# Patient Record
Sex: FEMALE | Race: WHITE | NOT HISPANIC OR LATINO | Employment: OTHER | URBAN - METROPOLITAN AREA
[De-identification: names, ages, dates, MRNs, and addresses within clinical notes are randomized per-mention and may not be internally consistent; named-entity substitution may affect disease eponyms.]

---

## 2017-06-13 LAB
HEP C HIM: NORMAL
HIV 1&2 EXT: NORMAL

## 2017-07-12 LAB — EJECTION FRACTION: 60 %

## 2019-01-10 ENCOUNTER — TRANSFERRED RECORDS (OUTPATIENT)
Dept: HEALTH INFORMATION MANAGEMENT | Facility: CLINIC | Age: 59
End: 2019-01-10

## 2019-01-10 LAB
ALBUMIN (URINE) MG/SPEC: 12.8 MG/L
ALBUMIN/CREATININE RATIO: 12.3
CHOLEST SERPL-MCNC: 183 MG/DL (ref 0–199)
CREATININE (URINE): 104 MG/DL
HDLC SERPL-MCNC: 30 MG/DL
LDLC SERPL CALC-MCNC: 110 MG/DL (ref 19–130)
LDLC SERPL CALC-MCNC: 110 MG/DL (ref 19–130)
NONHDLC SERPL-MCNC: 153 MG/DL (ref 0–159)
TRIGL SERPL-MCNC: 217 MG/DL (ref 4–149)

## 2019-02-20 LAB — GLUCOSE SERPL-MCNC: 110 MG/DL (ref 70–100)

## 2019-03-15 LAB — RETINOPATHY: NORMAL

## 2019-06-25 ENCOUNTER — TRANSFERRED RECORDS (OUTPATIENT)
Dept: MULTI SPECIALTY CLINIC | Facility: CLINIC | Age: 59
End: 2019-06-25

## 2019-06-25 ENCOUNTER — TRANSFERRED RECORDS (OUTPATIENT)
Dept: HEALTH INFORMATION MANAGEMENT | Facility: CLINIC | Age: 59
End: 2019-06-25

## 2019-06-25 LAB
CREAT SERPL-MCNC: 0.38 MG/DL (ref 0.55–1.02)
GFR SERPL CREATININE-BSD FRML MDRD: >60 ML/MIN/1.73M2
HBA1C MFR BLD: 6.8 % (ref 0–5.7)
HBA1C MFR BLD: 6.8 % (ref 0–?)
POTASSIUM SERPL-SCNC: 4.1 MMOL/L (ref 3.5–5.1)

## 2019-11-15 ENCOUNTER — OFFICE VISIT (OUTPATIENT)
Dept: FAMILY MEDICINE | Facility: CLINIC | Age: 59
End: 2019-11-15
Payer: MEDICARE

## 2019-11-15 VITALS
OXYGEN SATURATION: 91 % | DIASTOLIC BLOOD PRESSURE: 88 MMHG | BODY MASS INDEX: 39.3 KG/M2 | HEIGHT: 64 IN | RESPIRATION RATE: 24 BRPM | HEART RATE: 79 BPM | WEIGHT: 230.2 LBS | SYSTOLIC BLOOD PRESSURE: 160 MMHG | TEMPERATURE: 98.5 F

## 2019-11-15 DIAGNOSIS — F32.0 MILD MAJOR DEPRESSION (H): ICD-10-CM

## 2019-11-15 DIAGNOSIS — I10 BENIGN ESSENTIAL HYPERTENSION WITH TARGET BLOOD PRESSURE BELOW 140/90: ICD-10-CM

## 2019-11-15 DIAGNOSIS — E78.5 HYPERLIPIDEMIA LDL GOAL <100: ICD-10-CM

## 2019-11-15 DIAGNOSIS — F41.1 GAD (GENERALIZED ANXIETY DISORDER): ICD-10-CM

## 2019-11-15 DIAGNOSIS — E11.9 TYPE 2 DIABETES MELLITUS WITHOUT COMPLICATION, WITHOUT LONG-TERM CURRENT USE OF INSULIN (H): Primary | ICD-10-CM

## 2019-11-15 PROCEDURE — 99207 C FOOT EXAM  NO CHARGE: CPT | Mod: 25 | Performed by: PHYSICIAN ASSISTANT

## 2019-11-15 PROCEDURE — 99203 OFFICE O/P NEW LOW 30 MIN: CPT | Performed by: PHYSICIAN ASSISTANT

## 2019-11-15 RX ORDER — METFORMIN HCL 500 MG
2000 TABLET, EXTENDED RELEASE 24 HR ORAL DAILY
Qty: 360 TABLET | Refills: 1 | Status: SHIPPED | OUTPATIENT
Start: 2019-11-15 | End: 2020-05-11

## 2019-11-15 RX ORDER — TRAZODONE HYDROCHLORIDE 100 MG/1
300 TABLET ORAL
COMMUNITY
Start: 2019-06-11 | End: 2022-09-26

## 2019-11-15 RX ORDER — GLUCOSAMINE HCL/CHONDROITIN SU 500-400 MG
CAPSULE ORAL
Qty: 100 EACH | Refills: 3 | Status: SHIPPED | OUTPATIENT
Start: 2019-11-15 | End: 2024-01-23

## 2019-11-15 RX ORDER — LANCETS
EACH MISCELLANEOUS
Qty: 200 EACH | Refills: 5 | Status: SHIPPED | OUTPATIENT
Start: 2019-11-15 | End: 2022-10-19

## 2019-11-15 RX ORDER — HYDROCHLOROTHIAZIDE 25 MG/1
25 TABLET ORAL
COMMUNITY
Start: 2019-02-21 | End: 2019-11-15

## 2019-11-15 RX ORDER — SIMVASTATIN 20 MG
20 TABLET ORAL AT BEDTIME
Qty: 30 TABLET | Refills: 0 | Status: CANCELLED | OUTPATIENT
Start: 2019-11-15

## 2019-11-15 RX ORDER — METOPROLOL SUCCINATE 100 MG/1
100 TABLET, EXTENDED RELEASE ORAL DAILY
Qty: 90 TABLET | Refills: 1 | Status: SHIPPED | OUTPATIENT
Start: 2019-11-15 | End: 2020-05-11

## 2019-11-15 RX ORDER — HYDROCHLOROTHIAZIDE 25 MG/1
25 TABLET ORAL DAILY
Qty: 90 TABLET | Refills: 1 | Status: SHIPPED | OUTPATIENT
Start: 2019-11-15 | End: 2020-05-11

## 2019-11-15 RX ORDER — LISINOPRIL 40 MG/1
40 TABLET ORAL DAILY
Qty: 90 TABLET | Refills: 1 | Status: SHIPPED | OUTPATIENT
Start: 2019-11-15 | End: 2020-05-11

## 2019-11-15 RX ORDER — LIRAGLUTIDE 6 MG/ML
1.2 INJECTION SUBCUTANEOUS DAILY
Qty: 18 ML | Refills: 1 | Status: SHIPPED | OUTPATIENT
Start: 2019-11-15 | End: 2020-02-04

## 2019-11-15 RX ORDER — AMLODIPINE BESYLATE 10 MG/1
10 TABLET ORAL
COMMUNITY
Start: 2019-01-10 | End: 2019-11-15

## 2019-11-15 RX ORDER — LIRAGLUTIDE 6 MG/ML
1.2 INJECTION SUBCUTANEOUS
COMMUNITY
Start: 2019-10-04 | End: 2019-11-15

## 2019-11-15 RX ORDER — PRAZOSIN HYDROCHLORIDE 5 MG/1
10 CAPSULE ORAL
COMMUNITY
Start: 2019-06-11 | End: 2021-06-14

## 2019-11-15 RX ORDER — LISINOPRIL 40 MG/1
40 TABLET ORAL DAILY
Qty: 90 TABLET | Refills: 2 | Status: CANCELLED | OUTPATIENT
Start: 2019-11-15

## 2019-11-15 RX ORDER — AMLODIPINE BESYLATE 10 MG/1
10 TABLET ORAL DAILY
Qty: 90 TABLET | Refills: 1 | Status: SHIPPED | OUTPATIENT
Start: 2019-11-15 | End: 2020-05-11

## 2019-11-15 RX ORDER — SIMVASTATIN 20 MG
20 TABLET ORAL AT BEDTIME
Qty: 90 TABLET | Refills: 1 | Status: SHIPPED | OUTPATIENT
Start: 2019-11-15 | End: 2020-05-11

## 2019-11-15 RX ORDER — ZOLPIDEM TARTRATE 10 MG/1
5-10 TABLET ORAL
COMMUNITY
Start: 2019-06-11 | End: 2022-09-26

## 2019-11-15 RX ORDER — DULOXETIN HYDROCHLORIDE 60 MG/1
60 CAPSULE, DELAYED RELEASE ORAL
COMMUNITY
Start: 2019-06-11 | End: 2021-06-14

## 2019-11-15 RX ORDER — METOPROLOL SUCCINATE 50 MG/1
50 TABLET, EXTENDED RELEASE ORAL DAILY
Qty: 90 TABLET | Refills: 1 | Status: CANCELLED | OUTPATIENT
Start: 2019-11-15

## 2019-11-15 RX ORDER — ACETAMINOPHEN 500 MG
1000 TABLET ORAL
COMMUNITY
Start: 2017-02-13 | End: 2022-10-19

## 2019-11-15 ASSESSMENT — MIFFLIN-ST. JEOR: SCORE: 1624.43

## 2019-11-15 NOTE — PROGRESS NOTES
Subjective     Jacob Cai is a 58 year old female who presents to clinic today for the following health issues:    HPI   New Patient/Transfer of Care    Diabetes Follow-up    How often are you checking your blood sugar? A few times a week  What time of day are you checking your blood sugars (select all that apply)?  Before and after meals  Have you had any blood sugars above 200?  No  Have you had any blood sugars below 70?  No    What symptoms do you notice when your blood sugar is low?  Shaky and nausea     What concerns do you have today about your diabetes? None     Do you have any of these symptoms? (Select all that apply)  Numbness in feet, Burning in feet, Blurry vision and Weight gain     Have you had a diabetic eye exam in the last 12 months? No    Diabetes Management Resources    Hyperlipidemia Follow-Up      Are you having any of the following symptoms? (Select all that apply)  Left-sided neck or arm pain    Are you regularly taking any medication or supplement to lower your cholesterol?   Yes- Simvastatin    Are you having muscle aches or other side effects that you think could be caused by your cholesterol lowering medication?  No    Hypertension Follow-up      Do you check your blood pressure regularly outside of the clinic? Yes     Are you following a low salt diet? Yes    Are your blood pressures ever more than 140 on the top number (systolic) OR more   than 90 on the bottom number (diastolic), for example 140/90? Yes    BP Readings from Last 2 Encounters:   11/15/19 (!) 160/88   07/21/14 128/84     Hemoglobin A1C (%)   Date Value   07/21/2014 7.5 (H)   01/24/2014 6.4 (H)     LDL Cholesterol Calculated (mg/dL)   Date Value   01/24/2014 85   08/07/2013 84       Depression and Anxiety Follow-Up    Sees a psychiatrist, Dr. Arce      Social History     Tobacco Use     Smoking status: Never Smoker     Smokeless tobacco: Never Used   Substance Use Topics     Alcohol use: No     Drug use: No     No  "flowsheet data found.  JOSH-7 SCORE 6/7/2013 1/23/2014   Total Score 2 4       Suicide Assessment Five-step Evaluation and Treatment (SAFE-T)      How many servings of fruits and vegetables do you eat daily?  2-3    On average, how many sweetened beverages do you drink each day (soda, juice, sweet tea, etc)?   2    How many days per week do you miss taking your medication? 0        Patient Active Problem List   Diagnosis     GERD (gastroesophageal reflux disease)     Mild major depression (H)     Degenerative joint disease of knee     Obesity     Hyperlipidemia LDL goal <100     Type 2 diabetes mellitus without complication, without long-term current use of insulin (H)     JOSH (generalized anxiety disorder)     Benign essential hypertension with target blood pressure below 140/90     History of thyroidectomy, total     Past Surgical History:   Procedure Laterality Date     ABDOMEN SURGERY  2007    Tumor in the stomach     HYSTERECTOMY, PAP NO LONGER INDICATED       HYSTERECTOMY, KEVIN  8/2006       Social History     Tobacco Use     Smoking status: Never Smoker     Smokeless tobacco: Never Used   Substance Use Topics     Alcohol use: No     No family history on file.        Reviewed and updated as needed this visit by Provider         Review of Systems   ROS COMP: Constitutional, cardiovascular, neuro, skin, endocrine and psych systems are negative, except as otherwise noted.      Objective    BP (!) 160/88   Pulse 79   Temp 98.5  F (36.9  C) (Oral)   Resp 24   Ht 1.634 m (5' 4.33\")   Wt 104.4 kg (230 lb 3.2 oz)   SpO2 91%   BMI 39.11 kg/m    Body mass index is 39.11 kg/m .  Physical Exam   GENERAL: healthy, alert and no distress  MS: no gross musculoskeletal defects noted, no edema  NEURO: Normal strength and tone, mentation intact and speech normal  PSYCH: mentation appears normal, affect normal/bright  Diabetic foot exam: normal DP and PT pulses, normal sensory exam, normal monofilament exam, except for " "findings noted on diabetic foot graphical image.      Missed #9 bilat         and trophic changes at heels bilat          Assessment & Plan   Assessment  1. Type 2 diabetes mellitus without complication, without long-term current use of insulin (H)    2. Benign essential hypertension with target blood pressure below 140/90    3. Hyperlipidemia LDL goal <100    4. Mild major depression (H)    5. JOSH (generalized anxiety disorder)         Plan  1-3) Meds renewed, no changes. Recommended she soak her feet and use a Peumus stone on her heels. Will repeat her labs at her upcoming physical.     4,5) Follows psychiatry.      BMI:   Estimated body mass index is 39.11 kg/m  as calculated from the following:    Height as of this encounter: 1.634 m (5' 4.33\").    Weight as of this encounter: 104.4 kg (230 lb 3.2 oz).       Return in about 1 month (around 12/15/2019) for your annual physical and fasting labs.    Clara Ambriz PA-C  [facility]          "

## 2020-01-28 ENCOUNTER — OFFICE VISIT (OUTPATIENT)
Dept: FAMILY MEDICINE | Facility: CLINIC | Age: 60
End: 2020-01-28
Payer: MEDICARE

## 2020-01-28 VITALS
TEMPERATURE: 97.4 F | WEIGHT: 234.2 LBS | BODY MASS INDEX: 39.79 KG/M2 | RESPIRATION RATE: 28 BRPM | DIASTOLIC BLOOD PRESSURE: 74 MMHG | SYSTOLIC BLOOD PRESSURE: 134 MMHG | OXYGEN SATURATION: 95 % | HEART RATE: 90 BPM

## 2020-01-28 DIAGNOSIS — E11.9 TYPE 2 DIABETES MELLITUS WITHOUT COMPLICATION, WITHOUT LONG-TERM CURRENT USE OF INSULIN (H): ICD-10-CM

## 2020-01-28 DIAGNOSIS — E78.5 HYPERLIPIDEMIA LDL GOAL <100: ICD-10-CM

## 2020-01-28 DIAGNOSIS — Z12.31 ENCOUNTER FOR SCREENING MAMMOGRAM FOR BREAST CANCER: ICD-10-CM

## 2020-01-28 DIAGNOSIS — Z12.11 COLON CANCER SCREENING: ICD-10-CM

## 2020-01-28 DIAGNOSIS — E89.0 HISTORY OF THYROIDECTOMY, TOTAL: ICD-10-CM

## 2020-01-28 DIAGNOSIS — Z00.01 ENCOUNTER FOR ROUTINE ADULT MEDICAL EXAM WITH ABNORMAL FINDINGS: Primary | ICD-10-CM

## 2020-01-28 LAB
ANION GAP SERPL CALCULATED.3IONS-SCNC: 9 MMOL/L (ref 3–14)
BUN SERPL-MCNC: 10 MG/DL (ref 7–30)
CALCIUM SERPL-MCNC: 9.1 MG/DL (ref 8.5–10.1)
CHLORIDE SERPL-SCNC: 102 MMOL/L (ref 94–109)
CHOLEST SERPL-MCNC: 139 MG/DL
CO2 SERPL-SCNC: 27 MMOL/L (ref 20–32)
CREAT SERPL-MCNC: 0.36 MG/DL (ref 0.52–1.04)
GFR SERPL CREATININE-BSD FRML MDRD: >90 ML/MIN/{1.73_M2}
GLUCOSE SERPL-MCNC: 216 MG/DL (ref 70–99)
HBA1C MFR BLD: 8.1 % (ref 0–5.6)
HDLC SERPL-MCNC: 31 MG/DL
LDLC SERPL CALC-MCNC: 83 MG/DL
NONHDLC SERPL-MCNC: 108 MG/DL
POTASSIUM SERPL-SCNC: 3.6 MMOL/L (ref 3.4–5.3)
SODIUM SERPL-SCNC: 138 MMOL/L (ref 133–144)
TRIGL SERPL-MCNC: 127 MG/DL
TSH SERPL DL<=0.005 MIU/L-ACNC: 0.82 MU/L (ref 0.4–4)

## 2020-01-28 PROCEDURE — 82043 UR ALBUMIN QUANTITATIVE: CPT | Performed by: PHYSICIAN ASSISTANT

## 2020-01-28 PROCEDURE — 36415 COLL VENOUS BLD VENIPUNCTURE: CPT | Performed by: PHYSICIAN ASSISTANT

## 2020-01-28 PROCEDURE — 99213 OFFICE O/P EST LOW 20 MIN: CPT | Mod: 25 | Performed by: PHYSICIAN ASSISTANT

## 2020-01-28 PROCEDURE — 80061 LIPID PANEL: CPT | Performed by: PHYSICIAN ASSISTANT

## 2020-01-28 PROCEDURE — 84443 ASSAY THYROID STIM HORMONE: CPT | Performed by: PHYSICIAN ASSISTANT

## 2020-01-28 PROCEDURE — 80048 BASIC METABOLIC PNL TOTAL CA: CPT | Performed by: PHYSICIAN ASSISTANT

## 2020-01-28 PROCEDURE — 83036 HEMOGLOBIN GLYCOSYLATED A1C: CPT | Performed by: PHYSICIAN ASSISTANT

## 2020-01-28 PROCEDURE — 99396 PREV VISIT EST AGE 40-64: CPT | Performed by: PHYSICIAN ASSISTANT

## 2020-01-28 ASSESSMENT — ANXIETY QUESTIONNAIRES
1. FEELING NERVOUS, ANXIOUS, OR ON EDGE: NOT AT ALL
6. BECOMING EASILY ANNOYED OR IRRITABLE: NOT AT ALL
GAD7 TOTAL SCORE: 0
5. BEING SO RESTLESS THAT IT IS HARD TO SIT STILL: NOT AT ALL
3. WORRYING TOO MUCH ABOUT DIFFERENT THINGS: NOT AT ALL
IF YOU CHECKED OFF ANY PROBLEMS ON THIS QUESTIONNAIRE, HOW DIFFICULT HAVE THESE PROBLEMS MADE IT FOR YOU TO DO YOUR WORK, TAKE CARE OF THINGS AT HOME, OR GET ALONG WITH OTHER PEOPLE: NOT DIFFICULT AT ALL
7. FEELING AFRAID AS IF SOMETHING AWFUL MIGHT HAPPEN: NOT AT ALL
2. NOT BEING ABLE TO STOP OR CONTROL WORRYING: NOT AT ALL

## 2020-01-28 ASSESSMENT — PATIENT HEALTH QUESTIONNAIRE - PHQ9
5. POOR APPETITE OR OVEREATING: NOT AT ALL
SUM OF ALL RESPONSES TO PHQ QUESTIONS 1-9: 3

## 2020-01-28 NOTE — PATIENT INSTRUCTIONS
"  Calcium 600mg twice daily  Vitamin D 2000 units per day    Check to see if your insurance covers an eye exam for \"screening for diabetic retinopathy.\"        Preventive Health Recommendations  Female Ages 50 - 64    Yearly exam: See your health care provider every year in order to  o Review health changes.   o Discuss preventive care.    o Review your medicines if your doctor has prescribed any.      Get a Pap test every three years (unless you have an abnormal result and your provider advises testing more often).    If you get Pap tests with HPV test, you only need to test every 5 years, unless you have an abnormal result.     You do not need a Pap test if your uterus was removed (hysterectomy) and you have not had cancer.    You should be tested each year for STDs (sexually transmitted diseases) if you're at risk.     Have a mammogram every 1 to 2 years.    Have a colonoscopy at age 50, or have a yearly FIT test (stool test). These exams screen for colon cancer.      Have a cholesterol test every 5 years, or more often if advised.    Have a diabetes test (fasting glucose) every three years. If you are at risk for diabetes, you should have this test more often.     If you are at risk for osteoporosis (brittle bone disease), think about having a bone density scan (DEXA).    Shots: Get a flu shot each year. Get a tetanus shot every 10 years.    Nutrition:     Eat at least 5 servings of fruits and vegetables each day.    Eat whole-grain bread, whole-wheat pasta and brown rice instead of white grains and rice.    Get adequate Calcium and Vitamin D.     Lifestyle    Exercise at least 150 minutes a week (30 minutes a day, 5 days a week). This will help you control your weight and prevent disease.    Limit alcohol to one drink per day.    No smoking.     Wear sunscreen to prevent skin cancer.     See your dentist every six months for an exam and cleaning.    See your eye doctor every 1 to 2 years.    "

## 2020-01-28 NOTE — PROGRESS NOTES
SUBJECTIVE:   CC: Jacob Cai is an 59 year old woman who presents for preventive health visit.     Healthy Habits:    Do you get at least three servings of calcium containing foods daily (dairy, green leafy vegetables, etc.)? No    Amount of exercise or daily activities, outside of work: No    Problems taking medications regularly No    Medication side effects: No    Have you had an eye exam in the past two years? no    Do you see a dentist twice per year? no    Do you have sleep apnea, excessive snoring or daytime drowsiness?Yes daytime drowsiness      PROBLEMS TO ADD ON...  Patient informed that anything we discuss that is not related to preventative medicine, may be billed for; patient verbalizes understanding.    None    The ASCVD Risk score (Laithsabrina JURADO Jr., et al., 2013) failed to calculate for the following reasons:    Cannot find a previous HDL lab    Cannot find a previous total cholesterol lab   -------------------------------------    Today's PHQ-2 Score:   PHQ-2 ( 1999 [employer]) 1/28/2020 7/21/2014   Q1: Little interest or pleasure in doing things 0 0   Q2: Feeling down, depressed or hopeless 0 0   PHQ-2 Score 0 0       Abuse: Current or Past(Physical, Sexual or Emotional)- No  Do you feel safe in your environment? Yes    Have you ever done Advance Care Planning? (For example, a Health Directive, POLST, or a discussion with a medical provider or your loved ones about your wishes): No, advance care planning information given to patient to review.  Patient plans to discuss their wishes with loved ones or provider.      Social History     Tobacco Use     Smoking status: Never Smoker     Smokeless tobacco: Never Used   Substance Use Topics     Alcohol use: No     If you drink alcohol do you typically have >3 drinks per day or >7 drinks per week? No                     Reviewed orders with patient.  Reviewed health maintenance and updated orders accordingly - Yes  Lab work is in process    Mammogram  Screening: Patient over age 50, mutual decision to screen reflected in health maintenance.    Pertinent mammograms are reviewed under the imaging tab.  History of abnormal Pap smear: Status post benign hysterectomy. Health Maintenance and Surgical History updated.     Reviewed and updated as needed this visit by clinical staff  Tobacco  Allergies  Meds         Reviewed and updated as needed this visit by Provider        Past Medical History:   Diagnosis Date     Hypertension         ROS:  Other than what is noted in the HPI and PMH a complete review of systems is otherwise negative including: Constitutional, HEENT, endocrine, cardiovascular, respiratory, GI/, musculoskeletal, neuro, and psychiatric.     OBJECTIVE:   /74   Pulse 90   Temp 97.4  F (36.3  C) (Tympanic)   Resp 28   Wt 106.2 kg (234 lb 3.2 oz)   SpO2 95%   BMI 39.79 kg/m    EXAM:  GENERAL: healthy, alert and no distress  EYES: Eyes grossly normal to inspection, PERRL and conjunctivae and sclerae normal  HENT: ear canals and TM's normal, nose and mouth without ulcers or lesions  NECK: no adenopathy, no asymmetry, masses, or scars and thyroid normal to palpation  RESP: lungs clear to auscultation - no rales, rhonchi or wheezes  BREAST: normal without masses, tenderness or nipple discharge and no palpable axillary masses or adenopathy  CV: regular rates and rhythm, normal S1 S2, no S3 or S4, no murmur, click or rub and no bruits heard   ABDOMEN: soft, nontender, no hepatosplenomegaly, no masses and bowel sounds normal  MS: no gross musculoskeletal defects noted, no edema  SKIN: no suspicious lesions or rashes  NEURO: Normal strength and tone, mentation intact and speech normal  PSYCH: mentation appears normal, affect normal/bright    ASSESSMENT/PLAN:       ICD-10-CM    1. Encounter for routine adult medical exam with abnormal findings Z00.01    2. Colon cancer screening Z12.11 Fecal colorectal cancer screen (FIT)   3. Encounter for  "screening mammogram for breast cancer Z12.31 MA SCREENING DIGITAL BILAT - Future  (s+30)   4. Type 2 diabetes mellitus without complication, without long-term current use of insulin (H) E11.9 HEMOGLOBIN A1C     BASIC METABOLIC PANEL     Albumin Random Urine Quantitative with Creat Ratio     OPTOMETRY REFERRAL   5. Hyperlipidemia LDL goal <100 E78.5 Lipid panel reflex to direct LDL Fasting   6. History of thyroidectomy, total E89.0 TSH WITH FREE T4 REFLEX       1-3) Screenings discussed.    4-6) Routine labs today. If A1c <7% will follow up with her again in 6 months.       COUNSELING:   Reviewed preventive health counseling, as reflected in patient instructions    Estimated body mass index is 39.79 kg/m  as calculated from the following:    Height as of 11/15/19: 1.634 m (5' 4.33\").    Weight as of this encounter: 106.2 kg (234 lb 3.2 oz).     reports that she has never smoked. She has never used smokeless tobacco.    Counseling Resources:  ATP IV Guidelines  Pooled Cohorts Equation Calculator  Breast Cancer Risk Calculator  FRAX Risk Assessment  ICSI Preventive Guidelines  Dietary Guidelines for Americans, 2010  USDA's MyPlate  ASA Prophylaxis  Lung CA Screening    Clara Ambriz PA-C  [facility]  "

## 2020-01-29 LAB
CREAT UR-MCNC: 34 MG/DL
MICROALBUMIN UR-MCNC: 8 MG/L
MICROALBUMIN/CREAT UR: 24.44 MG/G CR (ref 0–25)

## 2020-01-29 ASSESSMENT — ANXIETY QUESTIONNAIRES: GAD7 TOTAL SCORE: 0

## 2020-01-30 DIAGNOSIS — Z12.11 COLON CANCER SCREENING: ICD-10-CM

## 2020-01-30 PROCEDURE — 82274 ASSAY TEST FOR BLOOD FECAL: CPT | Performed by: PHYSICIAN ASSISTANT

## 2020-01-30 NOTE — LETTER
February 5, 2020      Jacob Cai  [address]  [place] [address]      Jacob,    Tvoj skrining na ethan jonathan jensen negativan.    Molim vas da me nazovete bilo delontekvim kathe shabbir nedoumicama.      Clara Ambriz PA-C/wilberto    Resulted Orders   Fecal colorectal cancer screen (FIT)   Result Value Ref Range    Occult Blood Scn FIT Negative NEG^Negative

## 2020-01-30 NOTE — LETTER
February 5, 2020      Jacob Cai  [address]  [facility] [address]        Dear ,    We are writing to inform you of your test results.    Your colon cancer screening is negative.     Resulted Orders   Fecal colorectal cancer screen (FIT)   Result Value Ref Range    Occult Blood Scn FIT Negative NEG^Negative       If you have any questions or concerns, please call the clinic at the number listed above.       Sincerely,        Clara Ambriz PA-C/july

## 2020-02-02 LAB — HEMOCCULT STL QL IA: NEGATIVE

## 2020-02-03 NOTE — RESULT ENCOUNTER NOTE
Please send the following letter to the [facility]:    Jacob,    Your colon cancer screening is negative.     Please call me with any questions or concerns.          Clara Ambriz PA-C

## 2020-02-04 ENCOUNTER — TELEPHONE (OUTPATIENT)
Dept: FAMILY MEDICINE | Facility: CLINIC | Age: 60
End: 2020-02-04

## 2020-02-04 DIAGNOSIS — E11.9 TYPE 2 DIABETES MELLITUS WITHOUT COMPLICATION, WITHOUT LONG-TERM CURRENT USE OF INSULIN (H): ICD-10-CM

## 2020-02-04 RX ORDER — LIRAGLUTIDE 6 MG/ML
1.8 INJECTION SUBCUTANEOUS DAILY
Qty: 9 ML | Refills: 0 | Status: SHIPPED | OUTPATIENT
Start: 2020-02-04 | End: 2020-03-06

## 2020-02-04 NOTE — TELEPHONE ENCOUNTER
Phoned patient via [facility] .   Left message on voice mail for patient to call clinic. [phone]

## 2020-02-04 NOTE — TELEPHONE ENCOUNTER
Please call patient with the following info, [facility]    Diabetes is uncontrolled with an A1c of 8.1%. She needs to cut back on carbs/sugar in her diet. I would also like her to increase her Victoza dose to 1.8mg daily. She should follow up with me in 2 months for a repeat A1c.    Cholesterol and thyroid levels are good.   Kidney function is well preserved.  Her colon cancer screening is negative.

## 2020-02-05 NOTE — TELEPHONE ENCOUNTER
With assistance of [facility] , patient notified of results and instructions. Follow up appt scheduled.  Kandi Hurtado RN

## 2020-02-05 NOTE — TELEPHONE ENCOUNTER
Left message via [employer]  on voice mail for patient to call clinic. [phone].  Kandi Hurtado RN

## 2020-02-05 NOTE — TELEPHONE ENCOUNTER
Please call patient @ [phone] with an , she will be expecting this call today. Call was returned by daughter however there is no consent to communicate on file for her. Number's updated in the system. (there is also a result note)

## 2020-04-02 ENCOUNTER — VIRTUAL VISIT (OUTPATIENT)
Dept: FAMILY MEDICINE | Facility: CLINIC | Age: 60
End: 2020-04-02
Payer: MEDICARE

## 2020-04-02 DIAGNOSIS — E11.9 TYPE 2 DIABETES MELLITUS WITHOUT COMPLICATION, WITHOUT LONG-TERM CURRENT USE OF INSULIN (H): Primary | ICD-10-CM

## 2020-04-02 PROCEDURE — 99441 ZZC PHYSICIAN TELEPHONE EVALUATION 5-10 MIN: CPT | Performed by: PHYSICIAN ASSISTANT

## 2020-04-02 ASSESSMENT — ANXIETY QUESTIONNAIRES
1. FEELING NERVOUS, ANXIOUS, OR ON EDGE: NOT AT ALL
6. BECOMING EASILY ANNOYED OR IRRITABLE: SEVERAL DAYS
5. BEING SO RESTLESS THAT IT IS HARD TO SIT STILL: SEVERAL DAYS
2. NOT BEING ABLE TO STOP OR CONTROL WORRYING: NOT AT ALL
7. FEELING AFRAID AS IF SOMETHING AWFUL MIGHT HAPPEN: SEVERAL DAYS
IF YOU CHECKED OFF ANY PROBLEMS ON THIS QUESTIONNAIRE, HOW DIFFICULT HAVE THESE PROBLEMS MADE IT FOR YOU TO DO YOUR WORK, TAKE CARE OF THINGS AT HOME, OR GET ALONG WITH OTHER PEOPLE: NOT DIFFICULT AT ALL
3. WORRYING TOO MUCH ABOUT DIFFERENT THINGS: SEVERAL DAYS
GAD7 TOTAL SCORE: 4

## 2020-04-02 ASSESSMENT — PATIENT HEALTH QUESTIONNAIRE - PHQ9
5. POOR APPETITE OR OVEREATING: NOT AT ALL
SUM OF ALL RESPONSES TO PHQ QUESTIONS 1-9: 1

## 2020-04-02 NOTE — PROGRESS NOTES
"Subjective     Jacob Cai is a 59 year old female who is being evaluated via a billable telephone visit.      The patient has been notified of following:     \"This telephone visit will be conducted via a call between you and your physician/provider. We have found that certain health care needs can be provided without the need for a physical exam.  This service lets us provide the care you need with a short phone conversation.  If a prescription is necessary we can send it directly to your pharmacy.  If lab work is needed we can place an order for that and you can then stop by our lab to have the test done at a later time.    If during the course of the call the physician/provider feels a telephone visit is not appropriate, you will not be charged for this service.\"     Patient has given verbal consent for Telephone visit?  Yes    Jacob Cai complains of   Chief Complaint   Patient presents with     Diabetes     Hyperlipidemia     Hypertension     Depression       ALLERGIES  Nkda [no known drug allergies]    Diabetes Follow-up    How often are you checking your blood sugar? A few times a week  What time of day are you checking your blood sugars (select all that apply)?  Before and after meals  Have you had any blood sugars above 200?  Yes sometime  Have you had any blood sugars below 70?  No    What symptoms do you notice when your blood sugar is low?  None    What concerns do you have today about your diabetes? None     Do you have any of these symptoms? (Select all that apply)  No numbness or tingling in feet.  No redness, sores or blisters on feet.  No complaints of excessive thirst.  No reports of blurry vision.  No significant changes to weight.    Have you had a diabetic eye exam in the last 12 months? No    Has made some dietary changes  Checking blood sugars fasting and after lunch   Fastin-170  Sugars have improved since increasing Victoza     Tele note regarding labs:  Diabetes is " uncontrolled with an A1c of 8.1%. She needs to cut back on carbs/sugar in her diet. I would also like her to increase her Victoza dose to 1.8mg daily. She should follow up with me in 2 months for a repeat A1c.  Cholesterol and thyroid levels are good.   Kidney function is well preserved.  Her colon cancer screening is negative.        BP Readings from Last 2 Encounters:   01/28/20 134/74   11/15/19 (!) 160/88     Hemoglobin A1C (%)   Date Value   01/28/2020 8.1 (H)   06/25/2019 6.8 (H)   06/25/2019 6.8 (A)     LDL Cholesterol Calculated (mg/dL)   Date Value   01/28/2020 83   01/10/2019 110   01/10/2019 110         How many servings of fruits and vegetables do you eat daily?  2-3    On average, how many sweetened beverages do you drink each day (Examples: soda, juice, sweet tea, etc.  Do NOT count diet or artificially sweetened beverages)?   0    How many days per week do you exercise enough to make your heart beat faster? 3 or less    How many minutes a day do you exercise enough to make your heart beat faster? 20 - 29    How many days per week do you miss taking your medication? 0           Patient Active Problem List   Diagnosis     GERD (gastroesophageal reflux disease)     Mild major depression (H)     Degenerative joint disease of knee     Obesity     Hyperlipidemia LDL goal <100     Type 2 diabetes mellitus without complication, without long-term current use of insulin (H)     JOSH (generalized anxiety disorder)     Benign essential hypertension with target blood pressure below 140/90     History of thyroidectomy, total     Past Surgical History:   Procedure Laterality Date     ABDOMEN SURGERY  2007    Tumor in the stomach     HYSTERECTOMY, PAP NO LONGER INDICATED       HYSTERECTOMY, KEVIN  8/2006       Social History     Tobacco Use     Smoking status: Never Smoker     Smokeless tobacco: Never Used   Substance Use Topics     Alcohol use: No     No family history on file.        Reviewed and updated as needed  this visit by Provider         Review of Systems   ROS COMP: Constitutional, endocrine systems are negative, except as otherwise noted.       Objective   Reported vitals:  There were no vitals taken for this visit.   alert and no distress  Psych: Alert and oriented times 3; coherent speech, normal   rate and volume, able to articulate logical thoughts, able   to abstract reason, no tangential thoughts, no hallucinations   or delusions  Her affect is normal/bright           Assessment/Plan:  1. Type 2 diabetes mellitus without complication, without long-term current use of insulin (H)         Patient will continue her current med regimen. She reports improved glucose readings. Plan to see her back in clinic post-Covid for a repeat A1c.     Return in about 3 months (around 7/2/2020) for diabetes follow up, repeat labs.      Phone call duration:  6 minutes    Clara Ambriz PA-C

## 2020-04-03 ASSESSMENT — ANXIETY QUESTIONNAIRES: GAD7 TOTAL SCORE: 4

## 2020-05-07 DIAGNOSIS — I10 BENIGN ESSENTIAL HYPERTENSION WITH TARGET BLOOD PRESSURE BELOW 140/90: ICD-10-CM

## 2020-05-07 DIAGNOSIS — E11.9 TYPE 2 DIABETES MELLITUS WITHOUT COMPLICATION, WITHOUT LONG-TERM CURRENT USE OF INSULIN (H): ICD-10-CM

## 2020-05-07 DIAGNOSIS — E78.5 HYPERLIPIDEMIA LDL GOAL <100: ICD-10-CM

## 2020-05-08 NOTE — TELEPHONE ENCOUNTER
Routing refill request to provider for review/approval because:  Labs out of range:  Creatinine and due for A1C  Drug warning with simvastatin    Hemoglobin A1C   Date Value Ref Range Status   01/28/2020 8.1 (H) 0 - 5.6 % Final     Comment:     Normal <5.7% Prediabetes 5.7-6.4%  Diabetes 6.5% or higher - adopted from ADA   consensus guidelines.  Results confirmed by repeat test         Last Comprehensive Metabolic Panel:  Sodium   Date Value Ref Range Status   01/28/2020 138 133 - 144 mmol/L Final     Potassium   Date Value Ref Range Status   01/28/2020 3.6 3.4 - 5.3 mmol/L Final     Chloride   Date Value Ref Range Status   01/28/2020 102 94 - 109 mmol/L Final     Carbon Dioxide   Date Value Ref Range Status   01/28/2020 27 20 - 32 mmol/L Final     Anion Gap   Date Value Ref Range Status   01/28/2020 9 3 - 14 mmol/L Final     Glucose   Date Value Ref Range Status   01/28/2020 216 (H) 70 - 99 mg/dL Final     Comment:     Fasting specimen     Urea Nitrogen   Date Value Ref Range Status   01/28/2020 10 7 - 30 mg/dL Final     Creatinine   Date Value Ref Range Status   01/28/2020 0.36 (L) 0.52 - 1.04 mg/dL Final     GFR Estimate   Date Value Ref Range Status   01/28/2020 >90 >60 mL/min/[1.73_m2] Final     Comment:     Non  GFR Calc  Starting 12/18/2018, serum creatinine based estimated GFR (eGFR) will be   calculated using the Chronic Kidney Disease Epidemiology Collaboration   (CKD-EPI) equation.       Calcium   Date Value Ref Range Status   01/28/2020 9.1 8.5 - 10.1 mg/dL Final     Bilirubin Total   Date Value Ref Range Status   07/19/2010 0.4 0.2 - 1.3 mg/dL Final     Alkaline Phosphatase   Date Value Ref Range Status   07/19/2010 72 40 - 150 U/L Final     ALT   Date Value Ref Range Status   04/30/2012 27 0 - 50 U/L Final     AST   Date Value Ref Range Status   07/19/2010 24 0 - 45 U/L Final

## 2020-05-11 RX ORDER — AMLODIPINE BESYLATE 10 MG/1
10 TABLET ORAL DAILY
Qty: 60 TABLET | Refills: 0 | Status: SHIPPED | OUTPATIENT
Start: 2020-05-11 | End: 2020-07-01

## 2020-05-11 RX ORDER — METFORMIN HCL 500 MG
2000 TABLET, EXTENDED RELEASE 24 HR ORAL DAILY
Qty: 240 TABLET | Refills: 0 | Status: SHIPPED | OUTPATIENT
Start: 2020-05-11 | End: 2020-07-08

## 2020-05-11 RX ORDER — METOPROLOL SUCCINATE 100 MG/1
100 TABLET, EXTENDED RELEASE ORAL DAILY
Qty: 60 TABLET | Refills: 0 | Status: SHIPPED | OUTPATIENT
Start: 2020-05-11 | End: 2020-07-01

## 2020-05-11 RX ORDER — HYDROCHLOROTHIAZIDE 25 MG/1
25 TABLET ORAL DAILY
Qty: 60 TABLET | Refills: 0 | Status: SHIPPED | OUTPATIENT
Start: 2020-05-11 | End: 2020-07-01

## 2020-05-11 RX ORDER — LISINOPRIL 40 MG/1
40 TABLET ORAL DAILY
Qty: 60 TABLET | Refills: 0 | Status: SHIPPED | OUTPATIENT
Start: 2020-05-11 | End: 2020-07-08

## 2020-05-11 RX ORDER — SIMVASTATIN 20 MG
20 TABLET ORAL AT BEDTIME
Qty: 60 TABLET | Refills: 0 | Status: SHIPPED | OUTPATIENT
Start: 2020-05-11 | End: 2020-07-01

## 2020-05-11 NOTE — TELEPHONE ENCOUNTER
Clinton Merrill CMA contacted Jacob on 05/11/20 and left a message. If patient calls back please schedule Lab only  appointment

## 2020-05-11 NOTE — TELEPHONE ENCOUNTER
Meds refilled x2 months.   While [facility] is still a virtual clinic I'd like to have her come in for repeat labs. Orders placed, please help her schedule

## 2020-05-21 DIAGNOSIS — E11.9 TYPE 2 DIABETES MELLITUS WITHOUT COMPLICATION, WITHOUT LONG-TERM CURRENT USE OF INSULIN (H): ICD-10-CM

## 2020-05-21 LAB — HBA1C MFR BLD: 7.9 % (ref 0–5.6)

## 2020-05-21 PROCEDURE — 83036 HEMOGLOBIN GLYCOSYLATED A1C: CPT | Performed by: PHYSICIAN ASSISTANT

## 2020-05-21 PROCEDURE — 36415 COLL VENOUS BLD VENIPUNCTURE: CPT | Performed by: PHYSICIAN ASSISTANT

## 2020-05-21 NOTE — LETTER
May 27, 2020      Jacob Cai  [address]  [place] [facility] [address]        Dear ,    We are writing to inform you of your test results.    Your A1c is just below goal. I'd like to see your A1c close to 7%. Continue your medications without change for now. I'd like to see you back in the clinic in 3 months (end of August) for another diabetes check and repeat A1c.     Resulted Orders   **A1C FUTURE anytime   Result Value Ref Range    Hemoglobin A1C 7.9 (H) 0 - 5.6 %      Comment:      Normal <5.7% Prediabetes 5.7-6.4%  Diabetes 6.5% or higher - adopted from ADA   consensus guidelines.         If you have any questions or concerns, please call the clinic at the number listed above.       Sincerely,      Clara Ambriz PA-C/july

## 2020-05-27 ENCOUNTER — TELEPHONE (OUTPATIENT)
Dept: FAMILY MEDICINE | Facility: CLINIC | Age: 60
End: 2020-05-27

## 2020-05-27 NOTE — RESULT ENCOUNTER NOTE
Please send the following letter to the patient IN [facility]:    Jacob,    Your A1c is just below goal. I'd like to see your A1c close to 7%. Continue your medications without change for now. I'd like to see you back in the clinic in 3 months (end of August) for another diabetes check and repeat A1c.    Please call me with any questions or concerns.          Clara Ambriz PA-C

## 2020-06-04 DIAGNOSIS — E11.9 TYPE 2 DIABETES MELLITUS WITHOUT COMPLICATION, WITHOUT LONG-TERM CURRENT USE OF INSULIN (H): ICD-10-CM

## 2020-06-04 NOTE — LETTER
Tarhsa 15, 2020        Jacob Cai  [address] [facility] [address]      Dear Jacob,    Your medication has been approved for one month only.    However, you are due for a lab appointment for further refills. Please schedule this visit at your earliest convenience.    Thank you.      [facility] Care Team

## 2020-06-05 RX ORDER — LIRAGLUTIDE 6 MG/ML
INJECTION SUBCUTANEOUS
Qty: 18 ML | Refills: 1 | Status: SHIPPED | OUTPATIENT
Start: 2020-06-05 | End: 2020-07-09

## 2020-06-05 NOTE — TELEPHONE ENCOUNTER
One month supply given. Needs lab draw before further refills. Please call to schedule.    JOSE Last on 6/5/2020 at 3:49 PM

## 2020-06-12 NOTE — TELEPHONE ENCOUNTER
Clinton Merrill CMA contacted Jacob on 06/12/20 and left a message. If patient calls back please schedule lab only appointment.

## 2020-07-01 DIAGNOSIS — E11.9 TYPE 2 DIABETES MELLITUS WITHOUT COMPLICATION, WITHOUT LONG-TERM CURRENT USE OF INSULIN (H): ICD-10-CM

## 2020-07-01 DIAGNOSIS — I10 BENIGN ESSENTIAL HYPERTENSION WITH TARGET BLOOD PRESSURE BELOW 140/90: ICD-10-CM

## 2020-07-08 NOTE — TELEPHONE ENCOUNTER
Routing refill request to provider for review/approval because:  Needs provider approval, pen needles not on med list

## 2020-07-08 NOTE — TELEPHONE ENCOUNTER
Spoke with patient, appointment scheduled for diabetes recheck 7/20/20. Patient will be fasting. Patient will run out of needles before seen. Please send rx for needles to  [facility] - [facility]LL[facility] - [address] [place] [address]. [address]

## 2020-07-09 RX ORDER — METFORMIN HCL 500 MG
2000 TABLET, EXTENDED RELEASE 24 HR ORAL DAILY
Qty: 120 TABLET | Refills: 0 | Status: SHIPPED | OUTPATIENT
Start: 2020-07-09 | End: 2020-07-29

## 2020-07-09 RX ORDER — LISINOPRIL 40 MG/1
40 TABLET ORAL DAILY
Qty: 30 TABLET | Refills: 0 | Status: SHIPPED | OUTPATIENT
Start: 2020-07-09 | End: 2020-07-29

## 2020-07-09 RX ORDER — LIRAGLUTIDE 6 MG/ML
1.8 INJECTION SUBCUTANEOUS DAILY
Qty: 9 ML | Refills: 0 | Status: SHIPPED | OUTPATIENT
Start: 2020-07-09 | End: 2020-08-09

## 2020-07-20 ENCOUNTER — OFFICE VISIT (OUTPATIENT)
Dept: FAMILY MEDICINE | Facility: CLINIC | Age: 60
End: 2020-07-20
Payer: MEDICARE

## 2020-07-20 VITALS
HEART RATE: 74 BPM | HEIGHT: 64 IN | DIASTOLIC BLOOD PRESSURE: 64 MMHG | WEIGHT: 227 LBS | TEMPERATURE: 97.2 F | OXYGEN SATURATION: 96 % | RESPIRATION RATE: 20 BRPM | BODY MASS INDEX: 38.76 KG/M2 | SYSTOLIC BLOOD PRESSURE: 103 MMHG

## 2020-07-20 DIAGNOSIS — E11.9 TYPE 2 DIABETES MELLITUS WITHOUT COMPLICATION, WITHOUT LONG-TERM CURRENT USE OF INSULIN (H): Primary | ICD-10-CM

## 2020-07-20 LAB
CREAT SERPL-MCNC: 0.44 MG/DL (ref 0.52–1.04)
GFR SERPL CREATININE-BSD FRML MDRD: >90 ML/MIN/{1.73_M2}
HBA1C MFR BLD: 7.8 % (ref 0–5.6)

## 2020-07-20 PROCEDURE — 99213 OFFICE O/P EST LOW 20 MIN: CPT | Performed by: PHYSICIAN ASSISTANT

## 2020-07-20 PROCEDURE — 83036 HEMOGLOBIN GLYCOSYLATED A1C: CPT | Performed by: PHYSICIAN ASSISTANT

## 2020-07-20 PROCEDURE — 36415 COLL VENOUS BLD VENIPUNCTURE: CPT | Performed by: PHYSICIAN ASSISTANT

## 2020-07-20 PROCEDURE — 82565 ASSAY OF CREATININE: CPT | Performed by: PHYSICIAN ASSISTANT

## 2020-07-20 ASSESSMENT — MIFFLIN-ST. JEOR: SCORE: 1599.67

## 2020-07-20 NOTE — PATIENT INSTRUCTIONS
Schedule your diabetic eye exam:   [facility] - [phone]     Check your blood sugar at two different times:  Fasting (at least 8 hours) - your blood sugar should be <130  Two hours after a meal - your blood sugar should be <180    Food items that are high in carbs and will affect your blood sugar: bread, rice, potatoes, pizza, pasta    Zaka i pregled oko dijabeti?daljit:   [facility] [phone]     Provjerite  e?er u dva razli?bruno perioda:  Post (najmanje 8 sati)-tvoj  e?er u krvi bi trebao biti < 130  Dva sata florida benavidez  e?er u krvi bi trebao biti < 180

## 2020-07-20 NOTE — PROGRESS NOTES
Subjective     Jacob Cai is a 59 year old female who presents to clinic today for the following health issues:    HPI       Diabetes Follow-up    How often are you checking your blood sugar? Two times daily  Blood sugar testing frequency justification:  Patient wants to check after eating a bigger meal  What time of day are you checking your blood sugars (select all that apply)?  After meals  Have you had any blood sugars above 200?  Yes sometimes  Have you had any blood sugars below 70?  No    What symptoms do you notice when your blood sugar is low?  None    What concerns do you have today about your diabetes? None and Blood sugar is often over 200     Do you have any of these symptoms? (Select all that apply)  Numbness in feet, Burning in feet and Weight gain    Have you had a diabetic eye exam in the last 12 months? No    Checking sugars 3x per week  Checking after meals - 1 hour      BP Readings from Last 2 Encounters:   07/20/20 103/64   01/28/20 134/74     Hemoglobin A1C (%)   Date Value   07/20/2020 7.8 (H)   05/21/2020 7.9 (H)     LDL Cholesterol Calculated (mg/dL)   Date Value   01/28/2020 83   01/10/2019 110   01/10/2019 110                 How many servings of fruits and vegetables do you eat daily?  2-3    On average, how many sweetened beverages do you drink each day (Examples: soda, juice, sweet tea, etc.  Do NOT count diet or artificially sweetened beverages)?   0    How many days per week do you exercise enough to make your heart beat faster? 3 or less    How many minutes a day do you exercise enough to make your heart beat faster? 9 or less    How many days per week do you miss taking your medication? 0      Patient Active Problem List   Diagnosis     GERD (gastroesophageal reflux disease)     Mild major depression (H)     Degenerative joint disease of knee     Obesity     Hyperlipidemia LDL goal <100     Type 2 diabetes mellitus without complication, without long-term current use of  "insulin (H)     JOSH (generalized anxiety disorder)     Benign essential hypertension with target blood pressure below 140/90     History of thyroidectomy, total     Past Surgical History:   Procedure Laterality Date     ABDOMEN SURGERY  2007    Tumor in the stomach     HYSTERECTOMY, PAP NO LONGER INDICATED       HYSTERECTOMY, [facility]  8/2006       Social History     Tobacco Use     Smoking status: Never Smoker     Smokeless tobacco: Never Used   Substance Use Topics     Alcohol use: No     Family History   Problem Relation Age of Onset     No Known Problems Mother      No Known Problems Father      No Known Problems Brother      No Known Problems Sister            Reviewed and updated as needed this visit by Provider         Review of Systems   Constitutional, endocrine systems are negative, except as otherwise noted.      Objective    /64   Pulse 74   Temp 97.2  F (36.2  C) (Tympanic)   Resp 20   Ht 1.626 m (5' 4\")   Wt 103 kg (227 lb)   SpO2 96%   BMI 38.96 kg/m    Body mass index is 38.96 kg/m .  Physical Exam   GENERAL: healthy, alert and no distress  MS: no gross musculoskeletal defects noted, no edema  SKIN: no suspicious lesions or rashes  NEURO: Normal strength and tone, mentation intact and speech normal  PSYCH: mentation appears normal, affect normal/bright    Diagnostic Test Results:  Results for orders placed or performed in visit on 07/20/20 (from the past 24 hour(s))   Hemoglobin A1c   Result Value Ref Range    Hemoglobin A1C 7.8 (H) 0 - 5.6 %           Assessment & Plan   Assessment  1. Type 2 diabetes mellitus without complication, without long-term current use of insulin (H)         Plan  A1c similar to her last check - we discussed foods she should eat less of and foods she should eat more of. We also talked about when she should be checking her blood sugar. Plan to follow up again in 3 months with a repeat A1c.     Patient Instructions   Schedule your diabetic eye exam:   [facility] - [phone]     Check your blood sugar at two different times:  Fasting (at least 8 hours) - your blood sugar should be <130  Two hours after a meal - your blood sugar should be <180    Food items that are high in carbs and will affect your blood sugar: bread, rice, potatoes, pizza, pasta    Zaka i pregled oko dijabeti?daljit:   Altura Rich [phone]     Provjerite  e?er u dva razli?bruno perioda:  Post (najmanje 8 sati)-tvoj  e?er u krvi bi trebao biti < 130  Dva sata nakon obroka,  e?er u krvi bi trebao biti < 180        BMI:   Estimated body mass index is 38.96 kg/m  as calculated from the following:    Height as of this encounter: 1.626 m (5' 4\").    Weight as of this encounter: 103 kg (227 lb).       Return in about 3 months (around 10/20/2020) for diabetes follow up and a repeat A1c.    Clara Ambriz PA-C  [facility]      "

## 2020-08-05 DIAGNOSIS — E11.9 TYPE 2 DIABETES MELLITUS WITHOUT COMPLICATION, WITHOUT LONG-TERM CURRENT USE OF INSULIN (H): ICD-10-CM

## 2020-08-10 RX ORDER — LIRAGLUTIDE 6 MG/ML
1.8 INJECTION SUBCUTANEOUS DAILY
Qty: 27 ML | Refills: 0 | Status: SHIPPED | OUTPATIENT
Start: 2020-08-10 | End: 2020-11-05

## 2020-08-19 DIAGNOSIS — I10 BENIGN ESSENTIAL HYPERTENSION WITH TARGET BLOOD PRESSURE BELOW 140/90: ICD-10-CM

## 2020-08-19 DIAGNOSIS — E11.9 TYPE 2 DIABETES MELLITUS WITHOUT COMPLICATION, WITHOUT LONG-TERM CURRENT USE OF INSULIN (H): ICD-10-CM

## 2020-08-21 RX ORDER — METFORMIN HCL 500 MG
2000 TABLET, EXTENDED RELEASE 24 HR ORAL DAILY
Qty: 360 TABLET | Refills: 0 | Status: SHIPPED | OUTPATIENT
Start: 2020-08-21 | End: 2020-10-20

## 2020-08-21 RX ORDER — LISINOPRIL 40 MG/1
40 TABLET ORAL DAILY
Qty: 90 TABLET | Refills: 0 | Status: SHIPPED | OUTPATIENT
Start: 2020-08-21 | End: 2021-02-02

## 2020-08-21 NOTE — TELEPHONE ENCOUNTER
Next 5 appointments (look out 90 days)    Oct 20, 2020  9:40 AM CDT  Office Visit with Clara Ambriz PA-C  [facility] [place] ([facility]) [address]  [place] [address]  [phone]        Rx refilled per [facility] refill protocol.    Chantal Mcginnis BSN, RN

## 2020-10-20 ENCOUNTER — OFFICE VISIT (OUTPATIENT)
Dept: FAMILY MEDICINE | Facility: CLINIC | Age: 60
End: 2020-10-20
Payer: MEDICARE

## 2020-10-20 VITALS
BODY MASS INDEX: 38 KG/M2 | TEMPERATURE: 97.6 F | DIASTOLIC BLOOD PRESSURE: 75 MMHG | HEART RATE: 80 BPM | OXYGEN SATURATION: 93 % | SYSTOLIC BLOOD PRESSURE: 124 MMHG | WEIGHT: 221.4 LBS

## 2020-10-20 DIAGNOSIS — E11.9 TYPE 2 DIABETES MELLITUS WITHOUT COMPLICATION, WITHOUT LONG-TERM CURRENT USE OF INSULIN (H): Primary | ICD-10-CM

## 2020-10-20 LAB — HBA1C MFR BLD: 7.3 % (ref 0–5.6)

## 2020-10-20 PROCEDURE — 83036 HEMOGLOBIN GLYCOSYLATED A1C: CPT | Performed by: PHYSICIAN ASSISTANT

## 2020-10-20 PROCEDURE — 36415 COLL VENOUS BLD VENIPUNCTURE: CPT | Performed by: PHYSICIAN ASSISTANT

## 2020-10-20 PROCEDURE — 99213 OFFICE O/P EST LOW 20 MIN: CPT | Performed by: PHYSICIAN ASSISTANT

## 2020-10-20 RX ORDER — GLIPIZIDE 5 MG/1
5 TABLET, FILM COATED, EXTENDED RELEASE ORAL DAILY
Qty: 90 TABLET | Refills: 0 | Status: SHIPPED | OUTPATIENT
Start: 2020-10-20 | End: 2020-12-04

## 2020-10-20 NOTE — PROGRESS NOTES
Subjective     Jacob Cai is a 59 year old female who presents to clinic today for the following health issues:    HPI         Diabetes Follow-up    How often are you checking your blood sugar? One time daily  What time of day are you checking your blood sugars (select all that apply)?  At bedtime  Have you had any blood sugars above 200?  Yes 210-220  Have you had any blood sugars below 70?  No    What symptoms do you notice when your blood sugar is low?  Shaky    What concerns do you have today about your diabetes?  Other: letter from [employer] voluntarily recall Metformine     Do you have any of these symptoms? (Select all that apply)  No numbness or tingling in feet.  No redness, sores or blisters on feet.  No complaints of excessive thirst.  No reports of blurry vision.  No significant changes to weight.    Have you had a diabetic eye exam in the last 12 months? No    Received letter that Metformin XR has been recalled, so she stopped this med      BP Readings from Last 2 Encounters:   10/20/20 124/75   07/20/20 103/64     Hemoglobin A1C (%)   Date Value   10/20/2020 7.3 (H)   07/20/2020 7.8 (H)     LDL Cholesterol Calculated (mg/dL)   Date Value   01/28/2020 83   01/10/2019 110   01/10/2019 110                 How many servings of fruits and vegetables do you eat daily?  0-1    On average, how many sweetened beverages do you drink each day (Examples: soda, juice, sweet tea, etc.  Do NOT count diet or artificially sweetened beverages)?   0    How many days per week do you exercise enough to make your heart beat faster? none    How many minutes a day do you exercise enough to make your heart beat faster? none    How many days per week do you miss taking your medication? 0    Review of Systems   Constitutional, endocrine systems are negative, except as otherwise noted.      Objective    /75   Pulse 80   Temp 97.6  F (36.4  C) (Tympanic)   Wt 100.4 kg (221 lb 6.4 oz)   SpO2 93%   BMI 38.00 kg/m   "  Body mass index is 38 kg/m .  Physical Exam   GENERAL: healthy, alert and no distress  MS: no gross musculoskeletal defects noted, no edema  SKIN: no suspicious lesions or rashes  NEURO: Normal strength and tone, mentation intact and speech normal  PSYCH: mentation appears normal, affect normal/bright    Results for orders placed or performed in visit on 10/20/20 (from the past 24 hour(s))   Hemoglobin A1c   Result Value Ref Range    Hemoglobin A1C 7.3 (H) 0 - 5.6 %           Assessment & Plan     1. Type 2 diabetes mellitus without complication, without long-term current use of insulin (H)         A1c has improved. Patient has already stopped Metformin. Start Glipizide XR 5mg daily. Continue Victoza. Will recheck her A1c in 4 months.        BMI:   Estimated body mass index is 38 kg/m  as calculated from the following:    Height as of 7/20/20: 1.626 m (5' 4\").    Weight as of this encounter: 100.4 kg (221 lb 6.4 oz).        Return in about 4 months (around 2/20/2021) for diabetes follow up and A1c.    KAYLA Raines [facility]    "

## 2021-01-31 DIAGNOSIS — E11.9 TYPE 2 DIABETES MELLITUS WITHOUT COMPLICATION, WITHOUT LONG-TERM CURRENT USE OF INSULIN (H): ICD-10-CM

## 2021-01-31 DIAGNOSIS — I10 BENIGN ESSENTIAL HYPERTENSION WITH TARGET BLOOD PRESSURE BELOW 140/90: ICD-10-CM

## 2021-02-02 RX ORDER — LISINOPRIL 40 MG/1
40 TABLET ORAL DAILY
Qty: 90 TABLET | Refills: 0 | Status: SHIPPED | OUTPATIENT
Start: 2021-02-02 | End: 2021-09-14

## 2021-02-02 RX ORDER — LIRAGLUTIDE 6 MG/ML
1.8 INJECTION SUBCUTANEOUS DAILY
Qty: 9 ML | Refills: 0 | Status: SHIPPED | OUTPATIENT
Start: 2021-02-02 | End: 2021-02-16

## 2021-02-02 NOTE — TELEPHONE ENCOUNTER
Routing refill request to provider for review/approval because:  Needs provider approval, pended with reminder appr due.  Kandi Hurtado RN  [facility]

## 2021-02-16 ENCOUNTER — OFFICE VISIT (OUTPATIENT)
Dept: FAMILY MEDICINE | Facility: CLINIC | Age: 61
End: 2021-02-16
Payer: MEDICARE

## 2021-02-16 VITALS
HEART RATE: 63 BPM | DIASTOLIC BLOOD PRESSURE: 63 MMHG | OXYGEN SATURATION: 95 % | BODY MASS INDEX: 40.1 KG/M2 | WEIGHT: 233.6 LBS | SYSTOLIC BLOOD PRESSURE: 95 MMHG | TEMPERATURE: 97.4 F | RESPIRATION RATE: 20 BRPM

## 2021-02-16 DIAGNOSIS — E11.9 TYPE 2 DIABETES MELLITUS WITHOUT COMPLICATION, WITHOUT LONG-TERM CURRENT USE OF INSULIN (H): Primary | ICD-10-CM

## 2021-02-16 DIAGNOSIS — I10 BENIGN ESSENTIAL HYPERTENSION WITH TARGET BLOOD PRESSURE BELOW 140/90: ICD-10-CM

## 2021-02-16 DIAGNOSIS — Z12.31 ENCOUNTER FOR SCREENING MAMMOGRAM FOR BREAST CANCER: ICD-10-CM

## 2021-02-16 DIAGNOSIS — E78.5 HYPERLIPIDEMIA LDL GOAL <100: ICD-10-CM

## 2021-02-16 LAB
ANION GAP SERPL CALCULATED.3IONS-SCNC: 4 MMOL/L (ref 3–14)
BUN SERPL-MCNC: 15 MG/DL (ref 7–30)
CALCIUM SERPL-MCNC: 9.3 MG/DL (ref 8.5–10.1)
CHLORIDE SERPL-SCNC: 103 MMOL/L (ref 94–109)
CHOLEST SERPL-MCNC: 180 MG/DL
CO2 SERPL-SCNC: 30 MMOL/L (ref 20–32)
CREAT SERPL-MCNC: 0.45 MG/DL (ref 0.52–1.04)
CREAT UR-MCNC: 124 MG/DL
GFR SERPL CREATININE-BSD FRML MDRD: >90 ML/MIN/{1.73_M2}
GLUCOSE SERPL-MCNC: 190 MG/DL (ref 70–99)
HBA1C MFR BLD: 8.5 % (ref 0–5.6)
HDLC SERPL-MCNC: 38 MG/DL
LDLC SERPL CALC-MCNC: 110 MG/DL
MICROALBUMIN UR-MCNC: 12 MG/L
MICROALBUMIN/CREAT UR: 9.76 MG/G CR (ref 0–25)
NONHDLC SERPL-MCNC: 142 MG/DL
POTASSIUM SERPL-SCNC: 4 MMOL/L (ref 3.4–5.3)
SODIUM SERPL-SCNC: 137 MMOL/L (ref 133–144)
TRIGL SERPL-MCNC: 159 MG/DL

## 2021-02-16 PROCEDURE — 99207 PR FOOT EXAM NO CHARGE: CPT | Performed by: PHYSICIAN ASSISTANT

## 2021-02-16 PROCEDURE — 36415 COLL VENOUS BLD VENIPUNCTURE: CPT | Performed by: PHYSICIAN ASSISTANT

## 2021-02-16 PROCEDURE — 80061 LIPID PANEL: CPT | Performed by: PHYSICIAN ASSISTANT

## 2021-02-16 PROCEDURE — 83036 HEMOGLOBIN GLYCOSYLATED A1C: CPT | Performed by: PHYSICIAN ASSISTANT

## 2021-02-16 PROCEDURE — 99214 OFFICE O/P EST MOD 30 MIN: CPT | Performed by: PHYSICIAN ASSISTANT

## 2021-02-16 PROCEDURE — 80048 BASIC METABOLIC PNL TOTAL CA: CPT | Performed by: PHYSICIAN ASSISTANT

## 2021-02-16 PROCEDURE — 82043 UR ALBUMIN QUANTITATIVE: CPT | Performed by: PHYSICIAN ASSISTANT

## 2021-02-16 RX ORDER — GLIPIZIDE 10 MG/1
10 TABLET, FILM COATED, EXTENDED RELEASE ORAL DAILY
Qty: 90 TABLET | Refills: 0 | Status: SHIPPED | OUTPATIENT
Start: 2021-02-16 | End: 2021-06-09

## 2021-02-16 RX ORDER — METFORMIN HCL 500 MG
2000 TABLET, EXTENDED RELEASE 24 HR ORAL DAILY
Qty: 360 TABLET | Refills: 0 | Status: SHIPPED | OUTPATIENT
Start: 2021-02-16 | End: 2021-06-11

## 2021-02-16 RX ORDER — LIRAGLUTIDE 6 MG/ML
1.8 INJECTION SUBCUTANEOUS DAILY
Qty: 27 ML | Refills: 0 | Status: SHIPPED | OUTPATIENT
Start: 2021-02-16 | End: 2021-06-09

## 2021-02-16 NOTE — PROGRESS NOTES
"    Assessment & Plan     1. Type 2 diabetes mellitus without complication, without long-term current use of insulin (H)    2. Hyperlipidemia LDL goal <100    3. Benign essential hypertension with target blood pressure below 140/90    4. Encounter for screening mammogram for breast cancer        1) A1c increased. Increase Glipizide to 10mg every day. Continue Metformin and Victoza, no changes. We discussed dietary changes. Follow up in 3 months for another A1c.     2,3) Routine labs due today.       Review of the result(s) of each unique test - A1c         BMI:   Estimated body mass index is 40.1 kg/m  as calculated from the following:    Height as of 7/20/20: 1.626 m (5' 4\").    Weight as of this encounter: 106 kg (233 lb 9.6 oz).       Return in about 3 months (around 5/16/2021) for diabetes follow up, repeat A1c.    Clara Ambriz PA-C  [facility] James is a 60 year old who presents for the following health issues     HPI       Diabetes Follow-up    How often are you checking your blood sugar? A few times a week  What time of day are you checking your blood sugars (select all that apply)?  After meals  Have you had any blood sugars above 200?  Yes   Have you had any blood sugars below 70?  No    What symptoms do you notice when your blood sugar is low?  Abdominal pain    What concerns do you have today about your diabetes? None     Do you have any of these symptoms? (Select all that apply)  Weight gain    Have you had a diabetic eye exam in the last 12 months? No        BP Readings from Last 2 Encounters:   10/20/20 124/75   07/20/20 103/64     Hemoglobin A1C (%)   Date Value   10/20/2020 7.3 (H)   07/20/2020 7.8 (H)     LDL Cholesterol Calculated (mg/dL)   Date Value   01/28/2020 83   01/10/2019 110   01/10/2019 110               Hypertension Follow-up      Do you check your blood pressure regularly outside of the clinic? Yes     Are you following a low salt diet? " Yes    Are your blood pressures ever more than 140 on the top number (systolic) OR more   than 90 on the bottom number (diastolic), for example 140/90? No      How many servings of fruits and vegetables do you eat daily?  0-1    On average, how many sweetened beverages do you drink each day (Examples: soda, juice, sweet tea, etc.  Do NOT count diet or artificially sweetened beverages)?   0    How many days per week do you exercise enough to make your heart beat faster? none    How many minutes a day do you exercise enough to make your heart beat faster? none    How many days per week do you miss taking your medication? 0      Review of Systems   Constitutional, endocrine systems are negative, except as otherwise noted.      Objective    There were no vitals taken for this visit.  There is no height or weight on file to calculate BMI.  Physical Exam   GENERAL: healthy, alert and no distress  MS: no gross musculoskeletal defects noted, no edema  SKIN: no suspicious lesions or rashes  NEURO: Normal strength and tone, mentation intact and speech normal  PSYCH: mentation appears normal, affect normal/bright  Diabetic foot exam: normal DP and PT pulses, no trophic changes or ulcerative lesions, normal sensory exam, normal monofilament exam and trophic changes bilat heels    Results for orders placed or performed in visit on 02/16/21 (from the past 24 hour(s))   Hemoglobin A1c   Result Value Ref Range    Hemoglobin A1C 8.5 (H) 0 - 5.6 %

## 2021-02-16 NOTE — LETTER
[facility]    [address] [place] [address]  [phone]                                   February 19, 2021    Jacob Cai  [address] [place] [address]        Dear Jacob,    Your kidney function and electrolytes are normal.   I'd like to recheck your A1c in 3 months.     Please call me with any questions or concerns.     Results for orders placed or performed in visit on 02/16/21   BASIC METABOLIC PANEL     Status: Abnormal   Result Value Ref Range    Sodium 137 133 - 144 mmol/L    Potassium 4.0 3.4 - 5.3 mmol/L    Chloride 103 94 - 109 mmol/L    Carbon Dioxide 30 20 - 32 mmol/L    Anion Gap 4 3 - 14 mmol/L    Glucose 190 (H) 70 - 99 mg/dL    Urea Nitrogen 15 7 - 30 mg/dL    Creatinine 0.45 (L) 0.52 - 1.04 mg/dL    GFR Estimate >90 >60 mL/min/[1.73_m2]    GFR Estimate If Black >90 >60 mL/min/[1.73_m2]    Calcium 9.3 8.5 - 10.1 mg/dL   Lipid panel reflex to direct LDL Fasting     Status: Abnormal   Result Value Ref Range    Cholesterol 180 <200 mg/dL    Triglycerides 159 (H) <150 mg/dL    HDL Cholesterol 38 (L) >49 mg/dL    LDL Cholesterol Calculated 110 (H) <100 mg/dL    Non HDL Cholesterol 142 (H) <130 mg/dL   Albumin Random Urine Quantitative with Creat Ratio     Status: None   Result Value Ref Range    Creatinine Urine 124 mg/dL    Albumin Urine mg/L 12 mg/L    Albumin Urine mg/g Cr 9.76 0 - 25 mg/g Cr   Hemoglobin A1c     Status: Abnormal   Result Value Ref Range    Hemoglobin A1C 8.5 (H) 0 - 5.6 %       If you have any questions please call the clinic at [phone]    Sincerely,    Clara Ambriz PA-C    bmd

## 2021-02-19 NOTE — RESULT ENCOUNTER NOTE
Please send the following letter to the patient:    Zehida,    Your kidney function and electrolytes are normal.  I'd like to recheck your A1c in 3 months.    Please call me with any questions or concerns.          Clara Ambriz PA-C

## 2021-03-23 ENCOUNTER — ANCILLARY PROCEDURE (OUTPATIENT)
Dept: MAMMOGRAPHY | Facility: CLINIC | Age: 61
End: 2021-03-23
Attending: PHYSICIAN ASSISTANT
Payer: MEDICARE

## 2021-03-23 DIAGNOSIS — Z12.31 ENCOUNTER FOR SCREENING MAMMOGRAM FOR BREAST CANCER: ICD-10-CM

## 2021-03-23 PROCEDURE — 77067 SCR MAMMO BI INCL CAD: CPT | Mod: TC | Performed by: RADIOLOGY

## 2021-03-25 ENCOUNTER — TRANSFERRED RECORDS (OUTPATIENT)
Dept: MULTI SPECIALTY CLINIC | Facility: CLINIC | Age: 61
End: 2021-03-25

## 2021-03-25 LAB — RETINOPATHY: NORMAL

## 2021-03-29 DIAGNOSIS — E11.9 TYPE 2 DIABETES MELLITUS WITHOUT COMPLICATION, WITHOUT LONG-TERM CURRENT USE OF INSULIN (H): ICD-10-CM

## 2021-03-29 DIAGNOSIS — I10 BENIGN ESSENTIAL HYPERTENSION WITH TARGET BLOOD PRESSURE BELOW 140/90: ICD-10-CM

## 2021-03-30 RX ORDER — GLIPIZIDE 5 MG/1
5 TABLET, FILM COATED, EXTENDED RELEASE ORAL DAILY
Qty: 90 TABLET | Refills: 0 | OUTPATIENT
Start: 2021-03-30

## 2021-03-30 RX ORDER — LISINOPRIL 40 MG/1
40 TABLET ORAL DAILY
Qty: 90 TABLET | Refills: 0 | OUTPATIENT
Start: 2021-03-30

## 2021-03-30 NOTE — TELEPHONE ENCOUNTER
Duplicate, glipizide was a dose increase and should not need Lisinopril yet.  Kandi Hurtado RN  [facility]

## 2021-04-07 DIAGNOSIS — E11.9 TYPE 2 DIABETES MELLITUS WITHOUT COMPLICATION, WITHOUT LONG-TERM CURRENT USE OF INSULIN (H): ICD-10-CM

## 2021-04-08 NOTE — TELEPHONE ENCOUNTER
Routing refill request to provider for review/approval because:  Labs out of range:  Creatine    Hemoglobin A1C   Date Value Ref Range Status   02/16/2021 8.5 (H) 0 - 5.6 % Final     Comment:     Normal <5.7% Prediabetes 5.7-6.4%  Diabetes 6.5% or higher - adopted from ADA   consensus guidelines.         Last Comprehensive Metabolic Panel:  Sodium   Date Value Ref Range Status   02/16/2021 137 133 - 144 mmol/L Final     Potassium   Date Value Ref Range Status   02/16/2021 4.0 3.4 - 5.3 mmol/L Final     Chloride   Date Value Ref Range Status   02/16/2021 103 94 - 109 mmol/L Final     Carbon Dioxide   Date Value Ref Range Status   02/16/2021 30 20 - 32 mmol/L Final     Anion Gap   Date Value Ref Range Status   02/16/2021 4 3 - 14 mmol/L Final     Glucose   Date Value Ref Range Status   02/16/2021 190 (H) 70 - 99 mg/dL Final     Urea Nitrogen   Date Value Ref Range Status   02/16/2021 15 7 - 30 mg/dL Final     Creatinine   Date Value Ref Range Status   02/16/2021 0.45 (L) 0.52 - 1.04 mg/dL Final     GFR Estimate   Date Value Ref Range Status   02/16/2021 >90 >60 mL/min/[1.73_m2] Final     Comment:     Non  GFR Calc  Starting 12/18/2018, serum creatinine based estimated GFR (eGFR) will be   calculated using the Chronic Kidney Disease Epidemiology Collaboration   (CKD-EPI) equation.       Calcium   Date Value Ref Range Status   02/16/2021 9.3 8.5 - 10.1 mg/dL Final     Bilirubin Total   Date Value Ref Range Status   07/19/2010 0.4 0.2 - 1.3 mg/dL Final     Alkaline Phosphatase   Date Value Ref Range Status   07/19/2010 72 40 - 150 U/L Final     ALT   Date Value Ref Range Status   04/30/2012 27 0 - 50 U/L Final     AST   Date Value Ref Range Status   07/19/2010 24 0 - 45 U/L Final

## 2021-04-09 RX ORDER — GLIPIZIDE 5 MG/1
5 TABLET, FILM COATED, EXTENDED RELEASE ORAL DAILY
Qty: 30 TABLET | Refills: 0 | Status: SHIPPED | OUTPATIENT
Start: 2021-04-09 | End: 2021-06-09

## 2021-06-07 DIAGNOSIS — E11.9 TYPE 2 DIABETES MELLITUS WITHOUT COMPLICATION, WITHOUT LONG-TERM CURRENT USE OF INSULIN (H): ICD-10-CM

## 2021-06-08 NOTE — TELEPHONE ENCOUNTER
Routing refill request to provider for review/approval because:  Clarify dose of glipizide.  Pt has appt on 6/14/21  Lab Results   Component Value Date    A1C 8.5 02/16/2021    A1C 7.3 10/20/2020    A1C 7.8 07/20/2020    A1C 7.9 05/21/2020    A1C 8.1 01/28/2020

## 2021-06-09 DIAGNOSIS — E11.9 TYPE 2 DIABETES MELLITUS WITHOUT COMPLICATION, WITHOUT LONG-TERM CURRENT USE OF INSULIN (H): ICD-10-CM

## 2021-06-09 RX ORDER — GLIPIZIDE 5 MG/1
5 TABLET, FILM COATED, EXTENDED RELEASE ORAL DAILY
Qty: 30 TABLET | Refills: 0 | Status: SHIPPED | OUTPATIENT
Start: 2021-06-09 | End: 2021-06-09

## 2021-06-09 RX ORDER — GLIPIZIDE 10 MG/1
10 TABLET, FILM COATED, EXTENDED RELEASE ORAL DAILY
Qty: 30 TABLET | Refills: 0 | Status: SHIPPED | OUTPATIENT
Start: 2021-06-09 | End: 2021-06-14

## 2021-06-09 RX ORDER — LIRAGLUTIDE 6 MG/ML
1.8 INJECTION SUBCUTANEOUS DAILY
Qty: 9 ML | Refills: 0 | Status: SHIPPED | OUTPATIENT
Start: 2021-06-09 | End: 2021-06-14

## 2021-06-09 NOTE — TELEPHONE ENCOUNTER
Called [phone] (home)    Did patient answer the phone: No, left a message on voicemail to return call to the [facility] at [phone].    [facility] Nursing Team

## 2021-06-09 NOTE — TELEPHONE ENCOUNTER
PATIENT SHOULD BE TAKING ONLY ONE OF THE GLIPIZIDES... I BELIEVE SHE'S ON 10MG. SHE'S DUE FOR REPEAT A1C, PLEASE SCHEDULE

## 2021-06-10 NOTE — TELEPHONE ENCOUNTER
Call patient via [facility] .  Patient is taking only one tab of Glipizide 10 mg daily.  Patient already scheduled for this coming Monday 6-14-21 for diabetes follow up.

## 2021-06-11 RX ORDER — METFORMIN HCL 500 MG
2000 TABLET, EXTENDED RELEASE 24 HR ORAL DAILY
Qty: 120 TABLET | Refills: 0 | Status: SHIPPED | OUTPATIENT
Start: 2021-06-11 | End: 2021-06-14

## 2021-06-14 ENCOUNTER — OFFICE VISIT (OUTPATIENT)
Dept: FAMILY MEDICINE | Facility: CLINIC | Age: 61
End: 2021-06-14
Payer: MEDICARE

## 2021-06-14 DIAGNOSIS — F41.9 ANXIETY: ICD-10-CM

## 2021-06-14 DIAGNOSIS — E11.9 TYPE 2 DIABETES MELLITUS WITHOUT COMPLICATION, WITHOUT LONG-TERM CURRENT USE OF INSULIN (H): Primary | ICD-10-CM

## 2021-06-14 DIAGNOSIS — F32.0 MILD MAJOR DEPRESSION (H): ICD-10-CM

## 2021-06-14 LAB — HBA1C MFR BLD: 8.1 % (ref 0–5.6)

## 2021-06-14 PROCEDURE — 36415 COLL VENOUS BLD VENIPUNCTURE: CPT | Performed by: PHYSICIAN ASSISTANT

## 2021-06-14 PROCEDURE — 83036 HEMOGLOBIN GLYCOSYLATED A1C: CPT | Performed by: PHYSICIAN ASSISTANT

## 2021-06-14 PROCEDURE — 99214 OFFICE O/P EST MOD 30 MIN: CPT | Performed by: PHYSICIAN ASSISTANT

## 2021-06-14 RX ORDER — GLIPIZIDE 10 MG/1
20 TABLET, FILM COATED, EXTENDED RELEASE ORAL DAILY
Qty: 180 TABLET | Refills: 0 | Status: SHIPPED | OUTPATIENT
Start: 2021-06-14 | End: 2021-09-14

## 2021-06-14 RX ORDER — LIRAGLUTIDE 6 MG/ML
1.8 INJECTION SUBCUTANEOUS DAILY
Qty: 27 ML | Refills: 0 | Status: SHIPPED | OUTPATIENT
Start: 2021-06-14 | End: 2021-07-29

## 2021-06-14 RX ORDER — DULOXETIN HYDROCHLORIDE 60 MG/1
60 CAPSULE, DELAYED RELEASE ORAL DAILY
Qty: 90 CAPSULE | Refills: 0 | Status: SHIPPED | OUTPATIENT
Start: 2021-06-14 | End: 2022-09-26

## 2021-06-14 RX ORDER — PRAZOSIN HYDROCHLORIDE 5 MG/1
CAPSULE ORAL
Qty: 180 CAPSULE | Refills: 0 | Status: SHIPPED | OUTPATIENT
Start: 2021-06-14 | End: 2022-09-26

## 2021-06-14 RX ORDER — METFORMIN HCL 500 MG
1000 TABLET, EXTENDED RELEASE 24 HR ORAL 2 TIMES DAILY WITH MEALS
Qty: 360 TABLET | Refills: 0 | Status: SHIPPED | OUTPATIENT
Start: 2021-06-14 | End: 2021-09-14

## 2021-06-14 NOTE — PROGRESS NOTES
"    Assessment & Plan     1. Type 2 diabetes mellitus without complication, without long-term current use of insulin (H)    2. Mild major depression (H)    3. Anxiety        1) A1c improved some. Will increase her Glipizide to 20mg daily. Recheck A1c in 3 months.     2,3) I encouraged her to contact her psychiatrist for a follow up appointment/med check. I send refills to her pharmacy while she waits to get in with Dr. Arce. The symptoms in her hands started after she stopped her meds, so we'll see if they resolve after restarting them.      Review of the result(s) of each unique test - A1c  Prescription drug management         BMI:   Estimated body mass index is 40.1 kg/m  as calculated from the following:    Height as of 7/20/20: 1.626 m (5' 4\").    Weight as of 2/16/21: 106 kg (233 lb 9.6 oz).       Return in about 3 months (around 9/14/2021) for diabetes follow up and repeat A1c, OV.    Clara Ambriz PA-C  [facility] James is a 60 year old who presents for the following health issues     HPI     Diabetes Follow-up    How often are you checking your blood sugar? A few times a week  What time of day are you checking your blood sugars (select all that apply)?  After meals  Have you had any blood sugars above 200?  Yes 370  Have you had any blood sugars below 70?  No    What symptoms do you notice when your blood sugar is low?  Other: nausea, abdominal pain    What concerns do you have today about your diabetes? Blood sugar is often over 200, out of medication      Do you have any of these symptoms? (Select all that apply)  No numbness or tingling in feet.  No redness, sores or blisters on feet.  No complaints of excessive thirst.  No reports of blurry vision.  No significant changes to weight.    Have you had a diabetic eye exam in the last 12 months? No      BP Readings from Last 2 Encounters:   02/16/21 95/63   10/20/20 124/75     Hemoglobin A1C (%)   Date Value "   06/14/2021 8.1 (H)   02/16/2021 8.5 (H)     LDL Cholesterol Calculated (mg/dL)   Date Value   02/16/2021 110 (H)   01/28/2020 83                 Depression and Anxiety Follow-Up  How are you doing with your depression since your last visit? Worsened- out of medications for 3wks  How are you doing with your anxiety since your last visit?  Worsened   Are you having other symptoms that might be associated with depression or anxiety? Yes:  Emotional, wants to be alone   Have you had a significant life event? No   Do you have any concerns with your use of alcohol or other drugs? No    Social History     Tobacco Use     Smoking status: Never Smoker     Smokeless tobacco: Never Used   Substance Use Topics     Alcohol use: No     Drug use: No     PHQ 1/28/2020 4/2/2020   PHQ-9 Total Score 3 1   Q9: Thoughts of better off dead/self-harm past 2 weeks Not at all Not at all     JOSH-7 SCORE 1/23/2014 1/28/2020 4/2/2020   Total Score 4 - -   Total Score - 0 4         Suicide Assessment Five-step Evaluation and Treatment (SAFE-T)        Concern - hand and feet  Onset: x3wks  Description: both hand numbness and swelling on both legs down to feet, noticed this started when she ran out of medication    Numbness and burning in hands  x3 weeks   Started after she stopped Zoloft, Cymbalta, and Prasozin       Review of Systems   Constitutional, neuro, endocrine and psych systems are negative, except as otherwise noted.      Objective    There were no vitals taken for this visit.  There is no height or weight on file to calculate BMI.  Physical Exam   GENERAL: healthy, alert and no distress  MS: no gross musculoskeletal defects noted, no edema  SKIN: no suspicious lesions or rashes  NEURO: Normal strength and tone, mentation intact and speech normal  PSYCH: mentation appears normal, affect normal/bright and tearful    Results for orders placed or performed in visit on 06/14/21 (from the past 24 hour(s))   Hemoglobin A1c   Result Value  Ref Range    Hemoglobin A1C 8.1 (H) 0 - 5.6 %

## 2021-07-27 DIAGNOSIS — E11.9 TYPE 2 DIABETES MELLITUS WITHOUT COMPLICATION, WITHOUT LONG-TERM CURRENT USE OF INSULIN (H): ICD-10-CM

## 2021-07-29 RX ORDER — PEN NEEDLE, DIABETIC 29 G X1/2"
NEEDLE, DISPOSABLE MISCELLANEOUS
Qty: 100 EACH | Refills: 0 | Status: SHIPPED | OUTPATIENT
Start: 2021-07-29 | End: 2021-08-24

## 2021-07-29 RX ORDER — LIRAGLUTIDE 6 MG/ML
1.8 INJECTION SUBCUTANEOUS DAILY
Qty: 27 ML | Refills: 0 | Status: SHIPPED | OUTPATIENT
Start: 2021-07-29 | End: 2021-09-14

## 2021-07-29 NOTE — TELEPHONE ENCOUNTER
Routing refill request to provider for review/approval because:  Labs out of range:  Cr    Pended for 90 day supply

## 2021-08-23 DIAGNOSIS — E11.9 TYPE 2 DIABETES MELLITUS WITHOUT COMPLICATION, WITHOUT LONG-TERM CURRENT USE OF INSULIN (H): ICD-10-CM

## 2021-08-24 RX ORDER — PEN NEEDLE, DIABETIC 29 G X1/2"
NEEDLE, DISPOSABLE MISCELLANEOUS
Qty: 100 EACH | Refills: 0 | Status: SHIPPED | OUTPATIENT
Start: 2021-08-24 | End: 2021-12-03

## 2021-08-24 NOTE — TELEPHONE ENCOUNTER
"Requested Prescriptions   Pending Prescriptions Disp Refills     ULTICARE PEN NEEDLES 31G X 5 MM miscellaneous [Pharmacy Med Name: ULTICARE PEN NEEDLE [id number] 31G X 5 MM Miscellaneous] 100 each 0     Sig: USE PEN NEEDLES DAILY OR AS DIRECTED.       Diabetic Supplies Protocol Passed - 8/23/2021  9:32 AM        Passed - Medication is active on med list        Passed - Patient is 18 years of age or older        Passed - Recent (6 mo) or future (30 days) visit within the authorizing provider's specialty     Patient had office visit in the last 6 months or has a visit in the next 30 days with authorizing provider.  See \"Patient Info\" tab in inbasket, or \"Choose Columns\" in Meds & Orders section of the refill encounter.               Prescription approved per [facility] Refill Protocol.    "

## 2021-08-25 RX ORDER — GLIPIZIDE 5 MG/1
5 TABLET, FILM COATED, EXTENDED RELEASE ORAL DAILY
Qty: 30 TABLET | Refills: 0 | OUTPATIENT
Start: 2021-08-25

## 2021-09-14 ENCOUNTER — OFFICE VISIT (OUTPATIENT)
Dept: FAMILY MEDICINE | Facility: CLINIC | Age: 61
End: 2021-09-14
Payer: MEDICARE

## 2021-09-14 VITALS
DIASTOLIC BLOOD PRESSURE: 68 MMHG | BODY MASS INDEX: 38.9 KG/M2 | WEIGHT: 226.6 LBS | OXYGEN SATURATION: 94 % | HEART RATE: 78 BPM | RESPIRATION RATE: 24 BRPM | SYSTOLIC BLOOD PRESSURE: 121 MMHG | TEMPERATURE: 97.6 F

## 2021-09-14 DIAGNOSIS — E11.65 UNCONTROLLED TYPE 2 DIABETES MELLITUS WITH HYPERGLYCEMIA (H): ICD-10-CM

## 2021-09-14 DIAGNOSIS — F32.0 MILD MAJOR DEPRESSION (H): ICD-10-CM

## 2021-09-14 DIAGNOSIS — E11.9 TYPE 2 DIABETES MELLITUS WITHOUT COMPLICATION, WITHOUT LONG-TERM CURRENT USE OF INSULIN (H): Primary | ICD-10-CM

## 2021-09-14 DIAGNOSIS — I10 BENIGN ESSENTIAL HYPERTENSION WITH TARGET BLOOD PRESSURE BELOW 140/90: ICD-10-CM

## 2021-09-14 LAB
HBA1C MFR BLD: 9 % (ref 0–5.6)
HOLD SPECIMEN: NORMAL

## 2021-09-14 PROCEDURE — 99214 OFFICE O/P EST MOD 30 MIN: CPT | Performed by: PHYSICIAN ASSISTANT

## 2021-09-14 PROCEDURE — 36415 COLL VENOUS BLD VENIPUNCTURE: CPT | Performed by: PHYSICIAN ASSISTANT

## 2021-09-14 PROCEDURE — 83036 HEMOGLOBIN GLYCOSYLATED A1C: CPT | Performed by: PHYSICIAN ASSISTANT

## 2021-09-14 RX ORDER — GLIPIZIDE 10 MG/1
20 TABLET, FILM COATED, EXTENDED RELEASE ORAL DAILY
Qty: 180 TABLET | Refills: 0 | Status: SHIPPED | OUTPATIENT
Start: 2021-09-14 | End: 2021-10-18

## 2021-09-14 RX ORDER — LISINOPRIL 40 MG/1
40 TABLET ORAL DAILY
Qty: 90 TABLET | Refills: 1 | Status: SHIPPED | OUTPATIENT
Start: 2021-09-14 | End: 2022-02-07

## 2021-09-14 RX ORDER — METFORMIN HCL 500 MG
1000 TABLET, EXTENDED RELEASE 24 HR ORAL 2 TIMES DAILY WITH MEALS
Qty: 360 TABLET | Refills: 0 | Status: SHIPPED | OUTPATIENT
Start: 2021-09-14 | End: 2021-10-18

## 2021-09-14 RX ORDER — LIRAGLUTIDE 6 MG/ML
1.8 INJECTION SUBCUTANEOUS DAILY
Qty: 27 ML | Refills: 0 | Status: SHIPPED | OUTPATIENT
Start: 2021-09-14 | End: 2021-12-03

## 2021-09-14 NOTE — PROGRESS NOTES
Assessment & Plan     1. Type 2 diabetes mellitus without complication, without long-term current use of insulin (H)    2. Uncontrolled type 2 diabetes mellitus with hyperglycemia (H)        1,2) Her A1c has increased from June. She is maxed out on 3 non-insulin medications: Metformin, Glipizide, and Victoza. Will have her follow up with diabetic education for insulin and Kandace meter instruction. Will recheck her A1c 3 months after starting insulin.      Prescription drug management         Return in about 4 months (around 1/14/2022) for diabetes follow up, repeat A1c, OV - 40 mins.    Clara Ambriz PA-C  [facility] WILLY James is a 60 year old who presents for the following health issues  accompanied by her :    HPI     Diabetes Follow-up    How often are you checking your blood sugar? A few times a week  What time of day are you checking your blood sugars (select all that apply)?  At bedtime  Have you had any blood sugars above 200?  Yes 220-225  Have you had any blood sugars below 70?  No    What symptoms do you notice when your blood sugar is low?  None    What concerns do you have today about your diabetes? None     Do you have any of these symptoms? (Select all that apply)  No numbness or tingling in feet.  No redness, sores or blisters on feet.  No complaints of excessive thirst.  No reports of blurry vision.  No significant changes to weight.    Have you had a diabetic eye exam in the last 12 months? Yes- Date of last eye exam: this year,  [facility]        BP Readings from Last 2 Encounters:   09/14/21 121/68   02/16/21 95/63     Hemoglobin A1C (%)   Date Value   09/14/2021 9.0 (H)   06/14/2021 8.1 (H)   02/16/2021 8.5 (H)     LDL Cholesterol Calculated (mg/dL)   Date Value   02/16/2021 110 (H)   01/28/2020 83                 Review of Systems   Constitutional, endocrine systems are negative, except as otherwise noted.      Objective     /68   Pulse 78   Temp 97.6  F (36.4  C) (Tympanic)   Resp 24   Wt 102.8 kg (226 lb 9.6 oz)   SpO2 94%   BMI 38.90 kg/m    Body mass index is 38.9 kg/m .  Physical Exam   GENERAL: healthy, alert and no distress  MS: no gross musculoskeletal defects noted, no edema  SKIN: no suspicious lesions or rashes  NEURO: Normal strength and tone, mentation intact and speech normal  PSYCH: mentation appears normal, affect normal/bright    Results for orders placed or performed in visit on 09/14/21 (from the past 24 hour(s))   **A1C FUTURE 3mo   Result Value Ref Range    Hemoglobin A1C 9.0 (H) 0.0 - 5.6 %   Extra Tube    Narrative    The following orders were created for panel order Extra Tube.  Procedure                               Abnormality         Status                     ---------                               -----------         ------                     Extra Red Top Tube[[id number]]                               In process                 Extra Green Top (Lithium...[[id number]]                      In process                 Extra Purple Top Tube[[id number]]                            In process                   Please view results for these tests on the individual orders.       ----- Services Performed by a MEDICAL STUDENT in Presence of ATTENDING Physician-------      I attest that I was present during the patient visit, have verified the HPI, and performed an examination today.

## 2021-10-08 DIAGNOSIS — E11.9 TYPE 2 DIABETES MELLITUS WITHOUT COMPLICATION, WITHOUT LONG-TERM CURRENT USE OF INSULIN (H): ICD-10-CM

## 2021-10-11 RX ORDER — GLIPIZIDE 5 MG/1
5 TABLET, FILM COATED, EXTENDED RELEASE ORAL DAILY
Qty: 30 TABLET | Refills: 0 | OUTPATIENT
Start: 2021-10-11

## 2021-10-13 DIAGNOSIS — I10 BENIGN ESSENTIAL HYPERTENSION WITH TARGET BLOOD PRESSURE BELOW 140/90: ICD-10-CM

## 2021-10-13 DIAGNOSIS — E78.5 HYPERLIPIDEMIA LDL GOAL <100: ICD-10-CM

## 2021-10-13 DIAGNOSIS — E11.9 TYPE 2 DIABETES MELLITUS WITHOUT COMPLICATION, WITHOUT LONG-TERM CURRENT USE OF INSULIN (H): ICD-10-CM

## 2021-10-18 RX ORDER — SIMVASTATIN 20 MG
20 TABLET ORAL AT BEDTIME
Qty: 90 TABLET | Refills: 3 | OUTPATIENT
Start: 2021-10-18

## 2021-10-18 RX ORDER — GLIPIZIDE 10 MG/1
20 TABLET, FILM COATED, EXTENDED RELEASE ORAL DAILY
Qty: 180 TABLET | Refills: 0 | Status: SHIPPED | OUTPATIENT
Start: 2021-10-18 | End: 2022-02-07

## 2021-10-18 RX ORDER — METOPROLOL SUCCINATE 100 MG/1
100 TABLET, EXTENDED RELEASE ORAL DAILY
Qty: 90 TABLET | Refills: 3 | OUTPATIENT
Start: 2021-10-18

## 2021-10-18 RX ORDER — HYDROCHLOROTHIAZIDE 25 MG/1
25 TABLET ORAL DAILY
Qty: 90 TABLET | Refills: 0 | Status: SHIPPED | OUTPATIENT
Start: 2021-10-18 | End: 2021-12-03

## 2021-10-18 RX ORDER — INSULIN DETEMIR 100 [IU]/ML
INJECTION, SOLUTION SUBCUTANEOUS
Qty: 15 ML | Refills: 0 | Status: SHIPPED | OUTPATIENT
Start: 2021-10-18 | End: 2021-12-24

## 2021-10-18 RX ORDER — AMLODIPINE BESYLATE 10 MG/1
10 TABLET ORAL DAILY
Qty: 90 TABLET | Refills: 0 | Status: SHIPPED | OUTPATIENT
Start: 2021-10-18 | End: 2021-12-03

## 2021-10-18 RX ORDER — METFORMIN HCL 500 MG
1000 TABLET, EXTENDED RELEASE 24 HR ORAL 2 TIMES DAILY WITH MEALS
Qty: 360 TABLET | Refills: 0 | Status: SHIPPED | OUTPATIENT
Start: 2021-10-18 | End: 2022-02-07

## 2021-10-18 NOTE — TELEPHONE ENCOUNTER
Prescription approved per [facility] Refill Protocol.  Malu De Los Santos RN

## 2021-11-05 DIAGNOSIS — E78.5 HYPERLIPIDEMIA LDL GOAL <100: ICD-10-CM

## 2021-11-05 DIAGNOSIS — I10 BENIGN ESSENTIAL HYPERTENSION WITH TARGET BLOOD PRESSURE BELOW 140/90: ICD-10-CM

## 2021-11-08 RX ORDER — SIMVASTATIN 20 MG
20 TABLET ORAL AT BEDTIME
Qty: 90 TABLET | Refills: 0 | Status: SHIPPED | OUTPATIENT
Start: 2021-11-08 | End: 2021-12-03

## 2021-11-08 RX ORDER — METOPROLOL SUCCINATE 100 MG/1
100 TABLET, EXTENDED RELEASE ORAL DAILY
Qty: 90 TABLET | Refills: 0 | Status: SHIPPED | OUTPATIENT
Start: 2021-11-08 | End: 2021-12-03

## 2021-11-11 ENCOUNTER — VIRTUAL VISIT (OUTPATIENT)
Dept: EDUCATION SERVICES | Facility: CLINIC | Age: 61
End: 2021-11-11
Payer: MEDICARE

## 2021-11-11 DIAGNOSIS — E11.9 TYPE 2 DIABETES MELLITUS WITHOUT COMPLICATION, WITHOUT LONG-TERM CURRENT USE OF INSULIN (H): ICD-10-CM

## 2021-11-11 DIAGNOSIS — Z79.4 TYPE 2 DIABETES MELLITUS TREATED WITH INSULIN (H): Primary | ICD-10-CM

## 2021-11-11 DIAGNOSIS — E11.9 TYPE 2 DIABETES MELLITUS TREATED WITH INSULIN (H): Primary | ICD-10-CM

## 2021-11-11 PROCEDURE — G0108 DIAB MANAGE TRN  PER INDIV: HCPCS | Performed by: DIETITIAN, REGISTERED

## 2021-11-11 NOTE — PROGRESS NOTES
PHONE Diabetes Self-Management Education & Support    Presents for: Individual review  Assisted by Ana María  [id number]    Type of Visit: Telephone Visit    How would patient like to obtain AVS? Mail a copy    ASSESSMENT:  Jacob is referred for insulin start and Kandace instruction.   She reports she is already taking the Levemir insulin as prescribed (10 units). Says she has not received the Libre2 and not sure what it is.   ~ written order for up to 15 units insulin/day, Libre2 sensors are in med list      A1C is up to 9.0% from 8.1% in June.   She is able to name her diabetes meds.     After our discussion, plan is to start the Libre2 - but she does not have a computer to share data. She is willing to go to [facility] only, and there is no DB educator there to download the Kandace.     Patient's most recent   Lab Results   Component Value Date    A1C 9.0 09/14/2021    A1C 8.1 06/14/2021    is not meeting goal of <7.0    Diabetes knowledge and skills assessment:   Patient is knowledgeable in diabetes management concepts related to: will benefit from comprehensive review    Continue education with the following diabetes management concepts: Healthy Eating, Being Active, Monitoring, Taking Medication, Problem Solving, Reducing Risks and Healthy Coping    Based on learning assessment above, most appropriate setting for further diabetes education would be: Individual setting.    INTERVENTIONS:    Education provided today on:  AADE Self-Care Behaviors:  Monitoring: purpose, log and interpret results, individual blood glucose targets, frequency of monitoring and rec scan 5+ times per day with Kandace (need every 8 hours minimum to collect data)  ~ what the Kanadce is, how it is used    Taking Medication: proper site selection and rotation for injections, side effects of prescribed medications, when to take medications and confirmed continue taking current diabetes meds (pills and victoza) with the insuiln    Reducing  "Risks: A1C - goals, relating to blood glucose levels, how often to check and BG targets  ~ hypoglycemia sx & tx reviewed    Healthy Coping: benefits of making appropriate lifestyle changes    Opportunities for ongoing education and support in diabetes-self management were discussed. Pt verbalized understanding of concepts discussed and recommendations provided today.       Education Materials Provided:  BG Log Sheet    PLAN  1) When you get the Libre2, begin monitoring 5+ times/day, before or 2-hours after meals, and write down results  2) continue current diabetes medication including Levemir 10 units    Sent Rx for Libre2 reader and sensors to  Specialty mail order, her local pharmacy says the do not do \"part B\".     Topics to cover at upcoming visits: Healthy Eating, Being Active, Taking Medication and Reducing Risks  Follow-up: 12/3    See Goals Section for co-developed, patient-stated behavior change goals.      SUBJECTIVE / OBJECTIVE:  Presents for: Individual review  Accompanied by: Self,  Diabetes type: Type 2  Date of diagnosis: 2012  Disease course: Stable (sometimes it is good, sometimes it is bad)  Other concerns:: Language barrier ([facility] )  Cultural Influences/Ethnic Background:  [facility]      Diabetes Symptoms & Complications:    Patient Problem List and Family Medical History reviewed for relevant medical history, current medical status, and diabetes risk factors.    Vitals:  There were no vitals taken for this visit.  Estimated body mass index is 38.9 kg/m  as calculated from the following:    Height as of 7/20/20: 1.626 m (5' 4\").    Weight as of 9/14/21: 102.8 kg (226 lb 9.6 oz).   Last 3 BP:   BP Readings from Last 3 Encounters:   09/14/21 121/68   02/16/21 95/63   10/20/20 124/75       History   Smoking Status     Never Smoker   Smokeless Tobacco     Never Used       Labs:  Lab Results   Component Value Date    A1C 9.0 09/14/2021    A1C 8.1 06/14/2021     Lab " "Results   Component Value Date     02/16/2021     Lab Results   Component Value Date     02/16/2021     HDL Cholesterol   Date Value Ref Range Status   02/16/2021 38 (L) >49 mg/dL Final   ]  GFR Estimate   Date Value Ref Range Status   02/16/2021 >90 >60 mL/min/[1.73_m2] Final     Comment:     Non  GFR Calc  Starting 12/18/2018, serum creatinine based estimated GFR (eGFR) will be   calculated using the Chronic Kidney Disease Epidemiology Collaboration   (CKD-EPI) equation.       GFR Estimate If Black   Date Value Ref Range Status   02/16/2021 >90 >60 mL/min/[1.73_m2] Final     Comment:      GFR Calc  Starting 12/18/2018, serum creatinine based estimated GFR (eGFR) will be   calculated using the Chronic Kidney Disease Epidemiology Collaboration   (CKD-EPI) equation.       Lab Results   Component Value Date    CR 0.45 02/16/2021     No results found for: MICROALBUMIN    Healthy Eating:  Healthy Eating Assessed Today: No  Meal planning/habits:  (One Touch Ultra Mini)    Being Active:   Not assessed    Monitoring:  Monitoring Assessed Today: Yes (a Kandace CGM was ordered, she does not have it yet)  Did patient bring glucose meter to appointment? : No  Times checking blood sugar at home (number):  (\"sometimes\" 165, 180, depends what I eat, if I eat more it goes up)  Times checking blood sugar at home (per):  (mostly in the evening at night)  ~ she requests more BG strips until the Kandace arrives      Taking Medications:  Diabetes Medication(s)     Biguanides       metFORMIN (GLUCOPHAGE-XR) 500 MG 24 hr tablet    TAKE 2 TABLETS (1,000 MG) BY MOUTH 2 TIMES DAILY (WITH MEALS)    Insulin       LEVEMIR FLEXTOUCH 100 UNIT/ML pen    INJECT 10 UNITS SUBCUTANEOUS AT BEDTIME    Sulfonylureas       glipiZIDE (GLUCOTROL XL) 10 MG 24 hr tablet    TAKE 2 TABLETS (20 MG) BY MOUTH DAILY    Incretin Mimetic Agents (GLP-1 Receptor Agonists)       liraglutide (VICTOZA PEN) 18 MG/3ML solution    " "Inject 1.8 mg Subcutaneous daily          Taking Medication Assessed Today: Yes (she is aware of insulin start, is already taking it)  Current Treatments: Oral Medication (taken by mouth),Non-insulin Injectables,Insulin Injections (Metformin, Glipizide, Victoza)  Dose schedule: At bedtime (she has recently started Levemir, takes Victoza at HS too)  Given by: Patient  Injection/Infusion sites: Abdomen  Diabetes medication side effects?: No    Problem Solving:  Problem Solving Assessed Today: Yes  Is the patient at risk for hypoglycemia?: Yes  Hypoglycemia Frequency: Rarely (says legs and arms shake if low)  Hypoglycemia Treatment:  (\"I eat something\", depends- cake or coffee with a little sugar)    Hypoglycemia symptoms  Feeling shaky: Yes (arms and legs)       Reducing Risks:  Reducing Risks Assessed Today: No    Healthy Coping:  Healthy Coping Assessed Today: Yes  Emotional response to diabetes: Ready to learn  Informal Support system:: Children (her daughter)  Stage of change: ACTION (Actively working towards change)  Support resources: None  Patient Activation Measure Survey Score:  TED Score (Last Two) 6/7/2013   TED Raw Score 39   Activation Score 56.4   TED Level 3         Damaris Chavarria RD, LD, [facility]    Time Spent: 47 minutes  Encounter Type: Individual      Any diabetes medication dose changes were made via the Certified Diabetes Care & Education Protocol in collaboration with the patient's referring provider. A copy of this encounter was shared with the provider.        "

## 2021-11-11 NOTE — Clinical Note
Just a note, Jacob is off to a good start with diabetes ed- working on getting her the Kandace CGM, she is already taking insulin but we need more data to make adjustments.  Follow up 12/3.  Thanks! Damaris Chavarria RD, LD, [facility]

## 2021-11-11 NOTE — LETTER
11/11/2021         RE: Jacob Cai  [address] [place] [facility] [address]        Dear Colleague,    Thank you for referring your patient, Jacob Cai, to the [facility]. Please see a copy of my visit note below.    PHONE Diabetes Self-Management Education & Support    Presents for: Individual review  Assisted by Ana María  [id number]    Type of Visit: Telephone Visit    How would patient like to obtain AVS? Mail a copy    ASSESSMENT:  Jacob is referred for insulin start and Kandace instruction.   She reports she is already taking the Levemir insulin as prescribed (10 units). Says she has not received the Libre2 and not sure what it is.   ~ written order for up to 15 units insulin/day, Libre2 sensors are in med list      A1C is up to 9.0% from 8.1% in June.   She is able to name her diabetes meds.     After our discussion, plan is to start the Libre2 - but she does not have a computer to share data. She is willing to go to [facility] only, and there is no DB educator there to download the Kandace.     Patient's most recent   Lab Results   Component Value Date    A1C 9.0 09/14/2021    A1C 8.1 06/14/2021    is not meeting goal of <7.0    Diabetes knowledge and skills assessment:   Patient is knowledgeable in diabetes management concepts related to: will benefit from comprehensive review    Continue education with the following diabetes management concepts: Healthy Eating, Being Active, Monitoring, Taking Medication, Problem Solving, Reducing Risks and Healthy Coping    Based on learning assessment above, most appropriate setting for further diabetes education would be: Individual setting.    INTERVENTIONS:    Education provided today on:  AADE Self-Care Behaviors:  Monitoring: purpose, log and interpret results, individual blood glucose targets, frequency of monitoring and rec scan 5+ times per day with Kandace (need every 8 hours minimum to collect data)  ~ what the  "Kandace is, how it is used    Taking Medication: proper site selection and rotation for injections, side effects of prescribed medications, when to take medications and confirmed continue taking current diabetes meds (pills and victoza) with the insuiln    Reducing Risks: A1C - goals, relating to blood glucose levels, how often to check and BG targets  ~ hypoglycemia sx & tx reviewed    Healthy Coping: benefits of making appropriate lifestyle changes    Opportunities for ongoing education and support in diabetes-self management were discussed. Pt verbalized understanding of concepts discussed and recommendations provided today.       Education Materials Provided:  BG Log Sheet    PLAN  1) When you get the Libre2, begin monitoring 5+ times/day, before or 2-hours after meals, and write down results  2) continue current diabetes medication including Levemir 10 units    Sent Rx for Libre2 reader and sensors to  Specialty mail order, her local pharmacy says the do not do \"part B\".     Topics to cover at upcoming visits: Healthy Eating, Being Active, Taking Medication and Reducing Risks  Follow-up: 12/3    See Goals Section for co-developed, patient-stated behavior change goals.      SUBJECTIVE / OBJECTIVE:  Presents for: Individual review  Accompanied by: Self,  Diabetes type: Type 2  Date of diagnosis: 2012  Disease course: Stable (sometimes it is good, sometimes it is bad)  Other concerns:: Language barrier ([facility] )  Cultural Influences/Ethnic Background:  [facility]      Diabetes Symptoms & Complications:    Patient Problem List and Family Medical History reviewed for relevant medical history, current medical status, and diabetes risk factors.    Vitals:  There were no vitals taken for this visit.  Estimated body mass index is 38.9 kg/m  as calculated from the following:    Height as of 7/20/20: 1.626 m (5' 4\").    Weight as of 9/14/21: 102.8 kg (226 lb 9.6 oz).   Last 3 BP:   BP " "Readings from Last 3 Encounters:   09/14/21 121/68   02/16/21 95/63   10/20/20 124/75       History   Smoking Status     Never Smoker   Smokeless Tobacco     Never Used       Labs:  Lab Results   Component Value Date    A1C 9.0 09/14/2021    A1C 8.1 06/14/2021     Lab Results   Component Value Date     02/16/2021     Lab Results   Component Value Date     02/16/2021     HDL Cholesterol   Date Value Ref Range Status   02/16/2021 38 (L) >49 mg/dL Final   ]  GFR Estimate   Date Value Ref Range Status   02/16/2021 >90 >60 mL/min/[1.73_m2] Final     Comment:     Non  GFR Calc  Starting 12/18/2018, serum creatinine based estimated GFR (eGFR) will be   calculated using the Chronic Kidney Disease Epidemiology Collaboration   (CKD-EPI) equation.       GFR Estimate If Black   Date Value Ref Range Status   02/16/2021 >90 >60 mL/min/[1.73_m2] Final     Comment:      GFR Calc  Starting 12/18/2018, serum creatinine based estimated GFR (eGFR) will be   calculated using the Chronic Kidney Disease Epidemiology Collaboration   (CKD-EPI) equation.       Lab Results   Component Value Date    CR 0.45 02/16/2021     No results found for: MICROALBUMIN    Healthy Eating:  Healthy Eating Assessed Today: No  Meal planning/habits:  (One Touch Ultra Mini)    Being Active:   Not assessed    Monitoring:  Monitoring Assessed Today: Yes (a Kandace CGM was ordered, she does not have it yet)  Did patient bring glucose meter to appointment? : No  Times checking blood sugar at home (number):  (\"sometimes\" 165, 180, depends what I eat, if I eat more it goes up)  Times checking blood sugar at home (per):  (mostly in the evening at night)  ~ she requests more BG strips until the Kandace arrives      Taking Medications:  Diabetes Medication(s)     Biguanides       metFORMIN (GLUCOPHAGE-XR) 500 MG 24 hr tablet    TAKE 2 TABLETS (1,000 MG) BY MOUTH 2 TIMES DAILY (WITH MEALS)    Insulin       LEVEMIR FLEXTOUCH 100 " "UNIT/ML pen    INJECT 10 UNITS SUBCUTANEOUS AT BEDTIME    Sulfonylureas       glipiZIDE (GLUCOTROL XL) 10 MG 24 hr tablet    TAKE 2 TABLETS (20 MG) BY MOUTH DAILY    Incretin Mimetic Agents (GLP-1 Receptor Agonists)       liraglutide (VICTOZA PEN) 18 MG/3ML solution    Inject 1.8 mg Subcutaneous daily          Taking Medication Assessed Today: Yes (she is aware of insulin start, is already taking it)  Current Treatments: Oral Medication (taken by mouth),Non-insulin Injectables,Insulin Injections (Metformin, Glipizide, Victoza)  Dose schedule: At bedtime (she has recently started Levemir, takes Victoza at HS too)  Given by: Patient  Injection/Infusion sites: Abdomen  Diabetes medication side effects?: No    Problem Solving:  Problem Solving Assessed Today: Yes  Is the patient at risk for hypoglycemia?: Yes  Hypoglycemia Frequency: Rarely (says legs and arms shake if low)  Hypoglycemia Treatment:  (\"I eat something\", depends- cake or coffee with a little sugar)    Hypoglycemia symptoms  Feeling shaky: Yes (arms and legs)       Reducing Risks:  Reducing Risks Assessed Today: No    Healthy Coping:  Healthy Coping Assessed Today: Yes  Emotional response to diabetes: Ready to learn  Informal Support system:: Children (her daughter)  Stage of change: ACTION (Actively working towards change)  Support resources: None  Patient Activation Measure Survey Score:  TED Score (Last Two) 6/7/2013   TED Raw Score 39   Activation Score 56.4   TED Level 3         Damaris Chavarria RD, LD, [facility]    Time Spent: 47 minutes  Encounter Type: Individual      Any diabetes medication dose changes were made via the Certified Diabetes Care & Education Protocol in collaboration with the patient's referring provider. A copy of this encounter was shared with the provider.              "

## 2021-11-12 DIAGNOSIS — E11.9 TYPE 2 DIABETES MELLITUS WITHOUT COMPLICATION, WITHOUT LONG-TERM CURRENT USE OF INSULIN (H): ICD-10-CM

## 2021-11-30 DIAGNOSIS — E11.9 TYPE 2 DIABETES MELLITUS WITHOUT COMPLICATION, WITHOUT LONG-TERM CURRENT USE OF INSULIN (H): ICD-10-CM

## 2021-11-30 DIAGNOSIS — I10 BENIGN ESSENTIAL HYPERTENSION WITH TARGET BLOOD PRESSURE BELOW 140/90: ICD-10-CM

## 2021-11-30 DIAGNOSIS — E78.5 HYPERLIPIDEMIA LDL GOAL <100: ICD-10-CM

## 2021-12-03 ENCOUNTER — VIRTUAL VISIT (OUTPATIENT)
Dept: EDUCATION SERVICES | Facility: CLINIC | Age: 61
End: 2021-12-03
Payer: MEDICARE

## 2021-12-03 ENCOUNTER — TELEPHONE (OUTPATIENT)
Dept: FAMILY MEDICINE | Facility: CLINIC | Age: 61
End: 2021-12-03

## 2021-12-03 DIAGNOSIS — Z79.4 TYPE 2 DIABETES MELLITUS TREATED WITH INSULIN (H): Primary | ICD-10-CM

## 2021-12-03 DIAGNOSIS — E11.9 TYPE 2 DIABETES MELLITUS TREATED WITH INSULIN (H): Primary | ICD-10-CM

## 2021-12-03 PROCEDURE — G0108 DIAB MANAGE TRN  PER INDIV: HCPCS

## 2021-12-03 RX ORDER — BLOOD-GLUCOSE METER
1 EACH MISCELLANEOUS ONCE
Qty: 1 KIT | Refills: 0 | Status: SHIPPED | OUTPATIENT
Start: 2021-12-03 | End: 2021-12-03

## 2021-12-03 RX ORDER — SIMVASTATIN 20 MG
20 TABLET ORAL AT BEDTIME
Qty: 90 TABLET | Refills: 0 | Status: SHIPPED | OUTPATIENT
Start: 2021-12-03 | End: 2022-02-07

## 2021-12-03 RX ORDER — AMLODIPINE BESYLATE 10 MG/1
10 TABLET ORAL DAILY
Qty: 90 TABLET | Refills: 0 | Status: SHIPPED | OUTPATIENT
Start: 2021-12-03 | End: 2022-02-07

## 2021-12-03 RX ORDER — LIRAGLUTIDE 6 MG/ML
1.8 INJECTION SUBCUTANEOUS DAILY
Qty: 27 ML | Refills: 0 | Status: SHIPPED | OUTPATIENT
Start: 2021-12-03 | End: 2022-02-07

## 2021-12-03 RX ORDER — BLOOD SUGAR DIAGNOSTIC
STRIP MISCELLANEOUS
Qty: 50 STRIP | Refills: 11 | Status: SHIPPED | OUTPATIENT
Start: 2021-12-03 | End: 2022-09-26

## 2021-12-03 RX ORDER — LANCETS 33 GAUGE
1 EACH MISCELLANEOUS 2 TIMES DAILY
Qty: 100 EACH | Refills: 4 | Status: SHIPPED | OUTPATIENT
Start: 2021-12-03 | End: 2022-09-26

## 2021-12-03 RX ORDER — METOPROLOL SUCCINATE 100 MG/1
100 TABLET, EXTENDED RELEASE ORAL DAILY
Qty: 90 TABLET | Refills: 0 | Status: SHIPPED | OUTPATIENT
Start: 2021-12-03 | End: 2022-02-07

## 2021-12-03 RX ORDER — PEN NEEDLE, DIABETIC 29 G X1/2"
NEEDLE, DISPOSABLE MISCELLANEOUS
Qty: 100 EACH | Refills: 0 | Status: SHIPPED | OUTPATIENT
Start: 2021-12-03 | End: 2022-01-26

## 2021-12-03 RX ORDER — HYDROCHLOROTHIAZIDE 25 MG/1
25 TABLET ORAL DAILY
Qty: 90 TABLET | Refills: 0 | Status: SHIPPED | OUTPATIENT
Start: 2021-12-03 | End: 2022-02-07

## 2021-12-03 NOTE — TELEPHONE ENCOUNTER
The diabetes educator called and stated that during her visit with patient today, she was crying because she is out of the depression medication, and she is feeling more depressed because of this.  Denies thoughts of harming herself or others.    Looks like it was last filled on 6/14/21 # 90 with 0 refills.  Last OV 9/14/21 with PCP.    Routed to PCP to please advise on medication refill above.    Will need to call patient back with answer.    When calling patient back,  Please make sure this is the correct pharmacy.        Marylu RN,BSN  Triage Nurse  [facility]: [facility]  Ph: [phone]

## 2021-12-03 NOTE — TELEPHONE ENCOUNTER
Called patient using [facility]  # [phone].    Writer asked patient is she is still seeing Dr. Arce.  She stated that she is.  Also, asked if he prescribes her medications.  She stated that yes he does.    Advised to call him and advise him that she is feeling more depressed, and to advise on her refills.    Patient stated understanding and agreeable with the plan of care.     Marylu RN,BSN  Triage Nurse  [facility]: [facility]

## 2021-12-03 NOTE — PROGRESS NOTES
PHONE Diabetes Follow-up    Subjective/Objective:    Jacob Cai sent in blood glucose log for review. Last date of communication was: 11/11.  Assisted by Ana María  today, # [phone]    Diabetes is being managed with   Diabetes Medications   Diabetes Medication(s)     Biguanides       metFORMIN (GLUCOPHAGE-XR) 500 MG 24 hr tablet    TAKE 2 TABLETS (1,000 MG) BY MOUTH 2 TIMES DAILY (WITH MEALS)    Insulin       LEVEMIR FLEXTOUCH 100 UNIT/ML pen    INJECT 10 UNITS SUBCUTANEOUS AT BEDTIME    Sulfonylureas       glipiZIDE (GLUCOTROL XL) 10 MG 24 hr tablet    TAKE 2 TABLETS (20 MG) BY MOUTH DAILY    Incretin Mimetic Agents (GLP-1 Receptor Agonists)       VICTOZA PEN 18 MG/3ML soln    INJECT 1.8 MG SUBCUTANEOUS DAILY          BG/Food Log:   One Touch (says there is no other name like Ultra or Kathie etc)- says it is well over a year old    Last visit, 11/11, ordered a Kandace 2 CGM for Jacob through  specialty pharmacy.   She does not recall what that is, says she did not receive anything in the mail.   Will put this on hold for now- she is not willing to go to any clinic except [place] and there is not a CDE there on site to help teach her how to use it.    Jacob says she is out of BG test strips so has not been able to check (didn't understand that she would get the CGM - but has not).  Ordered updated supplies today- Verio, strips, lancets (to [facility] in [place] per her request)    Assessment:  Jacob does not have strips to check BG- we ordered them today.     She recently started on Levemir insulin 10 units/day, but we can't see how it's working without numbers, unable to make changes today.    She is crying during our phone visit, says she is so depressed, she is out of her depression pills.     Plan/Response:  See Patient Instructions for co-developed, patient-stated behavior change goals.    ~Check blood sugars fasting and 2 hours after the start of meals ( alternating meals from day to  "day)    ~ Called PCP clinic and spoke to Marylu RN there. Explained that Jacob is out of \"depression medication\" - Marylu says she will take care of it and let patient know.     F/U scheduled 12/24- BG assess and med titration    Damaris Chavarria RD, LD, [facility]  Time spent 37 minutes    Any diabetes medication dose changes were made via the CDE Protocol and Collaborative Practice Agreement with the patient's referring provider.  "

## 2021-12-15 ENCOUNTER — TELEPHONE (OUTPATIENT)
Dept: FAMILY MEDICINE | Facility: CLINIC | Age: 61
End: 2021-12-15
Payer: MEDICARE

## 2021-12-17 ENCOUNTER — MEDICAL CORRESPONDENCE (OUTPATIENT)
Dept: HEALTH INFORMATION MANAGEMENT | Facility: CLINIC | Age: 61
End: 2021-12-17
Payer: MEDICARE

## 2021-12-24 ENCOUNTER — VIRTUAL VISIT (OUTPATIENT)
Dept: EDUCATION SERVICES | Facility: CLINIC | Age: 61
End: 2021-12-24
Payer: MEDICARE

## 2021-12-24 DIAGNOSIS — E11.9 TYPE 2 DIABETES MELLITUS WITHOUT COMPLICATION, WITHOUT LONG-TERM CURRENT USE OF INSULIN (H): Primary | ICD-10-CM

## 2021-12-24 PROCEDURE — G0108 DIAB MANAGE TRN  PER INDIV: HCPCS | Mod: 95 | Performed by: DIETITIAN, REGISTERED

## 2021-12-24 RX ORDER — INSULIN DETEMIR 100 [IU]/ML
12 INJECTION, SOLUTION SUBCUTANEOUS AT BEDTIME
Qty: 15 ML | Refills: 1
Start: 2021-12-24 | End: 2021-12-27

## 2021-12-24 NOTE — PROGRESS NOTES
"PHONE Diabetes Follow-up    Subjective/Objective:    Jacob Cai shares blood glucose log for review. Last date of communication was: 12/03. Assisted by Ana María  #[id number]    Diabetes is being managed with   Diabetes Medications   Diabetes Medication(s)     Biguanides       metFORMIN (GLUCOPHAGE-XR) 500 MG 24 hr tablet    TAKE 2 TABLETS (1,000 MG) BY MOUTH 2 TIMES DAILY (WITH MEALS)    Insulin       LEVEMIR FLEXTOUCH 100 UNIT/ML pen    INJECT 10 UNITS SUBCUTANEOUS AT BEDTIME    Sulfonylureas       glipiZIDE (GLUCOTROL XL) 10 MG 24 hr tablet    TAKE 2 TABLETS (20 MG) BY MOUTH DAILY    Incretin Mimetic Agents (GLP-1 Receptor Agonists)       VICTOZA PEN 18 MG/3ML soln    INJECT 1.8 MG SUBCUTANEOUS DAILY          BG/Food Log:   She is checking only 3 days/week,.  ~ requested last visit that she check once/day before or 2 hours after meals  When asked for her numbers today she says \"160, 170 or even had a 200\", asked if she has exact numbers written down so I can adjust medication.    Phone connection was lost, we tried her back but got voice mail.  Tried back 10 minutes later and reached her.  ~ explained it is necessary to have some specific numbers and when they are from so I can safely adjust medicine    She is able, with prompts, to get to memory and look back at results  She reports numbers from meter memory: 188, 238, 147, 206, 231, 214, 214, 215  ~ says the lower numbers were morning, that she is usually higher in the evening    Date Breakfast     Before After   12/25  155       Assessment:  No numbers in target pre- or post-meal.     Plan/Response:  Recommend increase to insulin - from 10 -> 12 units starting tonight    Asked please check a number on Sat, Sun, and Mon and write them down, I will call Monday and if still high we will increase insulin again.     Says \"blood pressure goes low when I'm not eating\", \"so low that I'm not collapsing, but falling down\" asked how she knows this is BP not " "BG- she says she is checking BP= \"it's like 84 over 45\" has happened 2 or 3 times.     Messaged provider she is seeing on 1/17/22 alerting to low BP.      Damaris Chavarria RD, LD, [facility]  Time spent 38 minutes    Any diabetes medication dose changes were made via the CDE Protocol and Collaborative Practice Agreement with the patient's referring provider.     "

## 2021-12-27 ENCOUNTER — MEDICAL CORRESPONDENCE (OUTPATIENT)
Dept: HEALTH INFORMATION MANAGEMENT | Facility: CLINIC | Age: 61
End: 2021-12-27

## 2021-12-27 ENCOUNTER — VIRTUAL VISIT (OUTPATIENT)
Dept: EDUCATION SERVICES | Facility: CLINIC | Age: 61
End: 2021-12-27
Payer: MEDICARE

## 2021-12-27 ENCOUNTER — TELEPHONE (OUTPATIENT)
Dept: FAMILY MEDICINE | Facility: CLINIC | Age: 61
End: 2021-12-27

## 2021-12-27 DIAGNOSIS — Z79.4 TYPE 2 DIABETES MELLITUS TREATED WITH INSULIN (H): Primary | ICD-10-CM

## 2021-12-27 DIAGNOSIS — E11.9 TYPE 2 DIABETES MELLITUS TREATED WITH INSULIN (H): Primary | ICD-10-CM

## 2021-12-27 DIAGNOSIS — E11.9 TYPE 2 DIABETES MELLITUS WITHOUT COMPLICATION, WITHOUT LONG-TERM CURRENT USE OF INSULIN (H): ICD-10-CM

## 2021-12-27 PROCEDURE — 98967 PH1 ASSMT&MGMT NQHP 11-20: CPT | Performed by: DIETITIAN, REGISTERED

## 2021-12-27 RX ORDER — INSULIN DETEMIR 100 [IU]/ML
INJECTION, SOLUTION SUBCUTANEOUS
Qty: 15 ML | Refills: 1 | Status: SHIPPED | OUTPATIENT
Start: 2021-12-27 | End: 2022-01-03

## 2021-12-27 NOTE — PROGRESS NOTES
Diabetes Follow-up    Subjective/Objective:    Jeramieveto Trell sent in blood glucose log for review. Last date of communication was: 12/24.    Diabetes is being managed with   Diabetes Medications   Diabetes Medication(s)     Biguanides       metFORMIN (GLUCOPHAGE-XR) 500 MG 24 hr tablet    TAKE 2 TABLETS (1,000 MG) BY MOUTH 2 TIMES DAILY (WITH MEALS)    Insulin       insulin detemir (LEVEMIR FLEXTOUCH) 100 UNIT/ML pen    Inject 12 Units Subcutaneous At Bedtime    Sulfonylureas       glipiZIDE (GLUCOTROL XL) 10 MG 24 hr tablet    TAKE 2 TABLETS (20 MG) BY MOUTH DAILY    Incretin Mimetic Agents (GLP-1 Receptor Agonists)       VICTOZA PEN 18 MG/3ML soln    INJECT 1.8 MG SUBCUTANEOUS DAILY          BG/Food Log: after changed to 12 units Levemir at HS  Saturday= 149  Sunday= 156  Monday= 157    Assessment:  Fasting blood glucose: 0% in target.    Plan/Response:  Recommend increase to insulin - Levemir from 12 -> 14 units  ~ advised if still over 130 in AM by Thurs or Friday, ok to increase to 16 units    ~ she expressed that it's hard to manage everything, if she eats too much, BG goes high and blood pressure goes too low    Damaris Chavarria RD, LD, [facility]  Time spent 20 min    Any diabetes medication dose changes were made via the CDE Protocol and Collaborative Practice Agreement with the patient's referring provider.

## 2021-12-27 NOTE — TELEPHONE ENCOUNTER
"Clara Ambriz PA-C  P Be Cma/Lpn Pool  Please call the patient and have her decrease her amlodipine to half tab daily. Continue monitoring blood pressure at home, keep upcoming appt.   Clara             Previous Messages       ----- Message -----   From: Damaris Chavarria RD   Sent: 12/24/2021  11:39 AM CST   To: Clara Ambriz PA-C   Subject: Update on low blood pressure                     Hi,   I just spoke to Jeramiehisander about her diabetes and she mentions her Blood Pressure is running low.   Says \"not collapsing, but falling down\".   I asked how she is sure it's blood pressure, not blood glucose and she says she checks BP at home, it has been \"84 over 45\".   She is scheduled with you 1/17 - just wanted to let you know in case you would like her to discontinue med until she sees you??   Thanks! Damaris Chavarria RD, LD, [facility]     "

## 2021-12-27 NOTE — LETTER
12/27/2021         RE: Jacob Cai  [address]  [place] [address]        Dear Colleague,    Thank you for referring your patient, Jacob Cai, to the [facility]. Please see a copy of my visit note below.    Diabetes Follow-up    Subjective/Objective:    Jacob Cai sent in blood glucose log for review. Last date of communication was: 12/24.    Diabetes is being managed with   Diabetes Medications   Diabetes Medication(s)     Biguanides       metFORMIN (GLUCOPHAGE-XR) 500 MG 24 hr tablet    TAKE 2 TABLETS (1,000 MG) BY MOUTH 2 TIMES DAILY (WITH MEALS)    Insulin       insulin detemir (LEVEMIR FLEXTOUCH) 100 UNIT/ML pen    Inject 12 Units Subcutaneous At Bedtime    Sulfonylureas       glipiZIDE (GLUCOTROL XL) 10 MG 24 hr tablet    TAKE 2 TABLETS (20 MG) BY MOUTH DAILY    Incretin Mimetic Agents (GLP-1 Receptor Agonists)       VICTOZA PEN 18 MG/3ML soln    INJECT 1.8 MG SUBCUTANEOUS DAILY          BG/Food Log: after changed to 12 units Levemir at HS  Saturday= 149  Sunday= 156  Monday= 157    Assessment:  Fasting blood glucose: 0% in target.    Plan/Response:  Recommend increase to insulin - Levemir from 12 -> 14 units  ~ advised if still over 130 in AM by Thurs or Friday, ok to increase to 16 units    ~ she expressed that it's hard to manage everything, if she eats too much, BG goes high and blood pressure goes too low    Damaris Chavarria RD, LD, [facility]  Time spent 20 min    Any diabetes medication dose changes were made via the CDE Protocol and Collaborative Practice Agreement with the patient's referring provider.

## 2022-01-01 ENCOUNTER — TRANSFERRED RECORDS (OUTPATIENT)
Dept: MULTI SPECIALTY CLINIC | Facility: CLINIC | Age: 62
End: 2022-01-01

## 2022-01-01 LAB — RETINOPATHY: NORMAL

## 2022-01-03 ENCOUNTER — MEDICAL CORRESPONDENCE (OUTPATIENT)
Dept: HEALTH INFORMATION MANAGEMENT | Facility: CLINIC | Age: 62
End: 2022-01-03

## 2022-01-03 ENCOUNTER — VIRTUAL VISIT (OUTPATIENT)
Dept: EDUCATION SERVICES | Facility: CLINIC | Age: 62
End: 2022-01-03
Payer: MEDICARE

## 2022-01-03 DIAGNOSIS — E11.9 TYPE 2 DIABETES MELLITUS WITHOUT COMPLICATION, WITHOUT LONG-TERM CURRENT USE OF INSULIN (H): Primary | ICD-10-CM

## 2022-01-03 PROCEDURE — 98966 PH1 ASSMT&MGMT NQHP 5-10: CPT | Performed by: DIETITIAN, REGISTERED

## 2022-01-03 RX ORDER — INSULIN DETEMIR 100 [IU]/ML
14 INJECTION, SOLUTION SUBCUTANEOUS AT BEDTIME
Qty: 15 ML | Refills: 2 | Status: SHIPPED | OUTPATIENT
Start: 2022-01-03 | End: 2022-05-27

## 2022-01-03 NOTE — PROGRESS NOTES
Phone appt to follow up on BG after insulin increase.   Assisted by Ana María  [phone]    We are titrating insulin:  Taking diabetes medications?   yes:     Diabetes Medication(s)     Biguanides       metFORMIN (GLUCOPHAGE-XR) 500 MG 24 hr tablet    TAKE 2 TABLETS (1,000 MG) BY MOUTH 2 TIMES DAILY (WITH MEALS)    Insulin       insulin detemir (LEVEMIR FLEXTOUCH) 100 UNIT/ML pen    Inject up to 20 units every night    Sulfonylureas       glipiZIDE (GLUCOTROL XL) 10 MG 24 hr tablet    TAKE 2 TABLETS (20 MG) BY MOUTH DAILY    Incretin Mimetic Agents (GLP-1 Receptor Agonists)       VICTOZA PEN 18 MG/3ML soln    INJECT 1.8 MG SUBCUTANEOUS DAILY        She has taken 14 units past 3 nights, 66% fasting BG in target.     Fasting BG:  Today = 129  Sunday = 96  Saturday= 142    Continue 14 units going forward.     Confirmed she got message to cut the blood pressure pill in half.     She will see PCP on 1/17.     Damaris Chavarria RD, LD, [facility]  Time spent 7 minutes

## 2022-01-03 NOTE — LETTER
1/3/2022         RE: Jacob Cai  [address] [facility] [address]        Dear Colleague,    Thank you for referring your patient, Jacob Cai, to the [facility]. Please see a copy of my visit note below.    Phone appt to follow up on BG after insulin increase.     We are titrating insulin:  Taking diabetes medications?   yes:     Diabetes Medication(s)     Biguanides       metFORMIN (GLUCOPHAGE-XR) 500 MG 24 hr tablet    TAKE 2 TABLETS (1,000 MG) BY MOUTH 2 TIMES DAILY (WITH MEALS)    Insulin       insulin detemir (LEVEMIR FLEXTOUCH) 100 UNIT/ML pen    Inject up to 20 units every night    Sulfonylureas       glipiZIDE (GLUCOTROL XL) 10 MG 24 hr tablet    TAKE 2 TABLETS (20 MG) BY MOUTH DAILY    Incretin Mimetic Agents (GLP-1 Receptor Agonists)       VICTOZA PEN 18 MG/3ML soln    INJECT 1.8 MG SUBCUTANEOUS DAILY        She has taken 14 units past 3 nights, 66% fasting BG in target.     Fasting BG:  Today = 129  Sunday = 96  Saturday= 142    Continue 14 units going forward.     Confirmed she got message to cut the blood pressure pill in half.     She will see PCP on 1/17.     Damaris Chavarria RD, LD, [facility]

## 2022-01-12 NOTE — PROGRESS NOTES
"  Assessment & Plan     1. Type 2 diabetes mellitus without complication, without long-term current use of insulin (H)    2. Benign essential hypertension with target blood pressure below 140/90    3. Hyperlipidemia LDL goal <100    4. Colon cancer screening        1) A1c is improved and back to goal. Continue PO meds and insulin, no changes today. Repeat A1c in 6 months.    2) blood pressure at goal on recheck. No med changes today    3) Lipids in process      Ordering of each unique test  Prescription drug management         Tobacco Cessation:   reports that she has been smoking cigarettes. She has never used smokeless tobacco.    BMI:   Estimated body mass index is 40.44 kg/m  as calculated from the following:    Height as of this encounter: 1.626 m (5' 4\").    Weight as of this encounter: 106.9 kg (235 lb 9.6 oz).     Depression Screening Follow Up    PHQ 2/7/2022   PHQ-9 Total Score 17   Q9: Thoughts of better off dead/self-harm past 2 weeks More than half the days       Return in about 6 months (around 8/7/2022) for diabetes follow up, a repeat A1c.    Clara Ambriz PA-C  [facility]    I attest that I was present during the patient visit, have verified the HPI, and performed an examination today.      Atul James is a 61 year old who presents for the following health issues     HPI     Diabetes Follow-up      How often are you checking your blood sugar? Not at all x the last 3 weeks, is completely out of lancets    What concerns do you have today about your diabetes? None     Do you have any of these symptoms? (Select all that apply)  Excessive thirst    Started insulin at last visit              Hyperlipidemia Follow-Up      Are you regularly taking any medication or supplement to lower your cholesterol?   Yes- simvastatin (ZOCOR) 20 MG tablet    Are you having muscle aches or other side effects that you think could be caused by your cholesterol lowering medication?  " "No    Hypertension Follow-up      Do you check your blood pressure regularly outside of the clinic? Yes     Are you following a low salt diet? Yes    Are your blood pressures ever more than 140 on the top number (systolic) OR more   than 90 on the bottom number (diastolic), for example 140/90? No    BP Readings from Last 2 Encounters:   02/07/22 135/68   09/14/21 121/68     Hemoglobin A1C POCT (%)   Date Value   06/14/2021 8.1 (H)   02/16/2021 8.5 (H)     Hemoglobin A1C (%)   Date Value   02/07/2022 7.4 (H)   09/14/2021 9.0 (H)     LDL Cholesterol Calculated (mg/dL)   Date Value   02/16/2021 110 (H)   01/28/2020 83         How many servings of fruits and vegetables do you eat daily?  0-1    On average, how many sweetened beverages do you drink each day (Examples: soda, juice, sweet tea, etc.  Do NOT count diet or artificially sweetened beverages)?   0    How many days per week do you exercise enough to make your heart beat faster? 3 or less    How many minutes a day do you exercise enough to make your heart beat faster? 9 or less    How many days per week do you miss taking your medication? 0        Review of Systems   Constitutional, cardiovascular, endocrine, neuro, skin, and psych systems are negative, except as otherwise noted.      Objective    /68   Pulse 71   Temp 98.4  F (36.9  C) (Tympanic)   Resp 20   Ht 1.626 m (5' 4\")   Wt 106.9 kg (235 lb 9.6 oz)   SpO2 94%   BMI 40.44 kg/m    Body mass index is 40.44 kg/m .  Physical Exam   GENERAL: healthy, alert and no distress  RESP: lungs clear to auscultation - no rales, rhonchi or wheezes  CV: regular rates and rhythm, normal S1 S2, no S3 or S4 and no murmur, click or rub  MS: no gross musculoskeletal defects noted, no edema  SKIN: no suspicious lesions or rashes  NEURO: Normal strength and tone, mentation intact and speech normal  PSYCH: mentation appears normal, affect normal/bright  Diabetic foot exam: normal DP and PT pulses, no trophic changes " or ulcerative lesions, normal sensory exam, normal monofilament exam and trace edema of feet and lower legs    Results for orders placed or performed in visit on 02/07/22 (from the past 24 hour(s))   Hemoglobin A1c   Result Value Ref Range    Hemoglobin A1C 7.4 (H) 0.0 - 5.6 %   Extra Tube    Narrative    The following orders were created for panel order Extra Tube.  Procedure                               Abnormality         Status                     ---------                               -----------         ------                     Extra Red Top Tube[[id number]]                               In process                 Extra Green Top (Lithium...[[id number]]                      In process                 Extra Purple Top Tube[[id number]]                            In process                   Please view results for these tests on the individual orders.       ----- Services Performed by a MEDICAL STUDENT in Presence of ATTENDING Physician-------

## 2022-01-24 DIAGNOSIS — E11.9 TYPE 2 DIABETES MELLITUS WITHOUT COMPLICATION, WITHOUT LONG-TERM CURRENT USE OF INSULIN (H): ICD-10-CM

## 2022-01-26 RX ORDER — PEN NEEDLE, DIABETIC 29 G X1/2"
NEEDLE, DISPOSABLE MISCELLANEOUS
Qty: 100 EACH | Refills: 0 | Status: SHIPPED | OUTPATIENT
Start: 2022-01-26 | End: 2022-07-26

## 2022-01-26 NOTE — TELEPHONE ENCOUNTER
"Requested Prescriptions   Pending Prescriptions Disp Refills     ULTICARE PEN NEEDLES 31G X 5 MM miscellaneous [Pharmacy Med Name: ULTICARE PEN NEEDLE 66OH2UR 31G X 5 MM Miscellaneous] 100 each 0     Sig: USE PEN NEEDLES DAILY OR AS DIRECTED.       Diabetic Supplies Protocol Passed - 1/24/2022  3:15 PM        Passed - Medication is active on med list        Passed - Patient is 18 years of age or older        Passed - Recent (6 mo) or future (30 days) visit within the authorizing provider's specialty     Patient had office visit in the last 6 months or has a visit in the next 30 days with authorizing provider.  See \"Patient Info\" tab in inbasket, or \"Choose Columns\" in Meds & Orders section of the refill encounter.               Prescription approved per [facility] Refill Protocol.  Marylu RN,BSN  Triage Nurse  [facility]: [facility]  Ph: [phone]      "

## 2022-02-07 ENCOUNTER — OFFICE VISIT (OUTPATIENT)
Dept: FAMILY MEDICINE | Facility: CLINIC | Age: 62
End: 2022-02-07
Payer: MEDICARE

## 2022-02-07 VITALS
SYSTOLIC BLOOD PRESSURE: 135 MMHG | DIASTOLIC BLOOD PRESSURE: 68 MMHG | RESPIRATION RATE: 20 BRPM | OXYGEN SATURATION: 94 % | HEIGHT: 64 IN | WEIGHT: 235.6 LBS | TEMPERATURE: 98.4 F | HEART RATE: 71 BPM | BODY MASS INDEX: 40.22 KG/M2

## 2022-02-07 DIAGNOSIS — E11.9 TYPE 2 DIABETES MELLITUS WITHOUT COMPLICATION, WITHOUT LONG-TERM CURRENT USE OF INSULIN (H): Primary | ICD-10-CM

## 2022-02-07 DIAGNOSIS — E78.5 HYPERLIPIDEMIA LDL GOAL <100: ICD-10-CM

## 2022-02-07 DIAGNOSIS — Z12.11 COLON CANCER SCREENING: ICD-10-CM

## 2022-02-07 DIAGNOSIS — I10 BENIGN ESSENTIAL HYPERTENSION WITH TARGET BLOOD PRESSURE BELOW 140/90: ICD-10-CM

## 2022-02-07 LAB
ANION GAP SERPL CALCULATED.3IONS-SCNC: 8 MMOL/L (ref 3–14)
BUN SERPL-MCNC: 14 MG/DL (ref 7–30)
CALCIUM SERPL-MCNC: 9.2 MG/DL (ref 8.5–10.1)
CHLORIDE BLD-SCNC: 108 MMOL/L (ref 94–109)
CHOLEST SERPL-MCNC: 184 MG/DL
CO2 SERPL-SCNC: 24 MMOL/L (ref 20–32)
CREAT SERPL-MCNC: 0.44 MG/DL (ref 0.52–1.04)
GFR SERPL CREATININE-BSD FRML MDRD: >90 ML/MIN/1.73M2
GLUCOSE BLD-MCNC: 142 MG/DL (ref 70–99)
HBA1C MFR BLD: 7.4 % (ref 0–5.6)
HDLC SERPL-MCNC: 30 MG/DL
HOLD SPECIMEN: NORMAL
LDLC SERPL CALC-MCNC: 105 MG/DL
NONHDLC SERPL-MCNC: 154 MG/DL
POTASSIUM BLD-SCNC: 3.8 MMOL/L (ref 3.4–5.3)
SODIUM SERPL-SCNC: 140 MMOL/L (ref 133–144)
TRIGL SERPL-MCNC: 245 MG/DL

## 2022-02-07 PROCEDURE — 36415 COLL VENOUS BLD VENIPUNCTURE: CPT | Performed by: PHYSICIAN ASSISTANT

## 2022-02-07 PROCEDURE — 80061 LIPID PANEL: CPT | Performed by: PHYSICIAN ASSISTANT

## 2022-02-07 PROCEDURE — 80048 BASIC METABOLIC PNL TOTAL CA: CPT | Performed by: PHYSICIAN ASSISTANT

## 2022-02-07 PROCEDURE — 99214 OFFICE O/P EST MOD 30 MIN: CPT | Performed by: PHYSICIAN ASSISTANT

## 2022-02-07 PROCEDURE — 99207 PR FOOT EXAM NO CHARGE: CPT | Performed by: PHYSICIAN ASSISTANT

## 2022-02-07 PROCEDURE — 83036 HEMOGLOBIN GLYCOSYLATED A1C: CPT | Performed by: PHYSICIAN ASSISTANT

## 2022-02-07 RX ORDER — AMLODIPINE BESYLATE 10 MG/1
10 TABLET ORAL DAILY
Qty: 90 TABLET | Refills: 3 | Status: SHIPPED | OUTPATIENT
Start: 2022-02-07 | End: 2023-01-20

## 2022-02-07 RX ORDER — LIRAGLUTIDE 6 MG/ML
1.8 INJECTION SUBCUTANEOUS DAILY
Qty: 27 ML | Refills: 1 | Status: SHIPPED | OUTPATIENT
Start: 2022-02-07 | End: 2022-09-26

## 2022-02-07 RX ORDER — GLIPIZIDE 10 MG/1
20 TABLET, FILM COATED, EXTENDED RELEASE ORAL DAILY
Qty: 180 TABLET | Refills: 1 | Status: SHIPPED | OUTPATIENT
Start: 2022-02-07 | End: 2022-09-26

## 2022-02-07 RX ORDER — HYDROCHLOROTHIAZIDE 25 MG/1
25 TABLET ORAL DAILY
Qty: 90 TABLET | Refills: 3 | Status: SHIPPED | OUTPATIENT
Start: 2022-02-07 | End: 2022-09-26

## 2022-02-07 RX ORDER — METFORMIN HCL 500 MG
1000 TABLET, EXTENDED RELEASE 24 HR ORAL 2 TIMES DAILY WITH MEALS
Qty: 360 TABLET | Refills: 1 | Status: SHIPPED | OUTPATIENT
Start: 2022-02-07 | End: 2022-09-26

## 2022-02-07 RX ORDER — METOPROLOL SUCCINATE 100 MG/1
100 TABLET, EXTENDED RELEASE ORAL DAILY
Qty: 90 TABLET | Refills: 3 | Status: SHIPPED | OUTPATIENT
Start: 2022-02-07 | End: 2022-09-26

## 2022-02-07 RX ORDER — SIMVASTATIN 20 MG
20 TABLET ORAL AT BEDTIME
Qty: 90 TABLET | Refills: 3 | Status: SHIPPED | OUTPATIENT
Start: 2022-02-07 | End: 2022-09-26

## 2022-02-07 RX ORDER — SERTRALINE HYDROCHLORIDE 100 MG/1
100 TABLET, FILM COATED ORAL 2 TIMES DAILY
COMMUNITY
Start: 2021-12-09 | End: 2022-09-26

## 2022-02-07 RX ORDER — LISINOPRIL 40 MG/1
40 TABLET ORAL DAILY
Qty: 90 TABLET | Refills: 3 | Status: SHIPPED | OUTPATIENT
Start: 2022-02-07 | End: 2022-09-26

## 2022-02-07 ASSESSMENT — ANXIETY QUESTIONNAIRES
1. FEELING NERVOUS, ANXIOUS, OR ON EDGE: MORE THAN HALF THE DAYS
2. NOT BEING ABLE TO STOP OR CONTROL WORRYING: MORE THAN HALF THE DAYS
7. FEELING AFRAID AS IF SOMETHING AWFUL MIGHT HAPPEN: NEARLY EVERY DAY
GAD7 TOTAL SCORE: 15
3. WORRYING TOO MUCH ABOUT DIFFERENT THINGS: NEARLY EVERY DAY
6. BECOMING EASILY ANNOYED OR IRRITABLE: SEVERAL DAYS
5. BEING SO RESTLESS THAT IT IS HARD TO SIT STILL: MORE THAN HALF THE DAYS

## 2022-02-07 ASSESSMENT — MIFFLIN-ST. JEOR: SCORE: 1628.67

## 2022-02-07 ASSESSMENT — PATIENT HEALTH QUESTIONNAIRE - PHQ9
SUM OF ALL RESPONSES TO PHQ QUESTIONS 1-9: 17
5. POOR APPETITE OR OVEREATING: MORE THAN HALF THE DAYS

## 2022-02-07 NOTE — PATIENT INSTRUCTIONS
Ask your insurance if they cover colon cancer screening. Either with a colonoscopy or FIT test.   If so, let Clara know which one.

## 2022-02-08 ASSESSMENT — ANXIETY QUESTIONNAIRES: GAD7 TOTAL SCORE: 15

## 2022-07-13 ENCOUNTER — ANCILLARY PROCEDURE (OUTPATIENT)
Dept: MAMMOGRAPHY | Facility: CLINIC | Age: 62
End: 2022-07-13
Payer: MEDICARE

## 2022-07-13 DIAGNOSIS — Z12.31 VISIT FOR SCREENING MAMMOGRAM: ICD-10-CM

## 2022-07-13 PROCEDURE — 77067 SCR MAMMO BI INCL CAD: CPT | Mod: TC | Performed by: RADIOLOGY

## 2022-09-21 DIAGNOSIS — E11.9 TYPE 2 DIABETES MELLITUS WITHOUT COMPLICATION, WITHOUT LONG-TERM CURRENT USE OF INSULIN (H): ICD-10-CM

## 2022-09-26 ENCOUNTER — OFFICE VISIT (OUTPATIENT)
Dept: FAMILY MEDICINE | Facility: CLINIC | Age: 62
End: 2022-09-26
Payer: MEDICARE

## 2022-09-26 VITALS
RESPIRATION RATE: 16 BRPM | TEMPERATURE: 98.4 F | OXYGEN SATURATION: 93 % | SYSTOLIC BLOOD PRESSURE: 130 MMHG | DIASTOLIC BLOOD PRESSURE: 68 MMHG | WEIGHT: 236 LBS | BODY MASS INDEX: 40.51 KG/M2 | HEART RATE: 79 BPM

## 2022-09-26 DIAGNOSIS — I10 BENIGN ESSENTIAL HYPERTENSION WITH TARGET BLOOD PRESSURE BELOW 140/90: Primary | ICD-10-CM

## 2022-09-26 DIAGNOSIS — F32.0 MILD MAJOR DEPRESSION (H): ICD-10-CM

## 2022-09-26 DIAGNOSIS — E78.5 HYPERLIPIDEMIA LDL GOAL <100: ICD-10-CM

## 2022-09-26 DIAGNOSIS — E11.9 TYPE 2 DIABETES MELLITUS WITHOUT COMPLICATION, WITHOUT LONG-TERM CURRENT USE OF INSULIN (H): ICD-10-CM

## 2022-09-26 DIAGNOSIS — Z79.4 TYPE 2 DIABETES MELLITUS TREATED WITH INSULIN (H): ICD-10-CM

## 2022-09-26 DIAGNOSIS — E11.9 TYPE 2 DIABETES MELLITUS TREATED WITH INSULIN (H): ICD-10-CM

## 2022-09-26 LAB — HBA1C MFR BLD: 8 % (ref 0–5.6)

## 2022-09-26 PROCEDURE — 83036 HEMOGLOBIN GLYCOSYLATED A1C: CPT | Performed by: PHYSICIAN ASSISTANT

## 2022-09-26 PROCEDURE — 82043 UR ALBUMIN QUANTITATIVE: CPT | Performed by: PHYSICIAN ASSISTANT

## 2022-09-26 PROCEDURE — 36415 COLL VENOUS BLD VENIPUNCTURE: CPT | Performed by: PHYSICIAN ASSISTANT

## 2022-09-26 PROCEDURE — 99215 OFFICE O/P EST HI 40 MIN: CPT | Performed by: PHYSICIAN ASSISTANT

## 2022-09-26 PROCEDURE — 96127 BRIEF EMOTIONAL/BEHAV ASSMT: CPT | Performed by: PHYSICIAN ASSISTANT

## 2022-09-26 RX ORDER — LANCETS 33 GAUGE
1 EACH MISCELLANEOUS 2 TIMES DAILY
Qty: 100 EACH | Refills: 4 | Status: SHIPPED | OUTPATIENT
Start: 2022-09-26 | End: 2022-10-19

## 2022-09-26 RX ORDER — SIMVASTATIN 20 MG
20 TABLET ORAL AT BEDTIME
Qty: 90 TABLET | Refills: 1 | Status: SHIPPED | OUTPATIENT
Start: 2022-09-26 | End: 2023-08-01

## 2022-09-26 RX ORDER — GLIPIZIDE 10 MG/1
20 TABLET, FILM COATED, EXTENDED RELEASE ORAL DAILY
Qty: 180 TABLET | Refills: 1 | Status: SHIPPED | OUTPATIENT
Start: 2022-09-26 | End: 2023-04-11

## 2022-09-26 RX ORDER — DULOXETIN HYDROCHLORIDE 60 MG/1
60 CAPSULE, DELAYED RELEASE ORAL DAILY
Qty: 90 CAPSULE | Refills: 1 | Status: SHIPPED | OUTPATIENT
Start: 2022-09-26 | End: 2023-11-09

## 2022-09-26 RX ORDER — BLOOD SUGAR DIAGNOSTIC
STRIP MISCELLANEOUS
Qty: 50 STRIP | Refills: 11 | Status: SHIPPED | OUTPATIENT
Start: 2022-09-26 | End: 2022-10-19

## 2022-09-26 RX ORDER — METFORMIN HCL 500 MG
1000 TABLET, EXTENDED RELEASE 24 HR ORAL 2 TIMES DAILY WITH MEALS
Qty: 360 TABLET | Refills: 1 | Status: SHIPPED | OUTPATIENT
Start: 2022-09-26 | End: 2023-04-11

## 2022-09-26 RX ORDER — INSULIN DETEMIR 100 [IU]/ML
10 INJECTION, SOLUTION SUBCUTANEOUS AT BEDTIME
Qty: 15 ML | Refills: 0 | Status: SHIPPED | OUTPATIENT
Start: 2022-09-26 | End: 2022-09-26

## 2022-09-26 RX ORDER — LISINOPRIL 40 MG/1
40 TABLET ORAL DAILY
Qty: 90 TABLET | Refills: 1 | Status: SHIPPED | OUTPATIENT
Start: 2022-09-26 | End: 2023-10-18

## 2022-09-26 RX ORDER — SERTRALINE HYDROCHLORIDE 100 MG/1
200 TABLET, FILM COATED ORAL DAILY
Qty: 180 TABLET | Refills: 1 | Status: SHIPPED | OUTPATIENT
Start: 2022-09-26 | End: 2023-04-11

## 2022-09-26 RX ORDER — INSULIN DETEMIR 100 [IU]/ML
10 INJECTION, SOLUTION SUBCUTANEOUS AT BEDTIME
Qty: 15 ML | Refills: 0 | Status: SHIPPED | OUTPATIENT
Start: 2022-09-26 | End: 2022-10-19

## 2022-09-26 RX ORDER — METOPROLOL SUCCINATE 100 MG/1
100 TABLET, EXTENDED RELEASE ORAL DAILY
Qty: 90 TABLET | Refills: 1 | Status: SHIPPED | OUTPATIENT
Start: 2022-09-26 | End: 2023-04-11

## 2022-09-26 RX ORDER — PEN NEEDLE, DIABETIC 29 G X1/2"
NEEDLE, DISPOSABLE MISCELLANEOUS
Qty: 100 EACH | Refills: 0 | Status: SHIPPED | OUTPATIENT
Start: 2022-09-26 | End: 2023-02-03

## 2022-09-26 RX ORDER — LIRAGLUTIDE 6 MG/ML
1.8 INJECTION SUBCUTANEOUS DAILY
Qty: 27 ML | Refills: 1 | Status: SHIPPED | OUTPATIENT
Start: 2022-09-26 | End: 2023-04-11

## 2022-09-26 RX ORDER — HYDROCHLOROTHIAZIDE 50 MG/1
50 TABLET ORAL DAILY
Qty: 90 TABLET | Refills: 1 | Status: SHIPPED | OUTPATIENT
Start: 2022-09-26 | End: 2023-04-11

## 2022-09-26 ASSESSMENT — PATIENT HEALTH QUESTIONNAIRE - PHQ9
SUM OF ALL RESPONSES TO PHQ QUESTIONS 1-9: 3
SUM OF ALL RESPONSES TO PHQ QUESTIONS 1-9: 3
10. IF YOU CHECKED OFF ANY PROBLEMS, HOW DIFFICULT HAVE THESE PROBLEMS MADE IT FOR YOU TO DO YOUR WORK, TAKE CARE OF THINGS AT HOME, OR GET ALONG WITH OTHER PEOPLE: NOT DIFFICULT AT ALL

## 2022-09-26 ASSESSMENT — PAIN SCALES - GENERAL: PAINLEVEL: MILD PAIN (3)

## 2022-09-26 NOTE — PROGRESS NOTES
Assessment & Plan   Problem List Items Addressed This Visit        Endocrine    Hyperlipidemia LDL goal <100    Relevant Medications    simvastatin (ZOCOR) 20 MG tablet    Type 2 diabetes mellitus without complication, without long-term current use of insulin (H)    Relevant Medications    glipiZIDE (GLUCOTROL XL) 10 MG 24 hr tablet    insulin detemir (LEVEMIR FLEXTOUCH) 100 UNIT/ML pen    liraglutide (VICTOZA PEN) 18 MG/3ML solution    metFORMIN (GLUCOPHAGE XR) 500 MG 24 hr tablet    ONETOUCH VERIO IQ test strip    insulin pen needle (ULTICARE PEN NEEDLES) 31G X 5 MM miscellaneous    Other Relevant Orders    HEMOGLOBIN A1C (Completed)       Circulatory    Benign essential hypertension with target blood pressure below 140/90 - Primary    Relevant Medications    hydrochlorothiazide (HYDRODIURIL) 50 MG tablet    lisinopril (ZESTRIL) 40 MG tablet    metoprolol succinate ER (TOPROL XL) 100 MG 24 hr tablet    Other Relevant Orders    Albumin Random Urine Quantitative with Creat Ratio      Other Visit Diagnoses     Mild major depression (H)        possible ptsd    Relevant Medications    sertraline (ZOLOFT) 100 MG tablet    DULoxetine (CYMBALTA) 60 MG capsule    Type 2 diabetes mellitus treated with insulin (H)        Relevant Medications    glipiZIDE (GLUCOTROL XL) 10 MG 24 hr tablet    insulin detemir (LEVEMIR FLEXTOUCH) 100 UNIT/ML pen    liraglutide (VICTOZA PEN) 18 MG/3ML solution    metFORMIN (GLUCOPHAGE XR) 500 MG 24 hr tablet    OneTouch Delica Lancets 33G MISC    ONETOUCH VERIO IQ test strip          Was recently in the ER with elevated bp and chest discomfort. Cardiac workup was reassuring. CT PE study showed possible pulmonary edema. Given she is mildly hypertensive at home on average and the CT findings, I will increase her hydrochlorothiazide to 50mg daily. A1C mildly elevated. Refilled other meds at current levels and discussed her care with BELIA Ambriz, her primary. Follow up with her on 10/19/22 for  "recheck, chem and discussion of need for CXR, echo given ER CT findings.    Complete history and physical exam as below. AF with normal VS.    DDx and Dx discussed with and explained to the pt to their satisfaction.  All questions were answered at this time. Pt expressed understanding of and agreement with this dx, tx, and plan. No further workup warranted and standard medication warnings given. I have given the patient a list of pertinent indications for re-evaluation. Will go to the Emergency Department if symptoms worsen or new concerning symptoms arise. Patient left in no apparent distress.     Ordering of each unique test  Prescription drug management  41 minutes spent on the date of the encounter doing chart review, history and exam, documentation and further activities per the note     Nicotine/Tobacco Cessation:  She reports that she has been smoking cigarettes. She has never used smokeless tobacco.  Nicotine/Tobacco Cessation Plan:     BMI:   Estimated body mass index is 40.51 kg/m  as calculated from the following:    Height as of 2/7/22: 1.626 m (5' 4\").    Weight as of this encounter: 107 kg (236 lb).     See Patient Instructions    Return in about 23 days (around 10/19/2022) for your already scheduled appointment, or call 911/go to an ER anytime if worsening.    JOSE Last  [facility] James is a 61 year old accompanied by her daughter, presenting for the following health issues. They declined a [place] .  Diabetes, Hospital F/U, and Depression    Daughter in the room to interpret  HPI     BP  On average at home she her BP is running mmHg 150s-160s/70s-80s    Diabetes Follow-up    How often are you checking your blood sugar? Two times daily  What time of day are you checking your blood sugars (select all that apply)?  After meals  Have you had any blood sugars above 200?  No  Have you had any blood sugars below 70?  No    What symptoms do you " notice when your blood sugar is low?  None    What concerns do you have today about your diabetes? None     Do you have any of these symptoms? (Select all that apply)  No numbness or tingling in feet.  No redness, sores or blisters on feet.  No complaints of excessive thirst.  No reports of blurry vision.  No significant changes to weight.    Have you had a diabetic eye exam in the last 12 months? Yes- Date of last eye exam: 2021,  Location: at a Pharmacy at [facility] [address]    Needs more strips    BP Readings from Last 2 Encounters:   09/26/22 130/68   02/07/22 135/68     Hemoglobin A1C (%)   Date Value   09/26/2022 8.0 (H)   02/07/2022 7.4 (H)   06/14/2021 8.1 (H)   02/16/2021 8.5 (H)     LDL Cholesterol Calculated (mg/dL)   Date Value   02/07/2022 105 (H)   02/16/2021 110 (H)   01/28/2020 83         Depression Followup    How are you doing with your depression since your last visit? No change    Are you having other symptoms that might be associated with depression? No    Have you had a significant life event?  No     Are you feeling anxious or having panic attacks?   No    Do you have any concerns with your use of alcohol or other drugs? No    Social History     Tobacco Use     Smoking status: Current Some Day Smoker     Types: Cigarettes     Smokeless tobacco: Never Used   Vaping Use     Vaping Use: Never used   Substance Use Topics     Alcohol use: No     Drug use: No     PHQ 4/2/2020 2/7/2022 9/26/2022   PHQ-9 Total Score 1 17 3   Q9: Thoughts of better off dead/self-harm past 2 weeks Not at all More than half the days Not at all     JOSH-7 SCORE 1/28/2020 4/2/2020 2/7/2022   Total Score - - -   Total Score 0 4 15     Last PHQ-9 9/26/2022   1.  Little interest or pleasure in doing things 0   2.  Feeling down, depressed, or hopeless 0   3.  Trouble falling or staying asleep, or sleeping too much 1   4.  Feeling tired or having little energy 2   5.  Poor appetite or overeating 0   6.  Feeling bad  about yourself 0   7.  Trouble concentrating 0   8.  Moving slowly or restless 0   Q9: Thoughts of better off dead/self-harm past 2 weeks 0   PHQ-9 Total Score 3   Difficulty at work, home, or with people -       Suicide Assessment Five-step Evaluation and Treatment (SAFE-T)    How many servings of fruits and vegetables do you eat daily?  2-3    On average, how many sweetened beverages do you drink each day (Examples: soda, juice, sweet tea, etc.  Do NOT count diet or artificially sweetened beverages)?   2    How many days per week do you exercise enough to make your heart beat faster? 7    How many minutes a day do you exercise enough to make your heart beat faster? 20 - 29    How many days per week do you miss taking your medication? 0      Hospital Follow-up Visit:    Hospital/Nursing Home/IP Rehab Facility: [facility]  Date of Admission: 9/23/22  Date of Discharge: 9/23/22  Reason(s) for Admission: BP was high    Was your hospitalization related to COVID-19? No   Problems taking medications regularly:  None  Medication changes since discharge: None  Problems adhering to non-medication therapy:  None    Summary of hospitalization:  CareEverywhere information obtained and reviewed  Diagnostic Tests/Treatments reviewed.  Follow up needed: with primary for continue bp management and addressing CT findings of potential CHF.  Other Healthcare Providers Involved in Patient s Care:         None  Update since discharge: improved.     Plan of care communicated with patient and family     Review of Systems   Constitutional, HEENT, cardiovascular, pulmonary, gi and gu systems are negative, except as otherwise noted.      Objective    /68   Pulse 79   Temp 98.4  F (36.9  C) (Tympanic)   Resp 16   Wt 107 kg (236 lb)   SpO2 93%   BMI 40.51 kg/m    Body mass index is 40.51 kg/m .  Physical Exam  Vitals and nursing note reviewed.   Constitutional:       General: She is not in acute distress.     Appearance: She  is not ill-appearing or diaphoretic.   HENT:      Head: Normocephalic and atraumatic.      Mouth/Throat:      Mouth: Mucous membranes are moist.   Eyes:      Conjunctiva/sclera: Conjunctivae normal.   Cardiovascular:      Rate and Rhythm: Normal rate and regular rhythm.      Heart sounds: Normal heart sounds. No murmur heard.    No friction rub. No gallop.   Pulmonary:      Effort: Pulmonary effort is normal. No respiratory distress.      Breath sounds: Normal breath sounds. No stridor. No wheezing, rhonchi or rales.   Abdominal:      General: Bowel sounds are normal. There is no distension.      Palpations: Abdomen is soft. There is no mass.      Tenderness: There is no abdominal tenderness. There is no guarding or rebound.      Hernia: No hernia is present.   Skin:     General: Skin is warm and dry.   Neurological:      General: No focal deficit present.      Mental Status: She is alert. Mental status is at baseline.   Psychiatric:         Mood and Affect: Mood normal.         Behavior: Behavior normal.          Results for orders placed or performed in visit on 09/26/22   HEMOGLOBIN A1C     Status: Abnormal   Result Value Ref Range    Hemoglobin A1C 8.0 (H) 0.0 - 5.6 %     Answers for HPI/ROS submitted by the patient on 9/26/2022  If you checked off any problems, how difficult have these problems made it for you to do your work, take care of things at home, or get along with other people?: Not difficult at all  PHQ9 TOTAL SCORE: 3

## 2022-09-26 NOTE — PATIENT INSTRUCTIONS
Colette James,    Thank you for allowing [facility] to manage your care.    I am unsure of the cause of your symptoms, but your exam is reassuring.   If you develop worsening/changing symptoms at any time, please call 911 or go to the emergency department for evaluation.    Take and record your blood pressure daily for 2 weeks. If your blood pressure is greater than or equal to 140/90mm Hg more than half of the time, please schedule an appointment with us for a recheck.    I ordered some lab work, please go to the laboratory to get your studies.    I sent your prescriptions to your pharmacy.    Please allow 1-2 business days for our office to contact you in regards to your laboratory/radiological studies.  If not done so, I encourage you to login into [employer] ([email or web address] to review your results as well.     Drink 8-10 glasses of fluid daily to stay well-hydrated.    If you have any questions or concerns, please feel free to call us at [phone]    Sincerely,    Julio Cesar Marcial PA-C    Did you know?      You can schedule a video visit for follow-up appointments as well as future appointments for certain conditions.  Please see the below link.     [email or web address]    If you have not already done so,  I encourage you to sign up for [employer] ([email or web address]).  This will allow you to review your results, securely communicate with a provider, and schedule virtual visits as well.

## 2022-09-27 ENCOUNTER — NURSE TRIAGE (OUTPATIENT)
Dept: NURSING | Facility: CLINIC | Age: 62
End: 2022-09-27

## 2022-09-27 LAB
CREAT UR-MCNC: 135 MG/DL
MICROALBUMIN UR-MCNC: 62 MG/L
MICROALBUMIN/CREAT UR: 45.93 MG/G CR (ref 0–25)

## 2022-09-27 NOTE — TELEPHONE ENCOUNTER
Nurse Triage SBAR    Situation: Results    Background: Daughter, Rashard, calling. Consent: Not on file. Obtained interpretor and contacted the patient.     Assessment: No provider comment on labs. Unable to give results. Patient was left a voicemail she did not understand.     Recommendation: Routing to clinic. Okay to leave a detailed voicemail on patients phone. She stated okay to contact her daughter Rashard.     Bernarda Perez, RN Nursing Advisor 9/27/2022 4:35 PM     Reason for Disposition    Caller requesting lab results  (Exception: Routine or non-urgent lab result.)    Additional Information    Negative: ED call to PCP (i.e., primary care provider; doctor, NP, or PA)    Negative: Doctor (or NP/PA) call to PCP    Negative: Call about patient who is currently hospitalized    Negative: Lab or radiology calling with CRITICAL test results    Negative: [1] Follow-up call from patient regarding patient's clinical status AND [2] information urgent    Negative: [1] Caller requests to speak ONLY to PCP AND [2] URGENT question    Negative: [1] Caller requests to speak to PCP now AND [2] won't tell us reason for call  (Exception: If 10 pm to 6 am, caller must first discuss reason for the call.)    Negative: Notification of hospital admission    Negative: Notification of death    Negative: Lab calling with strep throat test results and triager can call in prescription    Negative: Lab calling with urinalysis test results and triager can call in prescription    Negative: Medication questions    Negative: Medication renewal and refill questions    Negative: Pre-operative or pre-procedural questions    Protocols used: PCP CALL - [facility]

## 2022-09-28 ENCOUNTER — TELEPHONE (OUTPATIENT)
Dept: FAMILY MEDICINE | Facility: CLINIC | Age: 62
End: 2022-09-28

## 2022-09-28 NOTE — TELEPHONE ENCOUNTER
----- Message from JOSE Last sent at 9/28/2022 12:52 PM CDT -----  Team - please call patient with results. A1C is mildly elevated from previous and urine shows that there is some stress on the kidney from her bp and diabetes. She can have these addressed further by Clara at her already schedule appointment next month. Thanks.

## 2022-09-28 NOTE — TELEPHONE ENCOUNTER
With the assistance of a Yudithn , directives from Rach GARCIA reviewed with patient and she verbalized a good understanding.     Dahiana Hobson RN BSN  [facility]

## 2022-09-28 NOTE — RESULT ENCOUNTER NOTE
Team - please call patient with results. A1C is mildly elevated from previous and urine shows that there is some stress on the kidney from her bp and diabetes. She can have these addressed further by Clara at her already schedule appointment next month. Thanks.

## 2022-09-28 NOTE — TELEPHONE ENCOUNTER
See Nurse Triage Note on 9/27/22. Patient has already been notified of the directives from Rach Connolly PA-C.     Dahiana Hobson RN BSN  [facility]

## 2022-09-28 NOTE — TELEPHONE ENCOUNTER
Provider Response to 2nd Level Triage Request    I have reviewed the RN documentation. My recommendation is:  Clinic follow up next month as recommended/scheduled.     Lab work shows elevated urine creatinine meaning that blood pressure was not well controlled. Please continue with medication increase as recommended by Rolando Marcial PA-C.    A1C remains elevated at 8.0-continue with current medications as recommended.     Rach Connolly PA-C on 9/28/2022 at 7:53 AM

## 2022-09-28 NOTE — TELEPHONE ENCOUNTER
[facility]  [facility]. [facility].   Need to try again this afternoon.    Missing consent to communicate for patient's daughter.  Remind patient at upcoming appointment if would like daughter to have consent.  Elo Melgar RN

## 2022-09-28 NOTE — TELEPHONE ENCOUNTER
I called the  line, however after being on hold for 10 minutes, they were unable to connect [facility] .     The  line asked us to try back later.     Dahiana Hobson RN BSN  [facility]

## 2022-09-29 ENCOUNTER — TELEPHONE (OUTPATIENT)
Dept: FAMILY MEDICINE | Facility: CLINIC | Age: 62
End: 2022-09-29

## 2022-09-29 NOTE — TELEPHONE ENCOUNTER
Pharmacy calling to verify if patient is on both Duloxetine and sertraline.  Per med review and providers OV on 9/26/22 informed pharmacy yes patient is on both medications        OV 9/26/22  Other Visit Diagnoses      Mild major depression (H)         possible ptsd     Relevant Medications     sertraline (ZOLOFT) 100 MG tablet     DULoxetine (CYMBALTA) 60 MG capsule

## 2022-10-19 ENCOUNTER — CARE COORDINATION (OUTPATIENT)
Dept: CARDIOLOGY | Facility: CLINIC | Age: 62
End: 2022-10-19

## 2022-10-19 ENCOUNTER — OFFICE VISIT (OUTPATIENT)
Dept: FAMILY MEDICINE | Facility: CLINIC | Age: 62
End: 2022-10-19
Payer: MEDICARE

## 2022-10-19 VITALS
HEART RATE: 80 BPM | OXYGEN SATURATION: 92 % | BODY MASS INDEX: 39.14 KG/M2 | SYSTOLIC BLOOD PRESSURE: 122 MMHG | DIASTOLIC BLOOD PRESSURE: 68 MMHG | RESPIRATION RATE: 20 BRPM | WEIGHT: 228 LBS | TEMPERATURE: 97.5 F

## 2022-10-19 DIAGNOSIS — E11.9 TYPE 2 DIABETES MELLITUS WITHOUT COMPLICATION, WITHOUT LONG-TERM CURRENT USE OF INSULIN (H): ICD-10-CM

## 2022-10-19 DIAGNOSIS — I10 BENIGN ESSENTIAL HYPERTENSION WITH TARGET BLOOD PRESSURE BELOW 140/90: Primary | ICD-10-CM

## 2022-10-19 DIAGNOSIS — I10 BENIGN ESSENTIAL HYPERTENSION WITH TARGET BLOOD PRESSURE BELOW 140/90: ICD-10-CM

## 2022-10-19 DIAGNOSIS — R06.02 SHORTNESS OF BREATH: ICD-10-CM

## 2022-10-19 DIAGNOSIS — R07.89 CHEST DISCOMFORT: ICD-10-CM

## 2022-10-19 DIAGNOSIS — I50.9 CHRONIC CONGESTIVE HEART FAILURE, UNSPECIFIED HEART FAILURE TYPE (H): Primary | ICD-10-CM

## 2022-10-19 DIAGNOSIS — E66.01 MORBID OBESITY (H): ICD-10-CM

## 2022-10-19 LAB
ANION GAP SERPL CALCULATED.3IONS-SCNC: 6 MMOL/L (ref 3–14)
BUN SERPL-MCNC: 18 MG/DL (ref 7–30)
CALCIUM SERPL-MCNC: 9.6 MG/DL (ref 8.5–10.1)
CHLORIDE BLD-SCNC: 102 MMOL/L (ref 94–109)
CO2 SERPL-SCNC: 30 MMOL/L (ref 20–32)
CREAT SERPL-MCNC: 0.5 MG/DL (ref 0.52–1.04)
GFR SERPL CREATININE-BSD FRML MDRD: >90 ML/MIN/1.73M2
GLUCOSE BLD-MCNC: 125 MG/DL (ref 70–99)
POTASSIUM BLD-SCNC: 3.9 MMOL/L (ref 3.4–5.3)
SODIUM SERPL-SCNC: 138 MMOL/L (ref 133–144)

## 2022-10-19 PROCEDURE — 36415 COLL VENOUS BLD VENIPUNCTURE: CPT | Performed by: PHYSICIAN ASSISTANT

## 2022-10-19 PROCEDURE — 80048 BASIC METABOLIC PNL TOTAL CA: CPT | Performed by: PHYSICIAN ASSISTANT

## 2022-10-19 PROCEDURE — 99214 OFFICE O/P EST MOD 30 MIN: CPT | Performed by: PHYSICIAN ASSISTANT

## 2022-10-19 RX ORDER — INSULIN DETEMIR 100 [IU]/ML
15 INJECTION, SOLUTION SUBCUTANEOUS AT BEDTIME
Qty: 15 ML | Refills: 0 | Status: SHIPPED | OUTPATIENT
Start: 2022-10-19 | End: 2023-01-20

## 2022-10-19 RX ORDER — BLOOD SUGAR DIAGNOSTIC
STRIP MISCELLANEOUS
Qty: 50 STRIP | Refills: 11 | Status: SHIPPED | OUTPATIENT
Start: 2022-10-19 | End: 2023-01-20

## 2022-10-19 RX ORDER — LANCETS 33 GAUGE
1 EACH MISCELLANEOUS 2 TIMES DAILY
Qty: 100 EACH | Refills: 4 | Status: SHIPPED | OUTPATIENT
Start: 2022-10-19 | End: 2023-01-20

## 2022-10-19 ASSESSMENT — PATIENT HEALTH QUESTIONNAIRE - PHQ9
5. POOR APPETITE OR OVEREATING: NOT AT ALL
SUM OF ALL RESPONSES TO PHQ QUESTIONS 1-9: 12

## 2022-10-19 ASSESSMENT — ANXIETY QUESTIONNAIRES
3. WORRYING TOO MUCH ABOUT DIFFERENT THINGS: NEARLY EVERY DAY
1. FEELING NERVOUS, ANXIOUS, OR ON EDGE: SEVERAL DAYS
2. NOT BEING ABLE TO STOP OR CONTROL WORRYING: MORE THAN HALF THE DAYS

## 2022-10-19 ASSESSMENT — PAIN SCALES - GENERAL: PAINLEVEL: NO PAIN (0)

## 2022-10-19 NOTE — PATIENT INSTRUCTIONS
INCREASE YOUR INSULIN TO 15 UNITS AT BEDTIME.  SCHEDULE YOUR EYE EXAM.  SCHEDULE A CONSULT/VISIT WITH THE HEART SPECIALIST.

## 2022-10-19 NOTE — PROGRESS NOTES
"  Assessment & Plan       ICD-10-CM    1. Chronic congestive heart failure, unspecified heart failure type (H)  I50.9 Adult Cardiology Eval  Referral     Basic metabolic panel  (Ca, Cl, CO2, Creat, Gluc, K, Na, BUN)     Basic metabolic panel  (Ca, Cl, CO2, Creat, Gluc, K, Na, BUN)      2. Benign essential hypertension with target blood pressure below 140/90  I10 Adult Cardiology Eval  Referral      3. Type 2 diabetes mellitus without complication, without long-term current use of insulin (H)  E11.9 insulin detemir (LEVEMIR FLEXTOUCH) 100 UNIT/ML pen      4. Morbid obesity (H)  E66.01            1) Care Everywhere reviewed. Her hydrochlorothiazide was recently increase to 50mg daily. Recheck BMP today. Referral to cards for further eval/tx    2) blood pressure at goal today. No med changes    3) Recent A1c 8.0%. Increase insulin to 15 units nightly. Recheck A1c in 3 months.     4) Comorbid to above conditions     Patient Instructions   INCREASE YOUR INSULIN TO 15 UNITS AT BEDTIME.  SCHEDULE YOUR EYE EXAM.  SCHEDULE A CONSULT/VISIT WITH THE HEART SPECIALIST.      Ordering of each unique test  Prescription drug management         BMI:   Estimated body mass index is 39.14 kg/m  as calculated from the following:    Height as of 2/7/22: 1.626 m (5' 4\").    Weight as of this encounter: 103.4 kg (228 lb).     Return in about 3 months (around 1/19/2023) for diabetes follow up, a repeat A1c, with Clara, in person.    Clara Ambriz PA-C  [facility] CASA James is a 61 year old, presenting for the following health issues:  Diabetes      HPI     Diabetes Follow-up      How often are you checking your blood sugar? Not at all - had run put of test strips and lancets    What concerns do you have today about your diabetes? None     Do you have any of these symptoms? (Select all that apply)  No numbness or tingling in feet.  No redness, sores or blisters on feet.  No complaints " of excessive thirst.  No reports of blurry vision.  No significant changes to weight.    Have you had a diabetic eye exam in the last 12 months? Yes- Date of last eye exam: 2021,  Location: [facility] [address]    Using 10 units at night  Diab Ed had discussed increasing to 14 units      BP Readings from Last 2 Encounters:   10/19/22 122/68   09/26/22 130/68     Hemoglobin A1C (%)   Date Value   09/26/2022 8.0 (H)   02/07/2022 7.4 (H)   06/14/2021 8.1 (H)   02/16/2021 8.5 (H)     LDL Cholesterol Calculated (mg/dL)   Date Value   02/07/2022 105 (H)   02/16/2021 110 (H)   01/28/2020 83                  How many servings of fruits and vegetables do you eat daily?  0-1    On average, how many sweetened beverages do you drink each day (Examples: soda, juice, sweet tea, etc.  Do NOT count diet or artificially sweetened beverages)?   1    How many days per week do you exercise enough to make your heart beat faster? 3 or less    How many minutes a day do you exercise enough to make your heart beat faster? 9 or less    How many days per week do you miss taking your medication? 0    Hypertension Follow-up      Do you check your blood pressure regularly outside of the clinic? Yes     Are you following a low salt diet? No    Are your blood pressures ever more than 140 on the top number (systolic) OR more   than 90 on the bottom number (diastolic), for example 140/90? Yes        Social History     Tobacco Use     Smoking status: Some Days     Types: Cigarettes     Smokeless tobacco: Never   Vaping Use     Vaping Use: Never used   Substance Use Topics     Alcohol use: No     Drug use: No     PHQ 2/7/2022 9/26/2022 10/19/2022   PHQ-9 Total Score 17 3 12   Q9: Thoughts of better off dead/self-harm past 2 weeks More than half the days Not at all Not at all     JOSH-7 SCORE 1/28/2020 4/2/2020 2/7/2022   Total Score - - -   Total Score 0 4 15       Suicide Assessment Five-step Evaluation and Treatment (SAFE-T)        Review of Systems    Constitutional, cardiovascular, endocrine systems are negative, except as otherwise noted.      Objective    /68 (BP Location: Right arm, Patient Position: Sitting, Cuff Size: Adult Large)   Pulse 80   Temp 97.5  F (36.4  C) (Tympanic)   Resp 20   Wt 103.4 kg (228 lb)   LMP  (LMP Unknown)   SpO2 92%   BMI 39.14 kg/m    Body mass index is 39.14 kg/m .  Physical Exam   GENERAL: healthy, alert and no distress  MS: no gross musculoskeletal defects noted, no edema  SKIN: no suspicious lesions or rashes  NEURO: Normal strength and tone, mentation intact and speech normal  PSYCH: mentation appears normal, affect normal/bright

## 2022-10-19 NOTE — LETTER
October 21, 2022      Jacob Cai  [address]  [place] [address]        Dear ,    We are writing to inform you of your test results.    Your electrolytes and kidney function looks good.     Resulted Orders   Basic metabolic panel  (Ca, Cl, CO2, Creat, Gluc, K, Na, BUN)   Result Value Ref Range    Sodium 138 133 - 144 mmol/L    Potassium 3.9 3.4 - 5.3 mmol/L    Chloride 102 94 - 109 mmol/L    Carbon Dioxide (CO2) 30 20 - 32 mmol/L    Anion Gap 6 3 - 14 mmol/L    Urea Nitrogen 18 7 - 30 mg/dL    Creatinine 0.50 (L) 0.52 - 1.04 mg/dL    Calcium 9.6 8.5 - 10.1 mg/dL    Glucose 125 (H) 70 - 99 mg/dL    GFR Estimate >90 >60 mL/min/1.73m2      Comment:      Effective December 21, 2021 eGFRcr in adults is calculated using the 2021 CKD-EPI creatinine equation which includes age and gender (Alfonso et al., NEJ, DOI: 10.1056/XSSXue9025343)       If you have any questions or concerns, please call the clinic at the number listed above.       Sincerely,    KAYLA Gimenez

## 2022-10-19 NOTE — PROGRESS NOTES
New Heart Failure Referral     S: Patient being referred by Clara Ambriz PA-C [facility]      B/A: Talk to daughter to schedule. Wants to be seen in [place]. Sending to HF RN to review      R:Offer 11/10 with  at 11 echo at 10. In [facility] clinic spots held

## 2022-10-21 NOTE — RESULT ENCOUNTER NOTE
Please send the following letter to the patient:    Jeramiehisander,    Your electrolytes and kidney function looks good.    Please call me with any questions or concerns.          Clara Ambriz PA-C

## 2022-11-10 ENCOUNTER — OFFICE VISIT (OUTPATIENT)
Dept: CARDIOLOGY | Facility: CLINIC | Age: 62
End: 2022-11-10
Attending: PHYSICIAN ASSISTANT
Payer: MEDICARE

## 2022-11-10 ENCOUNTER — ANCILLARY PROCEDURE (OUTPATIENT)
Dept: CARDIOLOGY | Facility: CLINIC | Age: 62
End: 2022-11-10
Attending: INTERNAL MEDICINE
Payer: MEDICARE

## 2022-11-10 VITALS
BODY MASS INDEX: 38.96 KG/M2 | OXYGEN SATURATION: 96 % | HEART RATE: 71 BPM | WEIGHT: 227 LBS | DIASTOLIC BLOOD PRESSURE: 71 MMHG | SYSTOLIC BLOOD PRESSURE: 110 MMHG

## 2022-11-10 DIAGNOSIS — R06.02 SHORTNESS OF BREATH: ICD-10-CM

## 2022-11-10 DIAGNOSIS — E78.5 HYPERLIPIDEMIA LDL GOAL <100: Primary | ICD-10-CM

## 2022-11-10 DIAGNOSIS — R07.89 CHEST DISCOMFORT: ICD-10-CM

## 2022-11-10 DIAGNOSIS — I10 BENIGN ESSENTIAL HYPERTENSION WITH TARGET BLOOD PRESSURE BELOW 140/90: ICD-10-CM

## 2022-11-10 DIAGNOSIS — I16.1 HYPERTENSIVE EMERGENCY: ICD-10-CM

## 2022-11-10 LAB — LVEF ECHO: NORMAL

## 2022-11-10 PROCEDURE — 93306 TTE W/DOPPLER COMPLETE: CPT | Performed by: INTERNAL MEDICINE

## 2022-11-10 PROCEDURE — 99204 OFFICE O/P NEW MOD 45 MIN: CPT | Performed by: INTERNAL MEDICINE

## 2022-11-10 NOTE — NURSING NOTE
"Chief Complaint   Patient presents with     EBENEZER Doss referral placed due to CHF?? Echo schedule prior. Patient reportschest pain. ED 9/23 for HTN.        Initial /71 (BP Location: Left arm, Patient Position: Chair, Cuff Size: Adult Large)   Pulse 71   Wt 103 kg (227 lb)   LMP  (LMP Unknown)   SpO2 96%   BMI 38.96 kg/m   Estimated body mass index is 38.96 kg/m  as calculated from the following:    Height as of 2/7/22: 1.626 m (5' 4\").    Weight as of this encounter: 103 kg (227 lb)..  BP completed using cuff size: ASHTYN Varela  "

## 2022-11-10 NOTE — PATIENT INSTRUCTIONS
Thank you for coming to the [facility] @ [facility]; please note the following instructions:    1. Dr. MARCY Doss would like you to return for a cardiac follow up in 1 year  (November).  We will contact you regarding your appointment when the time draws closer or you may call [phone] option #1 to arrange an appointment.  Mean while, if you should have any questions or concerns regarding your heart health, please contact us.  Thank you for choosing [facility] for your care.         If you have any questions regarding your visit please contact your care team:     Cardiology  Telephone Number   Karen ANDUJAR., RN  Latesha HOWARD, RN   Kiara CHACON, RMA  Cinda MOLINA, RMA  Cindy DERAS, Visit Facilitator  Lory Patton., [facility] [phone] (option 1)   For scheduling appts:     [phone] (select option 1)       For the Device Clinic (Pacemakers and ICD's)  RN's :  Dahiana Rosales   During business hours: [phone]    *After business hours:  [phone] (select option 4)      Normal test result notifications will be released via [employer] or mailed within 7 business days.  All other test results, will be communicated via telephone once reviewed by your cardiologist.    If you need a medication refill please contact your pharmacy.  Please allow 3 business days for your refill to be completed.    As always, thank you for trusting us with your health care needs!

## 2022-11-10 NOTE — LETTER
11/10/2022      RE: Jacob Cai  [address]  [place] [facility] [address]       Dear Colleague,    Thank you for the opportunity to participate in the care of your patient, Jacob Cai, at the [facility]. Please see a copy of my visit note below.       SUBJECTIVE:  Jacob Cai is a 61 year old female who presents for post ED discharge follow-up.  Concern of CHF.  Patient presented [facility] emergency room on 9/23/2022 reporting that her blood pressure was high at home when showing 190 systolic.  Also had some chest discomfort.  Triage  blood pressure was 214 x 113 mmHg.  Patient had CT scan of chest to rule out PE as she had mild elevation of D-dimer 2.9.  This showed no PE but was reported as showing four-chamber cardiac enlargement with evidence of pulmonary edema.  Patient was treated and discharged.  Since discharge patient is doing well and had no cardiac complaints today.  Patient's prior cardiac history is significant for hypertension and hyperlipidemia.  Also has type 2 diabetes.  States intermittent smoking.  No premature coronary artery disease in family.  Current BMI is 40.  Patient Active Problem List    Diagnosis Date Noted     Benign essential hypertension with target blood pressure below 140/90 11/15/2019     Priority: Medium     History of thyroidectomy, total 03/31/2015     Priority: Medium     Patient reported in 1997; she is unable to remember which location she had   this done. Says she has not required thyroid supplementation since.       JOSH (generalized anxiety disorder) 01/02/2013     Priority: Medium     Diagnosis updated by automated process. Provider to review and confirm.       Type 2 diabetes mellitus without complication, without long-term current use of insulin (H) 07/06/2012     Priority: Medium     Hyperlipidemia LDL goal <100 06/18/2012     Priority: Medium     Morbid obesity  (H) 05/16/2011     Priority: Medium     Degenerative joint disease of knee 04/29/2011     Priority: Medium     Mild major depression (H) 07/19/2010     Priority: Medium     GERD (gastroesophageal reflux disease) 09/21/2009     Priority: Medium    .  Current Outpatient Medications   Medication Sig     amLODIPine (NORVASC) 10 MG tablet Take 1 tablet (10 mg) by mouth daily     aspirin (ASA) 81 MG EC tablet Take 81 mg by mouth every 24 hours     DULoxetine (CYMBALTA) 60 MG capsule Take 1 capsule (60 mg) by mouth daily     glipiZIDE (GLUCOTROL XL) 10 MG 24 hr tablet Take 2 tablets (20 mg) by mouth daily     hydrochlorothiazide (HYDRODIURIL) 50 MG tablet Take 1 tablet (50 mg) by mouth daily for 90 days     insulin detemir (LEVEMIR FLEXTOUCH) 100 UNIT/ML pen Inject 15 Units Subcutaneous At Bedtime - dose increase 10/19/22     liraglutide (VICTOZA PEN) 18 MG/3ML solution Inject 1.8 mg Subcutaneous daily     lisinopril (ZESTRIL) 40 MG tablet Take 1 tablet (40 mg) by mouth daily     metFORMIN (GLUCOPHAGE XR) 500 MG 24 hr tablet Take 2 tablets (1,000 mg) by mouth 2 times daily (with meals)     metoprolol succinate ER (TOPROL XL) 100 MG 24 hr tablet Take 1 tablet (100 mg) by mouth daily     sertraline (ZOLOFT) 100 MG tablet Take 2 tablets (200 mg) by mouth daily     simvastatin (ZOCOR) 20 MG tablet Take 1 tablet (20 mg) by mouth At Bedtime     alcohol swab prep pads Use to swab area of injection/elvia as directed     blood glucose (NO BRAND SPECIFIED) test strip Use to test blood sugar 1 times daily or as directed.     blood glucose calibration (NO BRAND SPECIFIED) solution Use to calibrate blood glucose monitor as needed as directed. To accompany: Blood Glucose Monitor Brands: per insurance.     glucose blood VI test strips strip Test twice per day     insulin pen needle (ULTICARE PEN NEEDLES) 31G X 5 MM miscellaneous USE PEN NEEDLES DAILY OR AS DIRECTED.     OneTouch Delica Lancets 33G MISC 1 each 2 times daily     ONETOUCH  VERIO IQ test strip Use to test blood sugar 2 times daily     ORDER FOR DME Equipment being ordered: One touch ultra blood glucose meter.     No current facility-administered medications for this visit.     Past Medical History:   Diagnosis Date     Hypertension      Past Surgical History:   Procedure Laterality Date     ABDOMEN SURGERY  2007    Tumor in the stomach     HYSTERECTOMY, PAP NO LONGER INDICATED       HYSTERECTOMY, [facility]  8/2006     Allergies   Allergen Reactions     Nkda [No Known Drug Allergies]      Social History     Socioeconomic History     Marital status:      Spouse name: Not on file     Number of children: Not on file     Years of education: Not on file     Highest education level: Not on file   Occupational History     Not on file   Tobacco Use     Smoking status: Some Days     Types: Cigarettes     Smokeless tobacco: Never   Vaping Use     Vaping Use: Never used   Substance and Sexual Activity     Alcohol use: No     Drug use: No     Sexual activity: Not Currently     Partners: Male     Birth control/protection: Surgical     Comment: [facility]   Other Topics Concern     Parent/sibling w/ CABG, MI or angioplasty before 65F 55M? Not Asked      Service No     Blood Transfusions No     Caffeine Concern No     Occupational Exposure No     Hobby Hazards No     Sleep Concern Yes     Stress Concern Yes     Weight Concern No     Special Diet No     Back Care No     Exercise Yes     Comment: occasional     Bike Helmet No     Comment: N/A     Seat Belt Yes     Comment: 100%     Self-Exams No   Social History Narrative     Not on file     Social Determinants of Health     Financial Resource Strain: Not on file   Food Insecurity: Not on file   Transportation Needs: Not on file   Physical Activity: Not on file   Stress: Not on file   Social Connections: Not on file   Intimate Partner Violence: Not on file   Housing Stability: Not on file     Family History   Problem Relation Age of Onset     No Known  Problems Mother      No Known Problems Father      No Known Problems Brother      No Known Problems Sister           REVIEW OF SYSTEMS:  General: negative, fever, chills, night sweats  Skin: negative, acne, rash and scaling  Eyes: negative, double vision, eye pain and photophobia  Ears/Nose/Throat: negative, nasal congestion and purulent rhinorrhea  Respiratory: No dyspnea on exertion, No cough, No hemoptysis and negative  Cardiovascular: negative, palpitations, tachycardia, irregular heart beat, chest pain, exertional chest pain or pressure, paroxysmal nocturnal dyspnea, dyspnea on exertion and orthopnea       OBJECTIVE:  Blood pressure 110/71, pulse 71, weight 103 kg (227 lb), SpO2 96 %.  General Appearance: healthy, alert, active and no distress  Head: Normocephalic. No masses, lesions, tenderness or abnormalities  Eyes: conjuctiva clear, PERRL, EOM intact  Ears: External ears normal. Canals clear. TM's normal.  Nose: Nares normal  Mouth: normal  Neck: Supple, no cervical adenopathy, no thyromegaly  Lungs: clear to auscultation  Cardiac: regular rate and rhythm, normal S1 and S2, no murmur       ASSESSMENT/PLAN:  Patient here for cardiac evaluation after an emergency room visit.  Patient with known hypertension and hyperlipidemia.  Prior evaluation showed normal cardiac function.  On 9/23/2022 patient attended emergency room complaining that her blood pressure at home was high 190 systolic.  Also had some chest discomfort.  Triage blood pressure was 214 x 113 mmHg.  Patient was evaluated and discharged home.  Reviewed her records.  EKG showed normal sinus rhythm normal EKG.  One of them showed nonspecific ST-T changes.  Lab wise troponin was normal.  BNP was 700 mildly elevated.  Patient's D dimer was marginally elevated at 0.9.  Normal basic metabolic panel apart from mild elevation of blood glucose.  White cell count was normal.  Due to elevated D-dimer patient had a CT PE protocol.  This showed no PE but  reported as showing four-chamber enlargement of the heart and pulmonary congestion.  Since discharge patient is doing very well.  Had no cardiac complaints today.  Today's blood pressure at clinic was normal.  Current cardiac medications are lisinopril 40 mg daily, Toprol- mg daily simvastatin 20 mg daily hydrochlorothiazide 50 mg daily and amlodipine 10 mg daily.  Patient had an echocardiogram today.  This showed completely normal LV function at 60 to 65%.  Normal LV size.  Mild LVH.  Diastolic function was indeterminate but elevated E by E prime.  Patient remain active and asymptomatic.  As her blood pressure is controlled with a normal cardiac function and size she is advised to continue with her current medications and keep an yearly follow-up.  Prior echocardiogram in our system reviewed.  Normal biventricular function.  No significant valvular abnormalities.  EKG showed normal sinus rhythm normal EKGs.  Patient had an abnormal stress echocardiogram in 2012.  This was followed by a coronary angiogram which showed normal coronary arteries.  Total visit duration 45 minutes.  This included face-to-face interview, physical exam, chart review, review of Care Everywhere including ED records, lab results EKGs chest CT scan, echocardiogram from our system, stress echo coronary angiogram and documentation.      Please do not hesitate to contact me if you have any questions/concerns.     Sincerely,     AARON Fierro MD

## 2022-11-10 NOTE — PROGRESS NOTES
SUBJECTIVE:  Jacob Cai is a 61 year old female who presents for post ED discharge follow-up.  Concern of CHF.  Patient presented [facility] emergency room on 9/23/2022 reporting that her blood pressure was high at home when showing 190 systolic.  Also had some chest discomfort.  Triage  blood pressure was 214 x 113 mmHg.  Patient had CT scan of chest to rule out PE as she had mild elevation of D-dimer 2.9.  This showed no PE but was reported as showing four-chamber cardiac enlargement with evidence of pulmonary edema.  Patient was treated and discharged.  Since discharge patient is doing well and had no cardiac complaints today.  Patient's prior cardiac history is significant for hypertension and hyperlipidemia.  Also has type 2 diabetes.  States intermittent smoking.  No premature coronary artery disease in family.  Current BMI is 40.  Patient Active Problem List    Diagnosis Date Noted     Benign essential hypertension with target blood pressure below 140/90 11/15/2019     Priority: Medium     History of thyroidectomy, total 03/31/2015     Priority: Medium     Patient reported in 1997; she is unable to remember which location she had   this done. Says she has not required thyroid supplementation since.       JOSH (generalized anxiety disorder) 01/02/2013     Priority: Medium     Diagnosis updated by automated process. Provider to review and confirm.       Type 2 diabetes mellitus without complication, without long-term current use of insulin (H) 07/06/2012     Priority: Medium     Hyperlipidemia LDL goal <100 06/18/2012     Priority: Medium     Morbid obesity (H) 05/16/2011     Priority: Medium     Degenerative joint disease of knee 04/29/2011     Priority: Medium     Mild major depression (H) 07/19/2010     Priority: Medium     GERD (gastroesophageal reflux disease) 09/21/2009     Priority: Medium    .  Current Outpatient Medications   Medication Sig     amLODIPine (NORVASC) 10 MG tablet Take 1 tablet  (10 mg) by mouth daily     aspirin (ASA) 81 MG EC tablet Take 81 mg by mouth every 24 hours     DULoxetine (CYMBALTA) 60 MG capsule Take 1 capsule (60 mg) by mouth daily     glipiZIDE (GLUCOTROL XL) 10 MG 24 hr tablet Take 2 tablets (20 mg) by mouth daily     hydrochlorothiazide (HYDRODIURIL) 50 MG tablet Take 1 tablet (50 mg) by mouth daily for 90 days     insulin detemir (LEVEMIR FLEXTOUCH) 100 UNIT/ML pen Inject 15 Units Subcutaneous At Bedtime - dose increase 10/19/22     liraglutide (VICTOZA PEN) 18 MG/3ML solution Inject 1.8 mg Subcutaneous daily     lisinopril (ZESTRIL) 40 MG tablet Take 1 tablet (40 mg) by mouth daily     metFORMIN (GLUCOPHAGE XR) 500 MG 24 hr tablet Take 2 tablets (1,000 mg) by mouth 2 times daily (with meals)     metoprolol succinate ER (TOPROL XL) 100 MG 24 hr tablet Take 1 tablet (100 mg) by mouth daily     sertraline (ZOLOFT) 100 MG tablet Take 2 tablets (200 mg) by mouth daily     simvastatin (ZOCOR) 20 MG tablet Take 1 tablet (20 mg) by mouth At Bedtime     alcohol swab prep pads Use to swab area of injection/elvia as directed     blood glucose (NO BRAND SPECIFIED) test strip Use to test blood sugar 1 times daily or as directed.     blood glucose calibration (NO BRAND SPECIFIED) solution Use to calibrate blood glucose monitor as needed as directed. To accompany: Blood Glucose Monitor Brands: per insurance.     glucose blood VI test strips strip Test twice per day     insulin pen needle (ULTICARE PEN NEEDLES) 31G X 5 MM miscellaneous USE PEN NEEDLES DAILY OR AS DIRECTED.     OneTouch Delica Lancets 33G MISC 1 each 2 times daily     ONETOUCH VERIO IQ test strip Use to test blood sugar 2 times daily     ORDER FOR DME Equipment being ordered: One touch ultra blood glucose meter.     No current facility-administered medications for this visit.     Past Medical History:   Diagnosis Date     Hypertension      Past Surgical History:   Procedure Laterality Date     ABDOMEN SURGERY  2007     Tumor in the stomach     HYSTERECTOMY, PAP NO LONGER INDICATED       HYSTERECTOMY, [facility]  8/2006     Allergies   Allergen Reactions     Nkda [No Known Drug Allergies]      Social History     Socioeconomic History     Marital status:      Spouse name: Not on file     Number of children: Not on file     Years of education: Not on file     Highest education level: Not on file   Occupational History     Not on file   Tobacco Use     Smoking status: Some Days     Types: Cigarettes     Smokeless tobacco: Never   Vaping Use     Vaping Use: Never used   Substance and Sexual Activity     Alcohol use: No     Drug use: No     Sexual activity: Not Currently     Partners: Male     Birth control/protection: Surgical     Comment: [facility]   Other Topics Concern     Parent/sibling w/ CABG, MI or angioplasty before 65F 55M? Not Asked      Service No     Blood Transfusions No     Caffeine Concern No     Occupational Exposure No     Hobby Hazards No     Sleep Concern Yes     Stress Concern Yes     Weight Concern No     Special Diet No     Back Care No     Exercise Yes     Comment: occasional     Bike Helmet No     Comment: N/A     Seat Belt Yes     Comment: 100%     Self-Exams No   Social History Narrative     Not on file     Social Determinants of Health     Financial Resource Strain: Not on file   Food Insecurity: Not on file   Transportation Needs: Not on file   Physical Activity: Not on file   Stress: Not on file   Social Connections: Not on file   Intimate Partner Violence: Not on file   Housing Stability: Not on file     Family History   Problem Relation Age of Onset     No Known Problems Mother      No Known Problems Father      No Known Problems Brother      No Known Problems Sister           REVIEW OF SYSTEMS:  General: negative, fever, chills, night sweats  Skin: negative, acne, rash and scaling  Eyes: negative, double vision, eye pain and photophobia  Ears/Nose/Throat: negative, nasal congestion and purulent  rhinorrhea  Respiratory: No dyspnea on exertion, No cough, No hemoptysis and negative  Cardiovascular: negative, palpitations, tachycardia, irregular heart beat, chest pain, exertional chest pain or pressure, paroxysmal nocturnal dyspnea, dyspnea on exertion and orthopnea       OBJECTIVE:  Blood pressure 110/71, pulse 71, weight 103 kg (227 lb), SpO2 96 %.  General Appearance: healthy, alert, active and no distress  Head: Normocephalic. No masses, lesions, tenderness or abnormalities  Eyes: conjuctiva clear, PERRL, EOM intact  Ears: External ears normal. Canals clear. TM's normal.  Nose: Nares normal  Mouth: normal  Neck: Supple, no cervical adenopathy, no thyromegaly  Lungs: clear to auscultation  Cardiac: regular rate and rhythm, normal S1 and S2, no murmur       ASSESSMENT/PLAN:  Patient here for cardiac evaluation after an emergency room visit.  Patient with known hypertension and hyperlipidemia.  Prior evaluation showed normal cardiac function.  On 9/23/2022 patient attended emergency room complaining that her blood pressure at home was high 190 systolic.  Also had some chest discomfort.  Triage blood pressure was 214 x 113 mmHg.  Patient was evaluated and discharged home.  Reviewed her records.  EKG showed normal sinus rhythm normal EKG.  One of them showed nonspecific ST-T changes.  Lab wise troponin was normal.  BNP was 700 mildly elevated.  Patient's D dimer was marginally elevated at 0.9.  Normal basic metabolic panel apart from mild elevation of blood glucose.  White cell count was normal.  Due to elevated D-dimer patient had a CT PE protocol.  This showed no PE but reported as showing four-chamber enlargement of the heart and pulmonary congestion.  Since discharge patient is doing very well.  Had no cardiac complaints today.  Today's blood pressure at clinic was normal.  Current cardiac medications are lisinopril 40 mg daily, Toprol- mg daily simvastatin 20 mg daily hydrochlorothiazide 50 mg daily  and amlodipine 10 mg daily.  Patient had an echocardiogram today.  This showed completely normal LV function at 60 to 65%.  Normal LV size.  Mild LVH.  Diastolic function was indeterminate but elevated E by E prime.  Patient remain active and asymptomatic.  As her blood pressure is controlled with a normal cardiac function and size she is advised to continue with her current medications and keep an yearly follow-up.  Prior echocardiogram in our system reviewed.  Normal biventricular function.  No significant valvular abnormalities.  EKG showed normal sinus rhythm normal EKGs.  Patient had an abnormal stress echocardiogram in 2012.  This was followed by a coronary angiogram which showed normal coronary arteries.  Total visit duration 45 minutes.  This included face-to-face interview, physical exam, chart review, review of Care Everywhere including ED records, lab results EKGs chest CT scan, echocardiogram from our system, stress echo coronary angiogram and documentation.

## 2023-01-20 ENCOUNTER — OFFICE VISIT (OUTPATIENT)
Dept: FAMILY MEDICINE | Facility: CLINIC | Age: 63
End: 2023-01-20
Payer: MEDICARE

## 2023-01-20 VITALS
DIASTOLIC BLOOD PRESSURE: 58 MMHG | SYSTOLIC BLOOD PRESSURE: 114 MMHG | HEART RATE: 72 BPM | WEIGHT: 234.06 LBS | TEMPERATURE: 98 F | HEIGHT: 64 IN | BODY MASS INDEX: 39.96 KG/M2 | OXYGEN SATURATION: 95 % | RESPIRATION RATE: 20 BRPM

## 2023-01-20 DIAGNOSIS — E66.01 MORBID OBESITY (H): ICD-10-CM

## 2023-01-20 DIAGNOSIS — E78.5 HYPERLIPIDEMIA LDL GOAL <100: ICD-10-CM

## 2023-01-20 DIAGNOSIS — I10 BENIGN ESSENTIAL HYPERTENSION WITH TARGET BLOOD PRESSURE BELOW 140/90: ICD-10-CM

## 2023-01-20 DIAGNOSIS — F32.0 MILD MAJOR DEPRESSION (H): ICD-10-CM

## 2023-01-20 DIAGNOSIS — E11.9 TYPE 2 DIABETES MELLITUS WITHOUT COMPLICATION, WITHOUT LONG-TERM CURRENT USE OF INSULIN (H): Primary | ICD-10-CM

## 2023-01-20 LAB
CHOLEST SERPL-MCNC: 198 MG/DL
FASTING STATUS PATIENT QL REPORTED: NO
HBA1C MFR BLD: 7 % (ref 0–5.6)
HDLC SERPL-MCNC: 30 MG/DL
LDLC SERPL CALC-MCNC: 101 MG/DL
NONHDLC SERPL-MCNC: 168 MG/DL
TRIGL SERPL-MCNC: 337 MG/DL

## 2023-01-20 PROCEDURE — 80061 LIPID PANEL: CPT | Performed by: PHYSICIAN ASSISTANT

## 2023-01-20 PROCEDURE — 99213 OFFICE O/P EST LOW 20 MIN: CPT | Performed by: PHYSICIAN ASSISTANT

## 2023-01-20 PROCEDURE — 36415 COLL VENOUS BLD VENIPUNCTURE: CPT | Performed by: PHYSICIAN ASSISTANT

## 2023-01-20 PROCEDURE — 83036 HEMOGLOBIN GLYCOSYLATED A1C: CPT | Performed by: PHYSICIAN ASSISTANT

## 2023-01-20 RX ORDER — AMLODIPINE BESYLATE 10 MG/1
10 TABLET ORAL DAILY
Qty: 90 TABLET | Refills: 3 | Status: SHIPPED | OUTPATIENT
Start: 2023-01-20 | End: 2024-01-03

## 2023-01-20 RX ORDER — INSULIN DETEMIR 100 [IU]/ML
15 INJECTION, SOLUTION SUBCUTANEOUS AT BEDTIME
Qty: 15 ML | Refills: 0 | Status: SHIPPED | OUTPATIENT
Start: 2023-01-20 | End: 2023-04-28

## 2023-01-20 ASSESSMENT — ANXIETY QUESTIONNAIRES
GAD7 TOTAL SCORE: 5
1. FEELING NERVOUS, ANXIOUS, OR ON EDGE: MORE THAN HALF THE DAYS
IF YOU CHECKED OFF ANY PROBLEMS ON THIS QUESTIONNAIRE, HOW DIFFICULT HAVE THESE PROBLEMS MADE IT FOR YOU TO DO YOUR WORK, TAKE CARE OF THINGS AT HOME, OR GET ALONG WITH OTHER PEOPLE: NOT DIFFICULT AT ALL
3. WORRYING TOO MUCH ABOUT DIFFERENT THINGS: SEVERAL DAYS
2. NOT BEING ABLE TO STOP OR CONTROL WORRYING: NOT AT ALL
7. FEELING AFRAID AS IF SOMETHING AWFUL MIGHT HAPPEN: SEVERAL DAYS
5. BEING SO RESTLESS THAT IT IS HARD TO SIT STILL: NOT AT ALL
6. BECOMING EASILY ANNOYED OR IRRITABLE: SEVERAL DAYS
GAD7 TOTAL SCORE: 5

## 2023-01-20 ASSESSMENT — PATIENT HEALTH QUESTIONNAIRE - PHQ9
5. POOR APPETITE OR OVEREATING: NOT AT ALL
SUM OF ALL RESPONSES TO PHQ QUESTIONS 1-9: 5

## 2023-01-20 ASSESSMENT — PAIN SCALES - GENERAL: PAINLEVEL: NO PAIN (0)

## 2023-01-20 NOTE — PATIENT INSTRUCTIONS
Fasting blood sugar, in the morning: <130  A blood sugar 2 hours after a meal: <180    Have someone who speaks English call your insurance and ask:  -- What test for colon cancer screening is covered? Cologard? A colonoscopy?  -- Do you have an eye clinic or provider that is in network?

## 2023-01-20 NOTE — PROGRESS NOTES
"  Assessment & Plan       ICD-10-CM    1. Type 2 diabetes mellitus without complication, without long-term current use of insulin (H)  E11.9 Lipid panel reflex to direct LDL Non-fasting     insulin detemir (LEVEMIR FLEXTOUCH) 100 UNIT/ML pen     Hemoglobin A1c     Hemoglobin A1c     Lipid panel reflex to direct LDL Non-fasting     blood glucose (NO BRAND SPECIFIED) test strip      2. Hyperlipidemia LDL goal <100  E78.5       3. Benign essential hypertension with target blood pressure below 140/90  I10 amLODIPine (NORVASC) 10 MG tablet      4. Morbid obesity (H)  E66.01       5. Mild major depression (H)  F32.0           1,2) A1c is much improved. Continue medications and insulin, no changes today. Will repeat another A1c in 3 months. Lipids in process.     3) Med renewed, not discussed    4) Comorbidity    5) Follows psychiatry      Ordering of each unique test  Prescription drug management         BMI:   Estimated body mass index is 39.72 kg/m  as calculated from the following:    Height as of this encounter: 1.635 m (5' 4.37\").    Weight as of this encounter: 106.2 kg (234 lb 1 oz).       Return in about 3 months (around 4/20/2023) for a repeat A1c, lab only ok.    Clara Ambriz PA-C  [facility] CASA James is a 62 year old, presenting for the following health issues:  Diabetes      HPI     Diabetes Follow-up      How often are you checking your blood sugar? Not at all    What concerns do you have today about your diabetes? None     Do you have any of these symptoms? (Select all that apply)  Blurry vision and Weight gain    Have you had a diabetic eye exam in the last 12 months? Yes- Date of last eye exam: pt unsure,  Location: [facility]            Hyperlipidemia Follow-Up      Are you regularly taking any medication or supplement to lower your cholesterol?   Yes- simvastatin (ZOCOR) 20 MG tablet    Are you having muscle aches or other side effects that you " "think could be caused by your cholesterol lowering medication?  No      BP Readings from Last 2 Encounters:   01/20/23 114/58   11/10/22 110/71     Hemoglobin A1C (%)   Date Value   09/26/2022 8.0 (H)   02/07/2022 7.4 (H)   06/14/2021 8.1 (H)   02/16/2021 8.5 (H)     LDL Cholesterol Calculated (mg/dL)   Date Value   02/07/2022 105 (H)   02/16/2021 110 (H)   01/28/2020 83         How many servings of fruits and vegetables do you eat daily?  1-2    On average, how many sweetened beverages do you drink each day (Examples: soda, juice, sweet tea, etc.  Do NOT count diet or artificially sweetened beverages)?  1    How many days per week do you exercise enough to make your heart beat faster? 3 or less    How many minutes a day do you exercise enough to make your heart beat faster? 9 or less    How many days per week do you miss taking your medication? 0      Review of Systems   Constitutional, CV, endocrine systems are negative, except as otherwise noted.      Objective    /58 (BP Location: Left arm, Patient Position: Sitting, Cuff Size: Adult Large)   Pulse 72   Temp 98  F (36.7  C) (Tympanic)   Resp 20   Ht 1.635 m (5' 4.37\")   Wt 106.2 kg (234 lb 1 oz)   LMP  (LMP Unknown)   SpO2 95%   BMI 39.72 kg/m    Body mass index is 39.72 kg/m .  Physical Exam   GENERAL: healthy, alert and no distress  MS: no gross musculoskeletal defects noted, no edema  SKIN: no suspicious lesions or rashes  NEURO: Normal strength and tone, mentation intact and speech normal  PSYCH: mentation appears normal, affect normal/bright    Results for orders placed or performed in visit on 01/20/23 (from the past 24 hour(s))   Hemoglobin A1c   Result Value Ref Range    Hemoglobin A1C 7.0 (H) 0.0 - 5.6 %       ----- Services Performed by a MEDICAL STUDENT in Presence of ATTENDING Physician-------            "

## 2023-01-21 ENCOUNTER — TELEPHONE (OUTPATIENT)
Dept: FAMILY MEDICINE | Facility: CLINIC | Age: 63
End: 2023-01-21
Payer: MEDICARE

## 2023-01-21 DIAGNOSIS — I10 BENIGN ESSENTIAL HYPERTENSION WITH TARGET BLOOD PRESSURE BELOW 140/90: ICD-10-CM

## 2023-01-23 RX ORDER — HYDROCHLOROTHIAZIDE 25 MG/1
25 TABLET ORAL DAILY
Qty: 90 TABLET | Refills: 3 | OUTPATIENT
Start: 2023-01-23

## 2023-01-23 NOTE — TELEPHONE ENCOUNTER
Please call patient for clarification on current dose.    Dahiana Hobson RN BSN  [facility]

## 2023-02-03 ENCOUNTER — TELEPHONE (OUTPATIENT)
Dept: FAMILY MEDICINE | Facility: CLINIC | Age: 63
End: 2023-02-03
Payer: MEDICARE

## 2023-02-03 DIAGNOSIS — E11.9 TYPE 2 DIABETES MELLITUS WITHOUT COMPLICATION, WITHOUT LONG-TERM CURRENT USE OF INSULIN (H): ICD-10-CM

## 2023-02-03 RX ORDER — PEN NEEDLE, DIABETIC 29 G X1/2"
NEEDLE, DISPOSABLE MISCELLANEOUS
Qty: 200 EACH | Refills: 0 | Status: SHIPPED | OUTPATIENT
Start: 2023-02-03 | End: 2023-07-24

## 2023-02-03 NOTE — TELEPHONE ENCOUNTER
Patient was seen 1/20/23 by PCP Clara Ambriz.  Test strips were sent to [facility] pharmacy.   What supplies does she need?    I called   Services, placed call to patient with assistance of Ana María  #[phone].    Plan at 1/20/23 visit:    Return in about 3 months (around 4/20/2023) for a repeat A1c, lab only ok.    Patient answered.   Says she needs test strips and pen needles.   I advised the test strips were sent to [facility] pharmacy.         She says she used to get delivery from them, now she wants to use [facility] in [place].    I sent test strips and pen needles to [facility] now.    Claudia Potts RN  [facility]

## 2023-02-03 NOTE — TELEPHONE ENCOUNTER
Reason for Call:  Medication or medication refill:    Do you use a [facility]?  Name of the pharmacy and phone number for the current request:  [facility] in [place]     Name of the medication requested: Supplies for diabetes    Other request:  ASAP.    Can we leave a detailed message on this number? YES    Phone number patient can be reached at: Cell number on file:    Telephone Information:   Mobile [phone]       Best Time: anytime    Call taken on 2/3/2023 at 1:51 PM by MICHELLE ARCE

## 2023-02-09 ENCOUNTER — TELEPHONE (OUTPATIENT)
Dept: FAMILY MEDICINE | Facility: CLINIC | Age: 63
End: 2023-02-09
Payer: MEDICARE

## 2023-02-09 NOTE — TELEPHONE ENCOUNTER
Forms received from: [employer]    Phone number listed:    Fax listed:[phone]  Date received: 02/09/2023  Form description: diabetic detailed written order  Once forms are completed, please return to [employer]  via fax.  Form placed: in providers in [place]es [place]

## 2023-02-10 ENCOUNTER — MEDICAL CORRESPONDENCE (OUTPATIENT)
Dept: HEALTH INFORMATION MANAGEMENT | Facility: CLINIC | Age: 63
End: 2023-02-10
Payer: MEDICARE

## 2023-04-20 ENCOUNTER — LAB (OUTPATIENT)
Dept: LAB | Facility: CLINIC | Age: 63
End: 2023-04-20
Payer: MEDICARE

## 2023-04-20 DIAGNOSIS — E11.9 TYPE 2 DIABETES MELLITUS WITHOUT COMPLICATION, WITHOUT LONG-TERM CURRENT USE OF INSULIN (H): ICD-10-CM

## 2023-04-20 DIAGNOSIS — E78.5 HYPERLIPIDEMIA LDL GOAL <100: ICD-10-CM

## 2023-04-20 DIAGNOSIS — I50.9 CHRONIC CONGESTIVE HEART FAILURE, UNSPECIFIED HEART FAILURE TYPE (H): ICD-10-CM

## 2023-04-20 LAB
ALT SERPL W P-5'-P-CCNC: 36 U/L (ref 0–50)
ERYTHROCYTE [DISTWIDTH] IN BLOOD BY AUTOMATED COUNT: 13.3 % (ref 10–15)
HCT VFR BLD AUTO: 40.7 % (ref 35–47)
HGB BLD-MCNC: 13.4 G/DL (ref 11.7–15.7)
MCH RBC QN AUTO: 29.8 PG (ref 26.5–33)
MCHC RBC AUTO-ENTMCNC: 32.9 G/DL (ref 31.5–36.5)
MCV RBC AUTO: 91 FL (ref 78–100)
PLATELET # BLD AUTO: 351 10E3/UL (ref 150–450)
RBC # BLD AUTO: 4.49 10E6/UL (ref 3.8–5.2)
TSH SERPL DL<=0.005 MIU/L-ACNC: 1.18 MU/L (ref 0.4–4)
WBC # BLD AUTO: 11.8 10E3/UL (ref 4–11)

## 2023-04-20 PROCEDURE — 83036 HEMOGLOBIN GLYCOSYLATED A1C: CPT

## 2023-04-20 PROCEDURE — 84460 ALANINE AMINO (ALT) (SGPT): CPT

## 2023-04-20 PROCEDURE — 36415 COLL VENOUS BLD VENIPUNCTURE: CPT

## 2023-04-20 PROCEDURE — 85027 COMPLETE CBC AUTOMATED: CPT

## 2023-04-20 PROCEDURE — 84443 ASSAY THYROID STIM HORMONE: CPT

## 2023-04-26 DIAGNOSIS — E11.9 TYPE 2 DIABETES MELLITUS WITHOUT COMPLICATION, WITHOUT LONG-TERM CURRENT USE OF INSULIN (H): Primary | ICD-10-CM

## 2023-04-26 LAB — HBA1C MFR BLD: 7.9 % (ref 0–5.6)

## 2023-04-28 ENCOUNTER — TELEPHONE (OUTPATIENT)
Dept: FAMILY MEDICINE | Facility: CLINIC | Age: 63
End: 2023-04-28
Payer: MEDICARE

## 2023-04-28 DIAGNOSIS — E11.9 TYPE 2 DIABETES MELLITUS WITHOUT COMPLICATION, WITHOUT LONG-TERM CURRENT USE OF INSULIN (H): ICD-10-CM

## 2023-04-28 NOTE — TELEPHONE ENCOUNTER
Please call patient with the following info:    A1c is up to 7.9% is she still using 15 units of insulin?   I'd like her to increase her long acting insulin to 18 units daily. Let's follow up again in 3 months for another A1c, in person visit

## 2023-05-01 RX ORDER — INSULIN DETEMIR 100 [IU]/ML
18 INJECTION, SOLUTION SUBCUTANEOUS AT BEDTIME
Qty: 15 ML | Refills: 0 | Status: SHIPPED | OUTPATIENT
Start: 2023-05-01 | End: 2023-06-09

## 2023-05-01 NOTE — TELEPHONE ENCOUNTER
Spoke with patient using Bosnian , relayed providers(Clara Ambriz) message below and patient verbalized understanding.    Scheduled 3 month follow-up to recheck A1C(08/01/2023 at 11:10 am); patient verbalized understanding.    Elo Melgar RN

## 2023-05-06 DIAGNOSIS — I10 BENIGN ESSENTIAL HYPERTENSION WITH TARGET BLOOD PRESSURE BELOW 140/90: ICD-10-CM

## 2023-05-08 RX ORDER — HYDROCHLOROTHIAZIDE 25 MG/1
25 TABLET ORAL DAILY
Qty: 90 TABLET | Refills: 0 | Status: SHIPPED | OUTPATIENT
Start: 2023-05-08 | End: 2023-11-09

## 2023-05-24 ENCOUNTER — OFFICE VISIT (OUTPATIENT)
Dept: FAMILY MEDICINE | Facility: CLINIC | Age: 63
End: 2023-05-24
Payer: MEDICARE

## 2023-05-24 ENCOUNTER — ANCILLARY PROCEDURE (OUTPATIENT)
Dept: GENERAL RADIOLOGY | Facility: CLINIC | Age: 63
End: 2023-05-24
Attending: PHYSICIAN ASSISTANT
Payer: MEDICARE

## 2023-05-24 VITALS
DIASTOLIC BLOOD PRESSURE: 58 MMHG | SYSTOLIC BLOOD PRESSURE: 98 MMHG | HEIGHT: 65 IN | BODY MASS INDEX: 38.95 KG/M2 | HEART RATE: 93 BPM | TEMPERATURE: 98.5 F | OXYGEN SATURATION: 94 % | RESPIRATION RATE: 16 BRPM | WEIGHT: 233.8 LBS

## 2023-05-24 DIAGNOSIS — M25.531 RIGHT WRIST PAIN: Primary | ICD-10-CM

## 2023-05-24 DIAGNOSIS — M25.531 RIGHT WRIST PAIN: ICD-10-CM

## 2023-05-24 LAB
ERYTHROCYTE [DISTWIDTH] IN BLOOD BY AUTOMATED COUNT: 13 % (ref 10–15)
ERYTHROCYTE [SEDIMENTATION RATE] IN BLOOD BY WESTERGREN METHOD: 16 MM/HR (ref 0–30)
HCT VFR BLD AUTO: 42.7 % (ref 35–47)
HGB BLD-MCNC: 14.1 G/DL (ref 11.7–15.7)
MCH RBC QN AUTO: 29.9 PG (ref 26.5–33)
MCHC RBC AUTO-ENTMCNC: 33 G/DL (ref 31.5–36.5)
MCV RBC AUTO: 91 FL (ref 78–100)
PLATELET # BLD AUTO: 373 10E3/UL (ref 150–450)
RBC # BLD AUTO: 4.72 10E6/UL (ref 3.8–5.2)
WBC # BLD AUTO: 10.5 10E3/UL (ref 4–11)

## 2023-05-24 PROCEDURE — 85652 RBC SED RATE AUTOMATED: CPT | Performed by: PHYSICIAN ASSISTANT

## 2023-05-24 PROCEDURE — 36415 COLL VENOUS BLD VENIPUNCTURE: CPT | Performed by: PHYSICIAN ASSISTANT

## 2023-05-24 PROCEDURE — 84550 ASSAY OF BLOOD/URIC ACID: CPT | Performed by: PHYSICIAN ASSISTANT

## 2023-05-24 PROCEDURE — 86431 RHEUMATOID FACTOR QUANT: CPT | Performed by: PHYSICIAN ASSISTANT

## 2023-05-24 PROCEDURE — 73110 X-RAY EXAM OF WRIST: CPT | Mod: TC | Performed by: RADIOLOGY

## 2023-05-24 PROCEDURE — 86140 C-REACTIVE PROTEIN: CPT | Performed by: PHYSICIAN ASSISTANT

## 2023-05-24 PROCEDURE — 99214 OFFICE O/P EST MOD 30 MIN: CPT | Performed by: PHYSICIAN ASSISTANT

## 2023-05-24 PROCEDURE — 85027 COMPLETE CBC AUTOMATED: CPT | Performed by: PHYSICIAN ASSISTANT

## 2023-05-24 PROCEDURE — 86038 ANTINUCLEAR ANTIBODIES: CPT | Performed by: PHYSICIAN ASSISTANT

## 2023-05-24 PROCEDURE — 86200 CCP ANTIBODY: CPT | Performed by: PHYSICIAN ASSISTANT

## 2023-05-24 RX ORDER — LIDOCAINE 4 G/G
1 PATCH TOPICAL DAILY PRN
Qty: 15 PATCH | Refills: 0 | Status: SHIPPED | OUTPATIENT
Start: 2023-05-24 | End: 2023-11-09

## 2023-05-24 ASSESSMENT — PAIN SCALES - GENERAL: PAINLEVEL: EXTREME PAIN (8)

## 2023-05-24 NOTE — PROGRESS NOTES
Assessment & Plan   Problem List Items Addressed This Visit    None  Visit Diagnoses     Right wrist pain    -  Primary    Relevant Medications    naproxen (NAPROSYN) 375 MG tablet    Lidocaine (LIDOCARE) 4 % Patch    diclofenac (VOLTAREN) 1 % topical gel    Other Relevant Orders    Cyclic Citrullinated Peptide Antibody IgG    Rheumatoid factor    Anti Nuclear Shannon IgG by IFA with Reflex    ESR: Erythrocyte sedimentation rate (Completed)    CRP, inflammation    CBC with platelets (Completed)    Uric acid    Orthopedic  Referral    XR Wrist Right G/E 3 Views (Completed)          Jacob Cai is a 62 year old retired right 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 Doubt fracture/contusion/dislocation given no trauma and repeat XR, this time with navicular views is negative for fracture, but did show degenerative changes. Impression is arthritis from unknown etiology. ESR neg and CBC reassuring. Thumb spica splint reapplied, will tx with analgesics as above, RICE/heat, and ortho FU in 1 week. Other labs pending, but I have low suspicion for septic arthritis or other infectious process at this point.    Complete history and physical exam as below. Afebrile with normal vital signs.    DDx and Dx discussed with and explained to the pt to their satisfaction.  All questions were answered at this time. Pt expressed understanding of and agreement with this dx, tx, and plan. No further workup warranted and standard medication warnings given. I have given the patient a list of pertinent indications for re-evaluation. Will go to the Emergency Department if symptoms worsen or new concerning symptoms arise. Patient left in no apparent distress.     Ordering of each unique test  Prescription drug management     MED REC REQUIRED  Post Medication Reconciliation Status: discharge medications reconciled and changed, per note/orders  BMI:   Estimated  "body mass index is 39.43 kg/m  as calculated from the following:    Height as of this encounter: 1.64 m (5' 4.57\").    Weight as of this encounter: 106.1 kg (233 lb 12.8 oz).     See Patient Instructions    JOSE Last [facility] CASA James is a 62 year old, presenting for the following health issues:  Hospital F/U        5/24/2023    10:11 AM   Additional Questions   Roomed by isaac welsh   Accompanied by no one         5/24/2023    10:11 AM   Patient Reported Additional Medications   Patient reports taking the following new medications none     HPI     Use phone interpretor  Hospital Follow-up Visit:    Hospital/Nursing Home/IP Rehab Facility: [facility]  Date of Admission: 5/21/23  Date of Discharge: 5/21/23  Reason(s) for Admission: Hand started to hurt, around joints    Was your hospitalization related to COVID-19? No   Problems taking medications regularly:  None  Medication changes since discharge: Ibu, Tylenol  Problems adhering to non-medication therapy:  None    Summary of hospitalization:  [facility] information obtained and reviewed. Seen in the ER 3 days ago for atraumatic right wrist pain for 2 days.  X-ray showed no fracture, dislocation or other worrisome acute issue.  She has been using the vacuum in the days leading up to this.  It was recommended that she use over-the-counter analgesics at home, RICE and follow up here.  Diagnostic Tests/Treatments reviewed.  Follow up needed: orthopedics  Other Healthcare Providers Involved in Patient s Care:         None  Update since discharge: improved marginally    Plan of care communicated with patient    Review of Systems   Constitutional, HEENT, cardiovascular, pulmonary, gi and gu systems are negative, except as otherwise noted.      Objective    BP 98/58   Pulse 93   Temp 98.5  F (36.9  C) (Tympanic)   Resp 16   Ht 1.64 m (5' 4.57\")   Wt 106.1 kg (233 lb 12.8 oz)   LMP  (LMP Unknown)   SpO2 94%   " Breastfeeding No   BMI 39.43 kg/m    Body mass index is 39.43 kg/m .  Physical Exam  Vitals and nursing note reviewed.   Constitutional:       General: She is not in acute distress.     Appearance: Normal appearance. She is not diaphoretic.   HENT:      Head: Normocephalic and atraumatic.      Nose: Nose normal.   Eyes:      Conjunctiva/sclera: Conjunctivae normal.   Pulmonary:      Effort: Pulmonary effort is normal. No respiratory distress.   Musculoskeletal:      Comments: RUE: tenderness at snuffbox and distal radius. Mild edema.  No warmth, erythema, crepitus, or other overlying signs of trauma or infection. Distal CMS intact aside from diminished AROM at wrist. Remainder of limb non-tender.    Skin:     General: Skin is dry.      Coloration: Skin is not jaundiced or pale.   Neurological:      General: No focal deficit present.      Mental Status: She is alert. Mental status is at baseline.   Psychiatric:         Mood and Affect: Mood normal.         Behavior: Behavior normal.        Results for orders placed or performed in visit on 05/24/23   XR Wrist Right G/E 3 Views     Status: None    Narrative    EXAM: WRIST RIGHT THREE OR MORE VIEWS  DATE/TIME: 5/24/2023 11:01 AM     INDICATION: Right wrist pain and snuffbox tenderness.   COMPARISON: None.      Impression    IMPRESSION:  1.  No fracture or joint malalignment.  2.  Mild triscaphe degenerative arthrosis.  3.  Moderate thumb IP degenerative arthrosis.  4.  Mild ulna positive variance.       APOORVA GIBSON MD         SYSTEM ID:  [id number]   Results for orders placed or performed in visit on 05/24/23   CBC with platelets     Status: Normal   Result Value Ref Range    WBC Count 10.5 4.0 - 11.0 10e3/uL    RBC Count 4.72 3.80 - 5.20 10e6/uL    Hemoglobin 14.1 11.7 - 15.7 g/dL    Hematocrit 42.7 35.0 - 47.0 %    MCV 91 78 - 100 fL    MCH 29.9 26.5 - 33.0 pg    MCHC 33.0 31.5 - 36.5 g/dL    RDW 13.0 10.0 - 15.0 %    Platelet Count 373 150 - 450 10e3/uL   ESR:  Erythrocyte sedimentation rate     Status: Normal   Result Value Ref Range    Erythrocyte Sedimentation Rate 16 0 - 30 mm/hr

## 2023-05-24 NOTE — PATIENT INSTRUCTIONS
Colette James,    Thank you for allowing [facility] to manage your care.    I am unsure of the cause of your symptoms, but we will see what our workup shows.     If you develop worsening/changing symptoms at any time, please call 911 or go to the emergency department for evaluation.    I ordered some lab work. Please go to the laboratory to get your studies.    I ordered some xrays. Please go to our radiology department to get your xrays.    I sent your prescriptions to your pharmacy.    I made a referral to orthopedics. They will be calling in approximately 1 week to set up your appointment.  If you do not hear from them, please call the specialty number on your after visit summary.     Please allow 1-2 business days for our office to contact you in regards to your laboratory/radiological studies.  If not done so, I encourage you to login into [employer] ([email or web address] to review your results as well.     If you have any questions or concerns, please feel free to call us at [phone]    Sincerely,    Julio Cesar Marcial PA-C    Did you know?      You can schedule a video visit for follow-up appointments as well as future appointments for certain conditions.  Please see the below link.     [email or web address]    If you have not already done so,  I encourage you to sign up for [email or web address] ([email or web address]  This will allow you to review your results, securely communicate with a provider, and schedule virtual visits as well.

## 2023-05-24 NOTE — LETTER
May 26, 2023      Jacob Cai  [address]  [place] [address]        Dear ,    We are writing to inform you of your test results.    Your labs are overall very reassuring. Your CRP, which is one of the inflammatory markers is elevated, but everything else looks good. I would like you to see orthopedics if not improving in the next 2 weeks. [facility]  will call you to coordinate your care as prescribed by your provider. A representative will call you within 2 business days to help you schedule your appointment, or you may contact the  Representative at: [phone]. To get a [facility]  any time, call [employer]  [phone] - Available 24 Hours    Resulted Orders   Uric acid   Result Value Ref Range    Uric Acid 5.9 2.6 - 6.0 mg/dL   CBC with platelets   Result Value Ref Range    WBC Count 10.5 4.0 - 11.0 10e3/uL    RBC Count 4.72 3.80 - 5.20 10e6/uL    Hemoglobin 14.1 11.7 - 15.7 g/dL    Hematocrit 42.7 35.0 - 47.0 %    MCV 91 78 - 100 fL    MCH 29.9 26.5 - 33.0 pg    MCHC 33.0 31.5 - 36.5 g/dL    RDW 13.0 10.0 - 15.0 %    Platelet Count 373 150 - 450 10e3/uL   CRP, inflammation   Result Value Ref Range    CRP Inflammation 14.1 (H) 0.0 - 8.0 mg/L   ESR: Erythrocyte sedimentation rate   Result Value Ref Range    Erythrocyte Sedimentation Rate 16 0 - 30 mm/hr   Anti Nuclear Shannon IgG by IFA with Reflex   Result Value Ref Range    JUAN CARLOS interpretation Negative Negative      Comment:        Negative:              <1:40  Borderline Positive:   1:40 - 1:80  Positive:              >1:80   Rheumatoid factor   Result Value Ref Range    Rheumatoid Factor <7 <12 IU/mL       If you have any questions or concerns, please call the clinic at the number listed above.       Sincerely,      JOSE Last

## 2023-05-24 NOTE — LETTER
May 30, 2023      Jeramiehisander Trell  [address]  [place] [facility] [address]        Dear ,    We are writing to inform you of your test results.    {results letter list:[id number]}    Resulted Orders   Uric acid   Result Value Ref Range    Uric Acid 5.9 2.6 - 6.0 mg/dL   CBC with platelets   Result Value Ref Range    WBC Count 10.5 4.0 - 11.0 10e3/uL    RBC Count 4.72 3.80 - 5.20 10e6/uL    Hemoglobin 14.1 11.7 - 15.7 g/dL    Hematocrit 42.7 35.0 - 47.0 %    MCV 91 78 - 100 fL    MCH 29.9 26.5 - 33.0 pg    MCHC 33.0 31.5 - 36.5 g/dL    RDW 13.0 10.0 - 15.0 %    Platelet Count 373 150 - 450 10e3/uL   CRP, inflammation   Result Value Ref Range    CRP Inflammation 14.1 (H) 0.0 - 8.0 mg/L   ESR: Erythrocyte sedimentation rate   Result Value Ref Range    Erythrocyte Sedimentation Rate 16 0 - 30 mm/hr   Anti Nuclear Shannon IgG by IFA with Reflex   Result Value Ref Range    JUAN CARLOS interpretation Negative Negative      Comment:        Negative:              <1:40  Borderline Positive:   1:40 - 1:80  Positive:              >1:80   Rheumatoid factor   Result Value Ref Range    Rheumatoid Factor <7 <12 IU/mL   Cyclic Citrullinated Peptide Antibody IgG   Result Value Ref Range    Cyclic Citrullinated Peptide Antibody IgG 1.0 <7.0 U/mL      Comment:      Negative       If you have any questions or concerns, please call the clinic at the number listed above.       Sincerely,      JOSE Last

## 2023-05-25 LAB
ANA SER QL IF: NEGATIVE
CRP SERPL-MCNC: 14.1 MG/L (ref 0–8)
RHEUMATOID FACT SER NEPH-ACNC: <7 IU/ML
URATE SERPL-MCNC: 5.9 MG/DL (ref 2.6–6)

## 2023-05-27 LAB — CCP AB SER IA-ACNC: 1 U/ML

## 2023-07-03 ENCOUNTER — ANCILLARY ORDERS (OUTPATIENT)
Dept: FAMILY MEDICINE | Facility: CLINIC | Age: 63
End: 2023-07-03

## 2023-07-03 DIAGNOSIS — Z12.31 VISIT FOR SCREENING MAMMOGRAM: ICD-10-CM

## 2023-07-20 ENCOUNTER — ANCILLARY PROCEDURE (OUTPATIENT)
Dept: MAMMOGRAPHY | Facility: CLINIC | Age: 63
End: 2023-07-20
Attending: PHYSICIAN ASSISTANT
Payer: MEDICARE

## 2023-07-20 ENCOUNTER — ANCILLARY ORDERS (OUTPATIENT)
Dept: FAMILY MEDICINE | Facility: CLINIC | Age: 63
End: 2023-07-20

## 2023-07-20 DIAGNOSIS — R92.8 ABNORMAL MAMMOGRAM: Primary | ICD-10-CM

## 2023-07-20 DIAGNOSIS — Z12.31 VISIT FOR SCREENING MAMMOGRAM: ICD-10-CM

## 2023-07-20 PROCEDURE — 77067 SCR MAMMO BI INCL CAD: CPT | Mod: TC | Performed by: RADIOLOGY

## 2023-07-24 DIAGNOSIS — E11.9 TYPE 2 DIABETES MELLITUS WITHOUT COMPLICATION, WITHOUT LONG-TERM CURRENT USE OF INSULIN (H): ICD-10-CM

## 2023-07-24 RX ORDER — INSULIN DETEMIR 100 [IU]/ML
INJECTION, SOLUTION SUBCUTANEOUS
Qty: 15 ML | Refills: 0 | Status: SHIPPED | OUTPATIENT
Start: 2023-07-24 | End: 2023-08-01

## 2023-07-24 RX ORDER — PEN NEEDLE, DIABETIC 31 GX3/16"
NEEDLE, DISPOSABLE MISCELLANEOUS
Qty: 100 EACH | Refills: 4 | Status: SHIPPED | OUTPATIENT
Start: 2023-07-24 | End: 2023-09-19

## 2023-08-01 ENCOUNTER — OFFICE VISIT (OUTPATIENT)
Dept: FAMILY MEDICINE | Facility: CLINIC | Age: 63
End: 2023-08-01
Payer: MEDICARE

## 2023-08-01 VITALS
SYSTOLIC BLOOD PRESSURE: 124 MMHG | HEART RATE: 75 BPM | TEMPERATURE: 97.8 F | WEIGHT: 231 LBS | HEIGHT: 65 IN | BODY MASS INDEX: 38.49 KG/M2 | OXYGEN SATURATION: 95 % | DIASTOLIC BLOOD PRESSURE: 66 MMHG

## 2023-08-01 DIAGNOSIS — Z12.11 SCREEN FOR COLON CANCER: ICD-10-CM

## 2023-08-01 DIAGNOSIS — E11.9 TYPE 2 DIABETES MELLITUS WITHOUT COMPLICATION, WITHOUT LONG-TERM CURRENT USE OF INSULIN (H): Primary | ICD-10-CM

## 2023-08-01 DIAGNOSIS — E78.5 HYPERLIPIDEMIA LDL GOAL <100: ICD-10-CM

## 2023-08-01 LAB — HBA1C MFR BLD: 7.3 % (ref 0–5.6)

## 2023-08-01 PROCEDURE — 36415 COLL VENOUS BLD VENIPUNCTURE: CPT | Performed by: PHYSICIAN ASSISTANT

## 2023-08-01 PROCEDURE — 99214 OFFICE O/P EST MOD 30 MIN: CPT | Performed by: PHYSICIAN ASSISTANT

## 2023-08-01 PROCEDURE — 83036 HEMOGLOBIN GLYCOSYLATED A1C: CPT | Performed by: PHYSICIAN ASSISTANT

## 2023-08-01 RX ORDER — ATORVASTATIN CALCIUM 20 MG/1
20 TABLET, FILM COATED ORAL DAILY
Qty: 90 TABLET | Refills: 1 | Status: SHIPPED | OUTPATIENT
Start: 2023-08-01 | End: 2024-04-16

## 2023-08-01 RX ORDER — INSULIN DETEMIR 100 [IU]/ML
21 INJECTION, SOLUTION SUBCUTANEOUS AT BEDTIME
Qty: 15 ML | Refills: 1 | Status: SHIPPED | OUTPATIENT
Start: 2023-08-01 | End: 2023-11-06

## 2023-08-01 NOTE — PROGRESS NOTES
"  Assessment & Plan       ICD-10-CM    1. Type 2 diabetes mellitus without complication, without long-term current use of insulin (H)  E11.9 Hemoglobin A1c     Hemoglobin A1c     insulin detemir (LEVEMIR FLEXPEN/FLEXTOUCH) 100 UNIT/ML pen     CANCELED: Hemoglobin A1c      2. Hyperlipidemia LDL goal <100  E78.5 atorvastatin (LIPITOR) 20 MG tablet     CANCELED: Hepatic function panel     CANCELED: Lipid panel reflex to direct LDL Fasting      3. Screen for colon cancer  Z12.11 Fecal colorectal cancer screen FIT - Future (S+30)          1) A1c has improved and is down to 7.3%. Increase insulin by 2 units, up to 21 units daily. Recheck an A1c in 3 months.    2) Will switch to atorvastatin 20mg daily. Recheck lipids and LFTs in 3 months    Patient Instructions   Increase your insulin to 21 units daily.  Stop simvastatin and switch to atorvastatin.   Follow up in 3 months for a lab appointment to recheck your A1c.  Follow up with Clara howard in the clinic in 6 months.    Povecajte inzulin na 21 jedinicu dnevno.  Zaustavite simvastatin i predite na atorvastatin.  Zaka ite laboratorijski pregled za 3 mjeseca kako biste ponovo provjerili svoj A1c.  Nastavite sa Clara ponovo u klinici za 6 mjeseci.     Ordering of each unique test  Prescription drug management         BMI:   Estimated body mass index is 39.04 kg/m  as calculated from the following:    Height as of this encounter: 1.638 m (5' 4.5\").    Weight as of this encounter: 104.8 kg (231 lb).     Return in about 3 months (around 11/1/2023) for fasting labs, lab only appointment ok.     Clara Ambriz PA-C  [facility] CASA James is a 62 year old, presenting for the following health issues:  Diabetes, Hypertension, Lipids, and Depression        8/1/2023    11:29 AM   Additional Questions   Roomed by Vicki BARCENAS   Accompanied by Self       HPI     Diabetes Follow-up    How often are you checking your blood sugar? Every morning  What " "concerns do you have today about your diabetes? None and Blood sugar is often over 200   Do you have any of these symptoms? (Select all that apply)  Numbness in feet and Burning in feet  Have you had a diabetic eye exam in the last 12 months? No          Review of Systems   Constitutional, endocrine systems are negative, except as otherwise noted.      Objective    /66 (BP Location: Right arm, Patient Position: Chair, Cuff Size: Adult Large)   Pulse 75   Temp 97.8  F (36.6  C) (Oral)   Ht 1.638 m (5' 4.5\")   Wt 104.8 kg (231 lb)   LMP  (LMP Unknown)   SpO2 95%   BMI 39.04 kg/m    Body mass index is 39.04 kg/m .  Physical Exam   GENERAL: healthy, alert and no distress  MS: no gross musculoskeletal defects noted, no edema  SKIN: no suspicious lesions or rashes  NEURO: Normal strength and tone, mentation intact and speech normal  PSYCH: mentation appears normal, affect normal/bright    Results for orders placed or performed in visit on 08/01/23 (from the past 24 hour(s))   Hemoglobin A1c   Result Value Ref Range    Hemoglobin A1C 7.3 (H) 0.0 - 5.6 %                   "

## 2023-08-01 NOTE — PATIENT INSTRUCTIONS
Increase your insulin to 21 units daily.  Stop simvastatin and switch to atorvastatin.   Follow up in 3 months for a lab appointment to recheck your A1c.  Follow up with Clara back in the clinic in 6 months.    Poyasmanyalilyte inzulin na 21 jedinicu dnevno.  Zaustavite simvastatin i predite na atorvastatin.  Mike adair pregled za 3 mjeseca rosita welsholily A1c.  Alonzotavite sa Clara longo za 6 mjeseci.

## 2023-08-04 ENCOUNTER — ANCILLARY PROCEDURE (OUTPATIENT)
Dept: MAMMOGRAPHY | Facility: CLINIC | Age: 63
End: 2023-08-04
Attending: PHYSICIAN ASSISTANT
Payer: MEDICARE

## 2023-08-04 ENCOUNTER — ANCILLARY PROCEDURE (OUTPATIENT)
Dept: ULTRASOUND IMAGING | Facility: CLINIC | Age: 63
End: 2023-08-04
Attending: PHYSICIAN ASSISTANT
Payer: MEDICARE

## 2023-08-04 ENCOUNTER — ANCILLARY ORDERS (OUTPATIENT)
Dept: FAMILY MEDICINE | Facility: CLINIC | Age: 63
End: 2023-08-04

## 2023-08-04 DIAGNOSIS — R92.8 ABNORMAL MAMMOGRAM: ICD-10-CM

## 2023-08-04 DIAGNOSIS — R92.8 ABNORMAL MAMMOGRAM: Primary | ICD-10-CM

## 2023-08-04 PROCEDURE — 76642 ULTRASOUND BREAST LIMITED: CPT | Mod: LT

## 2023-08-04 PROCEDURE — G0279 TOMOSYNTHESIS, MAMMO: HCPCS

## 2023-08-04 PROCEDURE — 77065 DX MAMMO INCL CAD UNI: CPT | Mod: LT

## 2023-08-11 ENCOUNTER — ANCILLARY PROCEDURE (OUTPATIENT)
Dept: MAMMOGRAPHY | Facility: CLINIC | Age: 63
End: 2023-08-11
Attending: PHYSICIAN ASSISTANT
Payer: MEDICARE

## 2023-08-11 ENCOUNTER — ANCILLARY PROCEDURE (OUTPATIENT)
Dept: ULTRASOUND IMAGING | Facility: CLINIC | Age: 63
End: 2023-08-11
Attending: PHYSICIAN ASSISTANT
Payer: MEDICARE

## 2023-08-11 DIAGNOSIS — R92.8 ABNORMAL MAMMOGRAM: ICD-10-CM

## 2023-08-11 PROCEDURE — 88341 IMHCHEM/IMCYTCHM EA ADD ANTB: CPT | Performed by: PATHOLOGY

## 2023-08-11 PROCEDURE — 88305 TISSUE EXAM BY PATHOLOGIST: CPT | Performed by: PATHOLOGY

## 2023-08-11 PROCEDURE — 88342 IMHCHEM/IMCYTCHM 1ST ANTB: CPT | Performed by: PATHOLOGY

## 2023-08-11 PROCEDURE — 19083 BX BREAST 1ST LESION US IMAG: CPT | Mod: LT

## 2023-08-16 LAB
PATH REPORT.COMMENTS IMP SPEC: NORMAL
PATH REPORT.COMMENTS IMP SPEC: NORMAL
PATH REPORT.FINAL DX SPEC: NORMAL
PATH REPORT.GROSS SPEC: NORMAL
PATH REPORT.MICROSCOPIC SPEC OTHER STN: NORMAL
PATH REPORT.RELEVANT HX SPEC: NORMAL
PHOTO IMAGE: NORMAL

## 2023-08-17 ENCOUNTER — PATIENT OUTREACH (OUTPATIENT)
Dept: ONCOLOGY | Facility: CLINIC | Age: 63
End: 2023-08-17
Payer: MEDICARE

## 2023-08-17 ENCOUNTER — TELEPHONE (OUTPATIENT)
Dept: GENERAL RADIOLOGY | Facility: CLINIC | Age: 63
End: 2023-08-17
Payer: MEDICARE

## 2023-08-17 DIAGNOSIS — D24.2 INTRADUCTAL PAPILLOMA OF LEFT BREAST: Primary | ICD-10-CM

## 2023-08-17 NOTE — TELEPHONE ENCOUNTER
Spoke with patient with a Ana María  regarding left breast biopsy results, which indicate atypical intraductal papilloma, negative for malignancy. Notified patient that the radiologist's recommendation is surgical consultation to discuss removal of papilloma. Referral placed. Informed patient that the surgery clinic will be calling her to set up an appointment. Patient verbalized understanding of these results and all questions answered to her satisfaction.

## 2023-08-17 NOTE — PROGRESS NOTES
New Patient Oncology Nurse Navigator Note     Referring provider: Clara Ambriz PA-C     Referring Clinic/Organization: [facility]     Referred to (specialty:) Cancer Surgery     Requested provider (if applicable): NA     Date Referral Received: August 17, 2023     Evaluation for:  Benign breast condition  D24.2 (ICD-10-CM) - Intraductal papilloma of left breast      Clinical History (per Nurse review of records provided):      7/20/23 Screening Mammogram: The breasts have scattered areas of fibroglandular density. There is a possible asymmetry in the left breast at the 2 o'clock position, posterior depth. The remainder of the breast tissue is unremarkable.- There is no suspicious finding of the right breast.    8/04/23 Diagnostic Mammogram and Ultrasound:  FINDINGS:  BREAST DENSITY: Scattered fibroglandular densities.  Additional mammogram views obtained to evaluate the possible asymmetry  in the left breast 2:00 position posterior depth, which confirms an  approximately 8 mm partially obscured irregular equal density mass.     Targeted real-time ultrasound evaluation performed by the technologist  and radiologist.  In the corresponding left breast 2:00 position 7 cm from the nipple  there is a 0.8 x 0.8 x 0.5 cm hypoechoic irregular mass with  microlobulated margins.    Biopsy done 8/11/23:   Final Diagnosis   LEFT breast, 2:00, 7 cm from nipple, ultrasound guided core biopsy:  -Intraductal papilloma with focal atypical ductal hyperplasia (atypical intraductal papilloma)  -Occasional luminal calcifications in the intraductal papilloma  -Negative for malignancy  -See microscopic description   Electronically signed by Megha Hernandez MD on 8/16/2023 at  3:43 PM   Clinical Information  UUMAYO   The patient is a 62-year-old woman, with a LEFT breast asymmetry detected by screening mammogram, and further characterized by diagnostic mammogram and ultrasound, which show an 8 mm  "RIGHT breast mass at 2:00, 7 cm from the nipple. Procedure: LEFT breast ultrasound guided core biopsy (Q-shaped biopsy marker placed).    Gross Description  RAYO SHELDON(1). Breast, Left, Left breast 2:00 7cm from nipple:  The specimen is received in formalin with proper patient identification, labeled \"left breast 2:00 7 cm FN\".  It consists of 7 yellow-tan tissue cores ranging from 0.2 to 0.8 cm in length and averaging 0.1 cm in diameter.  It is wrapped and entirely submitted in cassette A1.     The specimen is collected and placed in formalin at 1115 on 8/11/23.           Microscopic Description  RAYO   Microscopic examination is performed. Estrogen receptor (ER) and cytokeratin 5 (CK 5) immunostains (block A1) are examined with appropriate positive and negative controls. Much of the hyperplastic epithelium in the papilloma shows heterogeneous expression of ER and retained (mosaic) expression of CK 5, supporting a diagnosis of usual ductal hyperplasia. Focally, the hyperplastic epithelium shows diffuse, strong expression of ER, and lost expression of CK 5, supporting a diagnosis of atypical ductal hyperplasia.      I have personally reviewed all specimens and or slides, including the listed special stains, and used them with my medical judgement to determine the final diagnosis.         Records Location: See Bookmarked material     Records Needed: NA     Additional testing needed prior to consult: NA    Payor: MEDICARE / Plan: MEDICARE / Product Type: Medicare /     August 17, 2023    Called patient with the use of a [employer]  ID number [id number] to introduced myself and role as nurse navigator with [facility] Hematology/Oncology department and to inform them that we have received the referral for a diagnosis of Intraductal Papilloma of the left breast from Clara Ambriz PA-C. Patient confirms they are aware of the referral and ready to schedule. Provided them with contact information for NPS and " encourage them to call with any questions. Patient verbalized understanding of plan. Patient transferred to NPS to schedule.     Melissa Larose, RN, BSN  [facility] Hematology/Oncology Nurse Navigator  [phone]

## 2023-08-22 ENCOUNTER — OFFICE VISIT (OUTPATIENT)
Dept: SURGERY | Facility: CLINIC | Age: 63
End: 2023-08-22
Attending: PHYSICIAN ASSISTANT
Payer: MEDICARE

## 2023-08-22 VITALS — BODY MASS INDEX: 38.49 KG/M2 | WEIGHT: 231 LBS | RESPIRATION RATE: 16 BRPM | HEIGHT: 65 IN

## 2023-08-22 DIAGNOSIS — D24.2 INTRADUCTAL PAPILLOMA OF LEFT BREAST: ICD-10-CM

## 2023-08-22 PROCEDURE — 99204 OFFICE O/P NEW MOD 45 MIN: CPT | Performed by: SPECIALIST

## 2023-08-22 PROCEDURE — G0463 HOSPITAL OUTPT CLINIC VISIT: HCPCS | Performed by: SPECIALIST

## 2023-08-22 NOTE — H&P (VIEW-ONLY)
This is a 62 year old woman who I'm asked to see by Clara Ambriz for evaluation of  left breast lesion.  This was a density picked up on mammogram.  She underwent a needle biopsy which shows a papillary lesion with atypia.  She has no palpable mass that she can feel.  She has no family history of breast cancer.    She had a previous breast biopsy on the right many years ago in [facility].  That was negative.    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    HYSTERECTOMY, PAP NO LONGER INDICATED      HYSTERECTOMY, [facility]  8/2006         Current Outpatient Medications:     alcohol swab prep pads, Use to swab area of injection/elvia as directed, Disp: 100 each, Rfl: 3    amLODIPine (NORVASC) 10 MG tablet, Take 1 tablet (10 mg) by mouth daily, Disp: 90 tablet, Rfl: 3    aspirin (ASA) 81 MG EC tablet, Take 81 mg by mouth every 24 hours, Disp: , Rfl:     atorvastatin (LIPITOR) 20 MG tablet, Take 1 tablet (20 mg) by mouth daily - stop simvastatin 8/1/23, Disp: 90 tablet, Rfl: 1    blood glucose (NO BRAND SPECIFIED) test strip, Use to test blood sugar 1 times daily or as directed., Disp: 300 strip, Rfl: 4    diclofenac (VOLTAREN) 1 % topical gel, Apply 2 g topically 4 times daily as needed for moderate pain, Disp: 50 g, Rfl: 0    DULoxetine (CYMBALTA) 60 MG capsule, Take 1 capsule (60 mg) by mouth daily, Disp: 90 capsule, Rfl: 1    glipiZIDE (GLUCOTROL XL) 10 MG 24 hr tablet, Take 2 tablets (20 mg) by mouth daily, Disp: 180 tablet, Rfl: 0    hydrochlorothiazide (HYDRODIURIL) 25 MG tablet, TAKE 1 TABLET (25 MG) BY MOUTH DAILY (Patient not taking: Reported on 5/24/2023), Disp: 90 tablet, Rfl: 0    hydrochlorothiazide (HYDRODIURIL) 50 MG tablet, Take 1 tablet (50 mg) by mouth daily, Disp: 90 tablet, Rfl: 0    insulin detemir (LEVEMIR FLEXPEN/FLEXTOUCH) 100 UNIT/ML pen, Inject 21 Units  "Subcutaneous At Bedtime - dose increase 8/1/23, Disp: 15 mL, Rfl: 1    insulin pen needle (EASY TOUCH PEN NEEDLES) 31G X 5 MM miscellaneous, USE PEN NEEDLES DAILY OR AS DIRECTED., Disp: 100 each, Rfl: 4    Lidocaine (LIDOCARE) 4 % Patch, Place 1 patch onto the skin daily as needed for moderate pain To prevent lidocaine toxicity, patient should be patch free for 12 hrs daily., Disp: 15 patch, Rfl: 0    liraglutide (VICTOZA PEN) 18 MG/3ML solution, Inject 1.8 mg Subcutaneous daily, Disp: 27 mL, Rfl: 0    lisinopril (ZESTRIL) 40 MG tablet, Take 1 tablet (40 mg) by mouth daily, Disp: 90 tablet, Rfl: 1    metFORMIN (GLUCOPHAGE XR) 500 MG 24 hr tablet, Take 2 tablets (1,000 mg) by mouth 2 times daily (with meals), Disp: 360 tablet, Rfl: 0    metoprolol succinate ER (TOPROL XL) 100 MG 24 hr tablet, Take 1 tablet (100 mg) by mouth daily, Disp: 90 tablet, Rfl: 0    sertraline (ZOLOFT) 100 MG tablet, Take 2 tablets (200 mg) by mouth daily, Disp: 180 tablet, Rfl: 0    Allergies   Allergen Reactions    Nkda [No Known Drug Allergy]        Social History     Tobacco Use    Smoking status: Some Days     Types: Cigarettes    Smokeless tobacco: Never   Vaping Use    Vaping Use: Never used   Substance Use Topics    Alcohol use: No    Drug use: No       Pertinent items are noted in HPI.    Physical exam:  Resp 16   Ht 1.638 m (5' 4.5\")   Wt 104.8 kg (231 lb)   LMP  (LMP Unknown)   BMI 39.04 kg/m      General: alert, appears stated age, cooperative, and morbidly obese  Lungs: Good diaphragmatic excursion. Lungs clear.     CV: regular rate and rhythm  Breast: breasts symmetric, no dominant or suspicious mass, or no skin or nipple changes  Axilla: no adenopathy      Imaging: Her mammogram and ultrasound were reviewed.    Impression: Papillary lesion with atypia of the left  breast.  Further excision is indicated to rule out DCIS or invasive  carcinoma. Risks and benefits of surgery explained and they wish to proceed.  All questions " answered.  Did explain that even if the further excision is negative, just having this is an initial diagnosis does put her at higher risk for developing breast cancer in the future.    Plan: Left breast biopsy with wire localization.  Typically an outpatient procedure under local MAC anesthetic.  Follow-up with me after surgery can be as needed if pathology shows anything of concern or if she has any problems with surgery.  Assuming the pathology is negative, she will still need to continue with yearly mammograms.

## 2023-08-22 NOTE — PROGRESS NOTES
This is a 62 year old woman who I'm asked to see by Clara Ambriz for evaluation of  left breast lesion.  This was a density picked up on mammogram.  She underwent a needle biopsy which shows a papillary lesion with atypia.  She has no palpable mass that she can feel.  She has no family history of breast cancer.    She had a previous breast biopsy on the right many years ago in [facility].  That was negative.    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    HYSTERECTOMY, PAP NO LONGER INDICATED      HYSTERECTOMY, [facility]  8/2006         Current Outpatient Medications:     alcohol swab prep pads, Use to swab area of injection/elvia as directed, Disp: 100 each, Rfl: 3    amLODIPine (NORVASC) 10 MG tablet, Take 1 tablet (10 mg) by mouth daily, Disp: 90 tablet, Rfl: 3    aspirin (ASA) 81 MG EC tablet, Take 81 mg by mouth every 24 hours, Disp: , Rfl:     atorvastatin (LIPITOR) 20 MG tablet, Take 1 tablet (20 mg) by mouth daily - stop simvastatin 8/1/23, Disp: 90 tablet, Rfl: 1    blood glucose (NO BRAND SPECIFIED) test strip, Use to test blood sugar 1 times daily or as directed., Disp: 300 strip, Rfl: 4    diclofenac (VOLTAREN) 1 % topical gel, Apply 2 g topically 4 times daily as needed for moderate pain, Disp: 50 g, Rfl: 0    DULoxetine (CYMBALTA) 60 MG capsule, Take 1 capsule (60 mg) by mouth daily, Disp: 90 capsule, Rfl: 1    glipiZIDE (GLUCOTROL XL) 10 MG 24 hr tablet, Take 2 tablets (20 mg) by mouth daily, Disp: 180 tablet, Rfl: 0    hydrochlorothiazide (HYDRODIURIL) 25 MG tablet, TAKE 1 TABLET (25 MG) BY MOUTH DAILY (Patient not taking: Reported on 5/24/2023), Disp: 90 tablet, Rfl: 0    hydrochlorothiazide (HYDRODIURIL) 50 MG tablet, Take 1 tablet (50 mg) by mouth daily, Disp: 90 tablet, Rfl: 0    insulin detemir (LEVEMIR FLEXPEN/FLEXTOUCH) 100 UNIT/ML pen, Inject 21 Units  "Subcutaneous At Bedtime - dose increase 8/1/23, Disp: 15 mL, Rfl: 1    insulin pen needle (EASY TOUCH PEN NEEDLES) 31G X 5 MM miscellaneous, USE PEN NEEDLES DAILY OR AS DIRECTED., Disp: 100 each, Rfl: 4    Lidocaine (LIDOCARE) 4 % Patch, Place 1 patch onto the skin daily as needed for moderate pain To prevent lidocaine toxicity, patient should be patch free for 12 hrs daily., Disp: 15 patch, Rfl: 0    liraglutide (VICTOZA PEN) 18 MG/3ML solution, Inject 1.8 mg Subcutaneous daily, Disp: 27 mL, Rfl: 0    lisinopril (ZESTRIL) 40 MG tablet, Take 1 tablet (40 mg) by mouth daily, Disp: 90 tablet, Rfl: 1    metFORMIN (GLUCOPHAGE XR) 500 MG 24 hr tablet, Take 2 tablets (1,000 mg) by mouth 2 times daily (with meals), Disp: 360 tablet, Rfl: 0    metoprolol succinate ER (TOPROL XL) 100 MG 24 hr tablet, Take 1 tablet (100 mg) by mouth daily, Disp: 90 tablet, Rfl: 0    sertraline (ZOLOFT) 100 MG tablet, Take 2 tablets (200 mg) by mouth daily, Disp: 180 tablet, Rfl: 0    Allergies   Allergen Reactions    Nkda [No Known Drug Allergy]        Social History     Tobacco Use    Smoking status: Some Days     Types: Cigarettes    Smokeless tobacco: Never   Vaping Use    Vaping Use: Never used   Substance Use Topics    Alcohol use: No    Drug use: No       Pertinent items are noted in HPI.    Physical exam:  Resp 16   Ht 1.638 m (5' 4.5\")   Wt 104.8 kg (231 lb)   LMP  (LMP Unknown)   BMI 39.04 kg/m      General: alert, appears stated age, cooperative, and morbidly obese  Lungs: Good diaphragmatic excursion. Lungs clear.     CV: regular rate and rhythm  Breast: breasts symmetric, no dominant or suspicious mass, or no skin or nipple changes  Axilla: no adenopathy      Imaging: Her mammogram and ultrasound were reviewed.    Impression: Papillary lesion with atypia of the left  breast.  Further excision is indicated to rule out DCIS or invasive  carcinoma. Risks and benefits of surgery explained and they wish to proceed.  All questions " answered.  Did explain that even if the further excision is negative, just having this is an initial diagnosis does put her at higher risk for developing breast cancer in the future.    Plan: Left breast biopsy with wire localization.  Typically an outpatient procedure under local MAC anesthetic.  Follow-up with me after surgery can be as needed if pathology shows anything of concern or if she has any problems with surgery.  Assuming the pathology is negative, she will still need to continue with yearly mammograms.

## 2023-08-22 NOTE — NURSING NOTE
Jacob presents to [facility] for a surgical consult with Dr. Gruber  regarding a left breast papilloma.  A Ana María  on IPAD is present for consult.  Patient had imaging and biopsy done, see reports for details.  RN assessment and EMR update.  Patient met with Dr. Gruber .  See dictation for details of visit. She will plan wire loc surgical excision of lesion.  Pre and post op teaching, written and verbal, provided to patient.  Walked her to Amanda/Michael for surgery scheduling.  Follow up pending biopsy results.

## 2023-08-22 NOTE — LETTER
8/22/2023         RE: Jacob Cai  [address] [facility] [address]        Dear Colleague,    Thank you for referring your patient, Jacob Cai, to the [facility] [place]. Please see a copy of my visit note below.    This is a 62 year old woman who I'm asked to see by Clara Ambriz for evaluation of  left breast lesion.  This was a density picked up on mammogram.  She underwent a needle biopsy which shows a papillary lesion with atypia.  She has no palpable mass that she can feel.  She has no family history of breast cancer.    She had a previous breast biopsy on the right many years ago in [facility].  That was negative.    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     HYSTERECTOMY, PAP NO LONGER INDICATED       HYSTERECTOMY, [facility]  8/2006         Current Outpatient Medications:      alcohol swab prep pads, Use to swab area of injection/elvia as directed, Disp: 100 each, Rfl: 3     amLODIPine (NORVASC) 10 MG tablet, Take 1 tablet (10 mg) by mouth daily, Disp: 90 tablet, Rfl: 3     aspirin (ASA) 81 MG EC tablet, Take 81 mg by mouth every 24 hours, Disp: , Rfl:      atorvastatin (LIPITOR) 20 MG tablet, Take 1 tablet (20 mg) by mouth daily - stop simvastatin 8/1/23, Disp: 90 tablet, Rfl: 1     blood glucose (NO BRAND SPECIFIED) test strip, Use to test blood sugar 1 times daily or as directed., Disp: 300 strip, Rfl: 4     diclofenac (VOLTAREN) 1 % topical gel, Apply 2 g topically 4 times daily as needed for moderate pain, Disp: 50 g, Rfl: 0     DULoxetine (CYMBALTA) 60 MG capsule, Take 1 capsule (60 mg) by mouth daily, Disp: 90 capsule, Rfl: 1     glipiZIDE (GLUCOTROL XL) 10 MG 24 hr tablet, Take 2 tablets (20 mg) by mouth daily, Disp: 180 tablet, Rfl: 0     hydrochlorothiazide (HYDRODIURIL) 25 MG tablet, TAKE 1 TABLET (25 MG) BY  "MOUTH DAILY (Patient not taking: Reported on 5/24/2023), Disp: 90 tablet, Rfl: 0     hydrochlorothiazide (HYDRODIURIL) 50 MG tablet, Take 1 tablet (50 mg) by mouth daily, Disp: 90 tablet, Rfl: 0     insulin detemir (LEVEMIR FLEXPEN/FLEXTOUCH) 100 UNIT/ML pen, Inject 21 Units Subcutaneous At Bedtime - dose increase 8/1/23, Disp: 15 mL, Rfl: 1     insulin pen needle (EASY TOUCH PEN NEEDLES) 31G X 5 MM miscellaneous, USE PEN NEEDLES DAILY OR AS DIRECTED., Disp: 100 each, Rfl: 4     Lidocaine (LIDOCARE) 4 % Patch, Place 1 patch onto the skin daily as needed for moderate pain To prevent lidocaine toxicity, patient should be patch free for 12 hrs daily., Disp: 15 patch, Rfl: 0     liraglutide (VICTOZA PEN) 18 MG/3ML solution, Inject 1.8 mg Subcutaneous daily, Disp: 27 mL, Rfl: 0     lisinopril (ZESTRIL) 40 MG tablet, Take 1 tablet (40 mg) by mouth daily, Disp: 90 tablet, Rfl: 1     metFORMIN (GLUCOPHAGE XR) 500 MG 24 hr tablet, Take 2 tablets (1,000 mg) by mouth 2 times daily (with meals), Disp: 360 tablet, Rfl: 0     metoprolol succinate ER (TOPROL XL) 100 MG 24 hr tablet, Take 1 tablet (100 mg) by mouth daily, Disp: 90 tablet, Rfl: 0     sertraline (ZOLOFT) 100 MG tablet, Take 2 tablets (200 mg) by mouth daily, Disp: 180 tablet, Rfl: 0    Allergies   Allergen Reactions     Nkda [No Known Drug Allergy]        Social History     Tobacco Use     Smoking status: Some Days     Types: Cigarettes     Smokeless tobacco: Never   Vaping Use     Vaping Use: Never used   Substance Use Topics     Alcohol use: No     Drug use: No       Pertinent items are noted in HPI.    Physical exam:  Resp 16   Ht 1.638 m (5' 4.5\")   Wt 104.8 kg (231 lb)   LMP  (LMP Unknown)   BMI 39.04 kg/m      General: alert, appears stated age, cooperative, and morbidly obese  Lungs: Good diaphragmatic excursion. Lungs clear.     CV: regular rate and rhythm  Breast: breasts symmetric, no dominant or suspicious mass, or no skin or nipple changes  Axilla: no " adenopathy      Imaging: Her mammogram and ultrasound were reviewed.    Impression: Papillary lesion with atypia of the left  breast.  Further excision is indicated to rule out DCIS or invasive  carcinoma. Risks and benefits of surgery explained and they wish to proceed.  All questions answered.  Did explain that even if the further excision is negative, just having this is an initial diagnosis does put her at higher risk for developing breast cancer in the future.    Plan: Left breast biopsy with wire localization.  Typically an outpatient procedure under local MAC anesthetic.  Follow-up with me after surgery can be as needed if pathology shows anything of concern or if she has any problems with surgery.  Assuming the pathology is negative, she will still need to continue with yearly mammograms.        Again, thank you for allowing me to participate in the care of your patient.        Sincerely,        Liliana Gruber MD

## 2023-09-07 ENCOUNTER — ANESTHESIA EVENT (OUTPATIENT)
Dept: SURGERY | Facility: AMBULATORY SURGERY CENTER | Age: 63
End: 2023-09-07
Payer: MEDICARE

## 2023-09-08 ENCOUNTER — ANCILLARY PROCEDURE (OUTPATIENT)
Dept: MAMMOGRAPHY | Facility: CLINIC | Age: 63
End: 2023-09-08
Attending: SPECIALIST
Payer: MEDICARE

## 2023-09-08 ENCOUNTER — ANCILLARY ORDERS (OUTPATIENT)
Dept: SURGERY | Facility: CLINIC | Age: 63
End: 2023-09-08

## 2023-09-08 ENCOUNTER — HOSPITAL ENCOUNTER (OUTPATIENT)
Facility: AMBULATORY SURGERY CENTER | Age: 63
Discharge: HOME | End: 2023-09-08
Attending: SPECIALIST
Payer: MEDICARE

## 2023-09-08 ENCOUNTER — ANESTHESIA (OUTPATIENT)
Dept: SURGERY | Facility: AMBULATORY SURGERY CENTER | Age: 63
End: 2023-09-08
Payer: MEDICARE

## 2023-09-08 VITALS
OXYGEN SATURATION: 95 % | TEMPERATURE: 97.5 F | RESPIRATION RATE: 16 BRPM | HEART RATE: 74 BPM | WEIGHT: 220 LBS | BODY MASS INDEX: 36.65 KG/M2 | SYSTOLIC BLOOD PRESSURE: 96 MMHG | HEIGHT: 65 IN | DIASTOLIC BLOOD PRESSURE: 48 MMHG

## 2023-09-08 DIAGNOSIS — D24.2 INTRADUCTAL PAPILLOMA OF LEFT BREAST: ICD-10-CM

## 2023-09-08 DIAGNOSIS — D24.2 INTRADUCTAL PAPILLOMA OF LEFT BREAST: Primary | ICD-10-CM

## 2023-09-08 LAB — GLUCOSE POCT: 154 MG/DL (ref 70–99)

## 2023-09-08 PROCEDURE — 250N000009 HC RX 250: Performed by: SPECIALIST

## 2023-09-08 PROCEDURE — C1819 TISSUE LOCALIZATION-EXCISION: HCPCS

## 2023-09-08 PROCEDURE — 999N000065 MA POST PROCEDURE LEFT

## 2023-09-08 PROCEDURE — 19301 PARTIAL MASTECTOMY: CPT | Mod: LT | Performed by: SPECIALIST

## 2023-09-08 RX ORDER — IPRATROPIUM BROMIDE AND ALBUTEROL SULFATE 2.5; .5 MG/3ML; MG/3ML
3 SOLUTION RESPIRATORY (INHALATION) ONCE
Status: COMPLETED | OUTPATIENT
Start: 2023-09-08 | End: 2023-09-08

## 2023-09-08 RX ORDER — GLYCOPYRROLATE 0.2 MG/ML
INJECTION, SOLUTION INTRAMUSCULAR; INTRAVENOUS PRN
Status: DISCONTINUED | OUTPATIENT
Start: 2023-09-08 | End: 2023-09-08

## 2023-09-08 RX ORDER — DEXAMETHASONE SODIUM PHOSPHATE 4 MG/ML
INJECTION, SOLUTION INTRA-ARTICULAR; INTRALESIONAL; INTRAMUSCULAR; INTRAVENOUS; SOFT TISSUE PRN
Status: DISCONTINUED | OUTPATIENT
Start: 2023-09-08 | End: 2023-09-08

## 2023-09-08 RX ORDER — PROPOFOL 10 MG/ML
INJECTION, EMULSION INTRAVENOUS CONTINUOUS PRN
Status: DISCONTINUED | OUTPATIENT
Start: 2023-09-08 | End: 2023-09-08

## 2023-09-08 RX ORDER — OXYCODONE HYDROCHLORIDE 10 MG/1
10 TABLET ORAL
Status: COMPLETED | OUTPATIENT
Start: 2023-09-08 | End: 2023-09-08

## 2023-09-08 RX ORDER — IBUPROFEN 600 MG/1
600 TABLET, FILM COATED ORAL
Status: DISCONTINUED | OUTPATIENT
Start: 2023-09-08 | End: 2023-09-09 | Stop reason: HOSPADM

## 2023-09-08 RX ORDER — ACETAMINOPHEN 325 MG/1
975 TABLET ORAL ONCE
Status: COMPLETED | OUTPATIENT
Start: 2023-09-08 | End: 2023-09-08

## 2023-09-08 RX ORDER — HYDROCODONE BITARTRATE AND ACETAMINOPHEN 5; 325 MG/1; MG/1
1 TABLET ORAL EVERY 6 HOURS PRN
Qty: 10 TABLET | Refills: 0 | Status: SHIPPED | OUTPATIENT
Start: 2023-09-08 | End: 2023-10-03

## 2023-09-08 RX ORDER — LIDOCAINE 40 MG/G
CREAM TOPICAL
Status: DISCONTINUED | OUTPATIENT
Start: 2023-09-08 | End: 2023-09-09 | Stop reason: HOSPADM

## 2023-09-08 RX ORDER — ONDANSETRON 2 MG/ML
4 INJECTION INTRAMUSCULAR; INTRAVENOUS EVERY 30 MIN PRN
Status: DISCONTINUED | OUTPATIENT
Start: 2023-09-08 | End: 2023-09-09 | Stop reason: HOSPADM

## 2023-09-08 RX ORDER — BUPIVACAINE HYDROCHLORIDE 2.5 MG/ML
INJECTION, SOLUTION INFILTRATION; PERINEURAL PRN
Status: DISCONTINUED | OUTPATIENT
Start: 2023-09-08 | End: 2023-09-08 | Stop reason: HOSPADM

## 2023-09-08 RX ORDER — FENTANYL CITRATE 50 UG/ML
INJECTION, SOLUTION INTRAMUSCULAR; INTRAVENOUS PRN
Status: DISCONTINUED | OUTPATIENT
Start: 2023-09-08 | End: 2023-09-08

## 2023-09-08 RX ORDER — ONDANSETRON 4 MG/1
4 TABLET, ORALLY DISINTEGRATING ORAL EVERY 30 MIN PRN
Status: DISCONTINUED | OUTPATIENT
Start: 2023-09-08 | End: 2023-09-09 | Stop reason: HOSPADM

## 2023-09-08 RX ORDER — HYDROCODONE BITARTRATE AND ACETAMINOPHEN 5; 325 MG/1; MG/1
1 TABLET ORAL
Status: DISCONTINUED | OUTPATIENT
Start: 2023-09-08 | End: 2023-09-09 | Stop reason: HOSPADM

## 2023-09-08 RX ORDER — OXYCODONE HYDROCHLORIDE 5 MG/1
5 TABLET ORAL
Status: DISCONTINUED | OUTPATIENT
Start: 2023-09-08 | End: 2023-09-09 | Stop reason: HOSPADM

## 2023-09-08 RX ORDER — LIDOCAINE HYDROCHLORIDE 10 MG/ML
INJECTION, SOLUTION INFILTRATION; PERINEURAL PRN
Status: DISCONTINUED | OUTPATIENT
Start: 2023-09-08 | End: 2023-09-08

## 2023-09-08 RX ORDER — ONDANSETRON 2 MG/ML
INJECTION INTRAMUSCULAR; INTRAVENOUS PRN
Status: DISCONTINUED | OUTPATIENT
Start: 2023-09-08 | End: 2023-09-08

## 2023-09-08 RX ORDER — SODIUM CHLORIDE, SODIUM LACTATE, POTASSIUM CHLORIDE, CALCIUM CHLORIDE 600; 310; 30; 20 MG/100ML; MG/100ML; MG/100ML; MG/100ML
INJECTION, SOLUTION INTRAVENOUS CONTINUOUS
Status: DISCONTINUED | OUTPATIENT
Start: 2023-09-08 | End: 2023-09-09 | Stop reason: HOSPADM

## 2023-09-08 RX ADMIN — PROPOFOL 200 MCG/KG/MIN: 10 INJECTION, EMULSION INTRAVENOUS at 11:14

## 2023-09-08 RX ADMIN — Medication 100 MCG: at 11:38

## 2023-09-08 RX ADMIN — LIDOCAINE HYDROCHLORIDE 10 ML: 10 INJECTION, SOLUTION INFILTRATION; PERINEURAL at 09:39

## 2023-09-08 RX ADMIN — OXYCODONE HYDROCHLORIDE 10 MG: 10 TABLET ORAL at 12:08

## 2023-09-08 RX ADMIN — DEXAMETHASONE SODIUM PHOSPHATE 4 MG: 4 INJECTION, SOLUTION INTRA-ARTICULAR; INTRALESIONAL; INTRAMUSCULAR; INTRAVENOUS; SOFT TISSUE at 11:12

## 2023-09-08 RX ADMIN — FENTANYL CITRATE 25 MCG: 50 INJECTION, SOLUTION INTRAMUSCULAR; INTRAVENOUS at 11:24

## 2023-09-08 RX ADMIN — ONDANSETRON 4 MG: 2 INJECTION INTRAMUSCULAR; INTRAVENOUS at 11:12

## 2023-09-08 RX ADMIN — LIDOCAINE HYDROCHLORIDE 60 MG: 10 INJECTION, SOLUTION INFILTRATION; PERINEURAL at 11:12

## 2023-09-08 RX ADMIN — IPRATROPIUM BROMIDE AND ALBUTEROL SULFATE 3 ML: 2.5; .5 SOLUTION RESPIRATORY (INHALATION) at 14:15

## 2023-09-08 RX ADMIN — SODIUM CHLORIDE, SODIUM LACTATE, POTASSIUM CHLORIDE, CALCIUM CHLORIDE: 600; 310; 30; 20 INJECTION, SOLUTION INTRAVENOUS at 10:36

## 2023-09-08 RX ADMIN — GLYCOPYRROLATE 0.1 MG: 0.2 INJECTION, SOLUTION INTRAMUSCULAR; INTRAVENOUS at 11:12

## 2023-09-08 RX ADMIN — GLYCOPYRROLATE 0.1 MG: 0.2 INJECTION, SOLUTION INTRAMUSCULAR; INTRAVENOUS at 11:17

## 2023-09-08 RX ADMIN — Medication 100 MCG: at 11:31

## 2023-09-08 RX ADMIN — ACETAMINOPHEN 975 MG: 325 TABLET ORAL at 08:40

## 2023-09-08 ASSESSMENT — LIFESTYLE VARIABLES: TOBACCO_USE: 1

## 2023-09-08 NOTE — DISCHARGE INSTRUCTIONS
If you have any questions or concerns regarding your procedure, please contact Dr. Gruber, her office number is [phone].      You have received 975 mg of Acetaminophen (Tylenol) at 0840. Please do not take an additional dose of Tylenol until after 2:40 pm   Take the Tylenol in between the doses of Ibuprofen while awake.    Do not exceed 4,000 mg of acetaminophen during a 24 hour period and keep in mind that acetaminophen can also be found in many over-the-counter cold medications as well as narcotics that may be given for pain.     Ibuprofen every 6 hours for the first 2 days. You can start this when you get home. Set the alarm clock for every 6 hours when you take it, for the first 2 days.     Ice 20 minutes per hour while awake for the first 2 days. You do not need to use ice when sleeping.      The incentive Spirometer, every time you do it, do it 10 times. Do it every time the commercials come on.  If not watching TV, do it at least 4 times per hour while awake, and 10 times each time you do it.

## 2023-09-08 NOTE — INTERVAL H&P NOTE
"I have reviewed the surgical (or preoperative) H&P that is linked to this encounter, and examined the patient. There are no significant changes    Clinical Conditions Present on Arrival:  Clinically Significant Risk Factors Present on Admission                 # Drug Induced Platelet Defect: home medication list includes an antiplatelet medication  # DMII: A1C = 7.3 % (Ref range: 0.0 - 5.6 %) within past 6 months  # Obesity: Estimated body mass index is 37.18 kg/m  as calculated from the following:    Height as of this encounter: 1.638 m (5' 4.5\").    Weight as of this encounter: 99.8 kg (220 lb).       "

## 2023-09-08 NOTE — PROGRESS NOTES
EKG completed as ordered per Dr. De La Cruz.  Given to provider for interpretation.  Patient tolerated procedure well.  Cari Akhtar RN on 9/8/2023 at 11:16 AM

## 2023-09-08 NOTE — PROGRESS NOTES
Pt is periodically dropping her saturations into the low 80's. Family is with her and reminding her to take deep breaths. Pt instructed on the use of the incentive spirometer. I also advised them that they could get a pulse ox from almost any pharmacy just to spot check if they would like. I advised pt and the family, after I gave the Discharge instructions that, I am going to keep her here until she is able to keep her oxygen levels up without us reminding her to take deep breaths. Family is ok with this plan.

## 2023-09-08 NOTE — OP NOTE
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    Operative Report:    The patient was brought to the operating suite where she was placed in the supine position.  She was prepped and draped in a sterile fashion.  After infiltration with 1% lidocaine and quarter percent Marcaine  a curvilinear  incision was made adjacent to the wire in the upper outer  left breast .  This is carried down to and around the mass which was now palpable  with electrocautery. It was removed, marked and sent  to pathology. The wound was inspected for hemostasis and closed with 3-0 Vicryl to the subcutaneous tissues and a subcuticular suture for Monocryl to the skin.  Dressings were placed.  The patient procedure well.    Estimated Blood Loss:   5cc    Specimens:    ID Type Source Tests Collected by Time Destination   1 :  Lumpectomy Breast, Left SURGICAL PATHOLOGY EXAM Liliana Gruber MD 9/8/2023 11:38 AM            Drains:        Complications:    None    Liliana Gruber MD     Date: 9/8/2023  Time: 11:45 AM

## 2023-09-08 NOTE — ANESTHESIA PREPROCEDURE EVALUATION
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     No history of anesthetic complications       ROS/MED HX  ENT/Pulmonary: Comment: 9/3/22 CT Chest  CT CHEST PE STUDY  Order: [id number]  Impression    1.  No pulmonary embolism.  2.  There is interlobular septal thickening throughout the lungs with scattered areas of groundglass attenuation. There is also mild four-chamber cardiomegaly. Overall findings are most suggestive of CHF with pulmonary edema.  3.  Mild dilatation of the main pulmonary artery to 3.2 cm, which can be seen with pulmonary hypertension.      (+)                tobacco use, Current use,                      Neurologic:       Cardiovascular: Comment: Pt c/o chest tightness, intermittent for several months, not brought on by activity.  Pt says it was worked up at a hospital 2 months ago.      11/10/22 Echo  Interpretation Summary     Technically difficult study.Extremely poor acoustic windows.  Right ventricular function, chamber size, wall motion, and thickness are  normal.  Both atria appear normal.  Pulmonary artery systolic pressure cannot be assessed.  The inferior vena cava is normal.  No pericardial effusion is present.  There is no prior study for direct  Detail Level: Zone comparison.  ____________________________________      (+) Dyslipidemia hypertension- -   -  - -                                      METS/Exercise Tolerance: >4 METS    Hematologic:  - neg hematologic  ROS     Musculoskeletal:   (+)  arthritis,             GI/Hepatic:     (+) GERD,                   Renal/Genitourinary:       Endo:     (+)  type II DM,   Using insulin,          Obesity (BMI 39),       Psychiatric/Substance Use:     (+) psychiatric history anxiety       Infectious Disease:       Malignancy: Comment: Intraductal papilloma of left breast  (+) Malignancy, History of Breast.    Other:            Physical Exam    Airway        Mallampati: III   TM distance: > 3 FB   Neck ROM: full   Mouth opening: > 3 cm    Respiratory Devices and Support         Dental     Comment: fair        Cardiovascular   cardiovascular exam normal          Pulmonary   pulmonary exam normal                OUTSIDE LABS:  CBC:   Lab Results   Component Value Date    WBC 10.5 05/24/2023    WBC 11.8 (H) 04/20/2023    HGB 14.1 05/24/2023    HGB 13.4 04/20/2023    HCT 42.7 05/24/2023    HCT 40.7 04/20/2023     05/24/2023     04/20/2023     BMP:   Lab Results   Component Value Date     10/19/2022     02/07/2022    POTASSIUM 3.9 10/19/2022    POTASSIUM 3.8 02/07/2022    CHLORIDE 102 10/19/2022    CHLORIDE 108 02/07/2022    CO2 30 10/19/2022    CO2 24 02/07/2022    BUN 18 10/19/2022    BUN 14 02/07/2022    CR 0.50 (L) 10/19/2022    CR 0.44 (L) 02/07/2022     (H) 10/19/2022     (H) 02/07/2022     COAGS:   Lab Results   Component Value Date    PTT 31 12/28/2012    INR 1.05 12/28/2012     POC:   Lab Results   Component Value Date    BGM 99 12/28/2012     HEPATIC:   Lab Results   Component Value Date    ALBUMIN 4.2 07/19/2010    PROTTOTAL 7.7 07/19/2010    ALT 36 04/20/2023    AST 24 07/19/2010    ALKPHOS 72 07/19/2010    BILITOTAL 0.4 07/19/2010     OTHER:   Lab Results   Component Value Date    A1C 7.3  (H) 08/01/2023    AILEEN 9.6 10/19/2022    TSH 1.18 04/20/2023    CRP 14.1 (H) 05/24/2023    SED 16 05/24/2023       Anesthesia Plan    ASA Status:  3    NPO Status:  NPO Appropriate    Anesthesia Type: MAC.              Consents    Anesthesia Plan(s) and associated risks, benefits, and realistic alternatives discussed. Questions answered and patient/representative(s) expressed understanding.     - Discussed: Risks, Benefits and Alternatives for BOTH SEDATION and the PROCEDURE were discussed     - Discussed with:  Patient, , Spouse, Other (See Comment) (Son.  Phone  used, Ana María)      - Extended Intubation/Ventilatory Support Discussed: No.      - Patient is DNR/DNI Status: No          Postoperative Care    Pain management: Multi-modal analgesia.   PONV prophylaxis: Ondansetron (or other 5HT-3)     Comments:    Other Comments: Preop ECG today.  No acute changes.  Risks and benefits discussed with patient and her family.  Pt wishes to proceed.    Maintain MAP above 75 and SBP above 100  FiO2 less than 0.30 during cautery            Aleena De La Cruz MD   Plan: Continue Allegra 180mg For Another [employer] Given Render In Strict Bullet Format?: No Continue Regimen: Allegra Allergy 180 mg tablet \\nSig: Take one tablet once daily in the morning\\n\\nclobetasol 0.05 % topical cream BID\\nSig: Apply twice daily to rash on body  up to 2 weeks; PRN use\\n\\nmometasone 0.1 % topical cream \\nSig: Apply twice daily to rash on face and scalp up to 2 weeks; PRN use Discontinue Regimen: Claritin 10 mg tablet

## 2023-09-08 NOTE — PROGRESS NOTES
Pt discharged home with the ok of the [facility]. Pt ambulated around the nursing station and dropped to 89% at one point, but quickly brought the sat's up to 96% on RA while ambulating. She had her eye on the portable pulse ox the entire time. We stopped in the MDA's office and he listened to her lungs, I informed him of the dip in o2 , and that she had her eye on the sat's the whole time. I advised the family on picking up a pulse ox at the pharmacy as well just to spot check and reminded them the use of the Incentive Spirometer.  Pt looked good at discontinue, responding appropriately, denied pain, both her and her family were thankful for the care that they received.

## 2023-09-08 NOTE — PROGRESS NOTES
Ambulated with patient around unit.  O2 saturation on room air prior to ambulation was 98%. Upon return to patient room was 72% and patient states she felt her vision was a little blurry, but no dizziness.  Placed on 3L O2 via nasal cannula.  Primary RN notified of these developments.  Cari Akhtar RN on 9/8/2023 at 2:06 PM

## 2023-09-08 NOTE — ANESTHESIA CARE TRANSFER NOTE
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    Anesthesia Type:   MAC     Note:    Oropharynx: oropharynx clear of all foreign objects  Level of Consciousness: awake  Oxygen Supplementation: face mask  Level of Supplemental Oxygen (L/min / FiO2): 8  Independent Airway: airway patency satisfactory and stable  Dentition: dentition unchanged  Vital Signs Stable: post-procedure vital signs reviewed and stable  Report to RN Given: handoff report given  Patient transferred to: Phase II    Handoff Report: Identifed the Patient, Identified the Reponsible Provider, Reviewed the pertinent medical history, Discussed the surgical course, Reviewed Intra-OP anesthesia mangement and issues during anesthesia, Set expectations for post-procedure period and Allowed opportunity for questions and acknowledgement of understanding      Vitals:  Vitals Value Taken Time   /78    Temp 37C    Pulse 68    Resp 15    SpO2 99        Electronically Signed By: MARCO Lopez CRNA  September 8, 2023  11:50 AM

## 2023-09-08 NOTE — ANESTHESIA POSTPROCEDURE EVALUATION
Patient: Jacob Cai    Procedure: Procedure(s):  Left Lumpectomy after Wire Localization       Anesthesia Type:  MAC    Note:  Disposition: Outpatient   Postop Pain Control: Uneventful            Sign Out: Well controlled pain   PONV: No   Neuro/Psych: Uneventful            Sign Out: Acceptable/Baseline neuro status   Airway/Respiratory: Uneventful            Sign Out: Acceptable/Baseline resp. status; O2 supplementation               Oxygen: Nasal Cannula (See Comments below)   CV/Hemodynamics: Uneventful            Sign Out: Acceptable CV status; No obvious hypovolemia; No obvious fluid overload   Other NRE: NONE   DID A NON-ROUTINE EVENT OCCUR? No    Event details/Postop Comments:  Pt required oxygen by nasal cannula post procedure.  Pt used incentive spirometer and recruitment maneuvers.  Duoneb given and pt ambulated with improved SpO2.  Pt was discharged at baseline.  Lungs were clear and patient had no complaints or dyspnea.             Last vitals:  Vitals Value Taken Time   BP 95/52 09/08/23 1245   Temp 97.5  F (36.4  C) 09/08/23 1149   Pulse 63 09/08/23 1246   Resp 16 09/08/23 1149   SpO2 98 % 09/08/23 1246   Vitals shown include unvalidated device data.    Electronically Signed By: Aleena De La Cruz MD  September 8, 2023  12:49 PM

## 2023-09-19 ENCOUNTER — TELEPHONE (OUTPATIENT)
Dept: FAMILY MEDICINE | Facility: CLINIC | Age: 63
End: 2023-09-19
Payer: MEDICARE

## 2023-09-19 DIAGNOSIS — E11.9 TYPE 2 DIABETES MELLITUS WITHOUT COMPLICATION, WITHOUT LONG-TERM CURRENT USE OF INSULIN (H): ICD-10-CM

## 2023-09-19 RX ORDER — PEN NEEDLE, DIABETIC 31 GX3/16"
NEEDLE, DISPOSABLE MISCELLANEOUS
Qty: 200 EACH | Refills: 4 | Status: SHIPPED | OUTPATIENT
Start: 2023-09-19 | End: 2023-10-09

## 2023-09-19 NOTE — TELEPHONE ENCOUNTER
Pharmacy called and needs new directions for pen needles sent to the pharmacy.    Patient is using them BID with her insulin.    Cued up med, and routed to PCP.    Marylu JEFFERSONN RN  Triage Nurse  [facility]

## 2023-09-22 DIAGNOSIS — E11.9 TYPE 2 DIABETES MELLITUS WITHOUT COMPLICATION, WITHOUT LONG-TERM CURRENT USE OF INSULIN (H): ICD-10-CM

## 2023-09-22 RX ORDER — PEN NEEDLE, DIABETIC 31 GX3/16"
NEEDLE, DISPOSABLE MISCELLANEOUS
Qty: 200 EACH | Refills: 4 | OUTPATIENT
Start: 2023-09-22

## 2023-10-03 ENCOUNTER — OFFICE VISIT (OUTPATIENT)
Dept: SURGERY | Facility: CLINIC | Age: 63
End: 2023-10-03
Attending: SPECIALIST
Payer: MEDICARE

## 2023-10-03 VITALS — TEMPERATURE: 97.7 F

## 2023-10-03 DIAGNOSIS — N61.0 MASTITIS: Primary | ICD-10-CM

## 2023-10-03 PROCEDURE — G0463 HOSPITAL OUTPT CLINIC VISIT: HCPCS | Performed by: SPECIALIST

## 2023-10-03 PROCEDURE — 99213 OFFICE O/P EST LOW 20 MIN: CPT | Mod: 24 | Performed by: SPECIALIST

## 2023-10-03 RX ORDER — CEPHALEXIN 500 MG/1
500 CAPSULE ORAL 3 TIMES DAILY
Qty: 30 CAPSULE | Refills: 0 | Status: SHIPPED | OUTPATIENT
Start: 2023-10-03 | End: 2023-10-13

## 2023-10-03 NOTE — PROGRESS NOTES
In for follow-up of her left subareolar duct excision for multifocal papillomas.  She was doing well but then just in the last day or so has noticed some redness in the breast.  It is not particularly tender.  It is not warm to touch.  She has had no fevers.    Physical exam:  Appears well.  Does not appear in any discomfort.  Breasts: Incisions are healing nicely with no signs of infection.  No swelling.  Looks great.  In the central portion of the breast there is erythema but no induration.  It is not hot.  This is well away from her incision.  Incision looks great.      Pathology: Multifocal papillomas.    Impression: It looks somewhat like cellulitis but very odd and that its not even close to the incision and she has no other signs of infection.  To be safe however since she is diabetic I am going to put her on a round of Keflex.    Plan: Order for Keflex placed.  Follow-up with me will be as needed.  I asked her to call us in a couple days and let us know if things are improving.

## 2023-10-03 NOTE — NURSING NOTE
Jacob presents to [facility] for a surgery follow up appointment with Dr. Gruber regarding redness of her breast.  She noticed it shortly after her surgery. She said she is also noticing breast pain.  She is using the JEANNA Ana María. RN assessment and EMR update.  Patient met with Dr. Gruber, see dictation for details of visit and follow up plan.

## 2023-10-03 NOTE — LETTER
10/3/2023         RE: Jacob Cai  [address]  [place] [address]        Dear Colleague,    Thank you for referring your patient, Jacob Cai, to the [facility] [place]. Please see a copy of my visit note below.    In for follow-up of her left subareolar duct excision for multifocal papillomas.  She was doing well but then just in the last day or so has noticed some redness in the breast.  It is not particularly tender.  It is not warm to touch.  She has had no fevers.    Physical exam:  Appears well.  Does not appear in any discomfort.  Breasts: Incisions are healing nicely with no signs of infection.  No swelling.  Looks great.  In the central portion of the breast there is erythema but no induration.  It is not hot.  This is well away from her incision.  Incision looks great.      Pathology: Multifocal papillomas.    Impression: It looks somewhat like cellulitis but very odd and that its not even close to the incision and she has no other signs of infection.  To be safe however since she is diabetic I am going to put her on a round of Keflex.    Plan: Order for Keflex placed.  Follow-up with me will be as needed.  I asked her to call us in a couple days and let us know if things are improving.      Again, thank you for allowing me to participate in the care of your patient.        Sincerely,        Liliana Gruber MD

## 2023-10-09 ENCOUNTER — TELEPHONE (OUTPATIENT)
Dept: SURGERY | Facility: CLINIC | Age: 63
End: 2023-10-09
Payer: MEDICARE

## 2023-10-09 DIAGNOSIS — E11.9 TYPE 2 DIABETES MELLITUS WITHOUT COMPLICATION, WITHOUT LONG-TERM CURRENT USE OF INSULIN (H): ICD-10-CM

## 2023-10-09 DIAGNOSIS — I10 BENIGN ESSENTIAL HYPERTENSION WITH TARGET BLOOD PRESSURE BELOW 140/90: ICD-10-CM

## 2023-10-09 NOTE — TELEPHONE ENCOUNTER
"Patient's daughter called with an update for Dr. Gruber at Dr. Gruber's request.  States patient says she still has \"a little pain and redness around the area\". States she is taking her medication.  Will update Dr. Gruber.  "

## 2023-10-10 RX ORDER — PEN NEEDLE, DIABETIC 31 GX3/16"
NEEDLE, DISPOSABLE MISCELLANEOUS
Qty: 200 EACH | Refills: 1 | Status: SHIPPED | OUTPATIENT
Start: 2023-10-10 | End: 2023-11-15

## 2023-10-11 ENCOUNTER — TELEPHONE (OUTPATIENT)
Dept: SURGERY | Facility: CLINIC | Age: 63
End: 2023-10-11
Payer: MEDICARE

## 2023-10-11 NOTE — TELEPHONE ENCOUNTER
"Patient's daughter called with an update for Dr. Gruber at Dr. Gruber's request.  States patient says she still has \"a little pain and redness around the area\". States she is taking her medication.  Dr. Gruber notified.  Per Dr. Gruber, as long as its not painful or warm, we should just watch it for now.  Make sure she is wearing a good support bra to get some compression.  She should let us know if it gets worse.  Passed this information onto patient's daughter who will  tell her mother.  Support provided, invited calls.             "

## 2023-10-31 PROCEDURE — 82274 ASSAY TEST FOR BLOOD FECAL: CPT | Performed by: PHYSICIAN ASSISTANT

## 2023-11-01 ENCOUNTER — LAB (OUTPATIENT)
Dept: LAB | Facility: CLINIC | Age: 63
End: 2023-11-01
Payer: MEDICARE

## 2023-11-01 DIAGNOSIS — E11.9 TYPE 2 DIABETES MELLITUS WITHOUT COMPLICATION, WITHOUT LONG-TERM CURRENT USE OF INSULIN (H): ICD-10-CM

## 2023-11-01 DIAGNOSIS — E78.5 HYPERLIPIDEMIA LDL GOAL <100: ICD-10-CM

## 2023-11-01 LAB
ALBUMIN SERPL BCG-MCNC: 4 G/DL (ref 3.5–5.2)
ALP SERPL-CCNC: 55 U/L (ref 35–104)
ALT SERPL W P-5'-P-CCNC: 20 U/L (ref 0–50)
AST SERPL W P-5'-P-CCNC: 18 U/L (ref 0–45)
BILIRUB DIRECT SERPL-MCNC: <0.2 MG/DL (ref 0–0.3)
BILIRUB SERPL-MCNC: 0.3 MG/DL
CHOLEST SERPL-MCNC: 115 MG/DL
HBA1C MFR BLD: 10 % (ref 0–5.6)
HDLC SERPL-MCNC: 26 MG/DL
HEMOCCULT STL QL IA: POSITIVE
LDLC SERPL CALC-MCNC: 60 MG/DL
NONHDLC SERPL-MCNC: 89 MG/DL
PROT SERPL-MCNC: 7.4 G/DL (ref 6.4–8.3)
TRIGL SERPL-MCNC: 146 MG/DL

## 2023-11-01 PROCEDURE — 83036 HEMOGLOBIN GLYCOSYLATED A1C: CPT

## 2023-11-01 PROCEDURE — 36415 COLL VENOUS BLD VENIPUNCTURE: CPT

## 2023-11-01 PROCEDURE — 80076 HEPATIC FUNCTION PANEL: CPT

## 2023-11-01 PROCEDURE — 80061 LIPID PANEL: CPT

## 2023-11-02 ENCOUNTER — TELEPHONE (OUTPATIENT)
Dept: FAMILY MEDICINE | Facility: CLINIC | Age: 63
End: 2023-11-02
Payer: MEDICARE

## 2023-11-02 DIAGNOSIS — R19.5 POSITIVE COLORECTAL CANCER SCREENING USING COLOGUARD TEST: ICD-10-CM

## 2023-11-02 DIAGNOSIS — Z12.11 COLON CANCER SCREENING: Primary | ICD-10-CM

## 2023-11-02 NOTE — TELEPHONE ENCOUNTER
Please call patient with an  with the following info:    Her Cologard test came back positive. She should have a colonoscopy to rule out colon cancer, colon polyps, or any other abnormalities. Referral placed    A1c has improved. Continue meds without changes. Will recheck another A1c at her next visit

## 2023-11-02 NOTE — TELEPHONE ENCOUNTER
Called [phone] (home), using [facility] [place] [facility] .  Did they answer the phone: No, left a message on voicemail to return call to the [facility] at [phone], and to ask for any available triage nurse.    Marylu HUTCHISON BSN  Triage Nurse  [facility]

## 2023-11-03 ENCOUNTER — TELEPHONE (OUTPATIENT)
Dept: FAMILY MEDICINE | Facility: CLINIC | Age: 63
End: 2023-11-03
Payer: MEDICARE

## 2023-11-03 DIAGNOSIS — E11.65 UNCONTROLLED TYPE 2 DIABETES MELLITUS WITH HYPERGLYCEMIA (H): Primary | ICD-10-CM

## 2023-11-03 DIAGNOSIS — E11.9 TYPE 2 DIABETES MELLITUS WITHOUT COMPLICATION, WITHOUT LONG-TERM CURRENT USE OF INSULIN (H): ICD-10-CM

## 2023-11-03 NOTE — TELEPHONE ENCOUNTER
RN called patient with [facility]  on line and relayed providers message. Patient verbalized understanding after RN educated what A1c lab is.    Patient stated she is taking     Insulin Levemir 21 units 1x/day in the evening and Victoza     Oral medications Glipizide and Metformin as prescribed.      Lisa Valverde RN on 11/3/2023 at 3:35 PM

## 2023-11-03 NOTE — TELEPHONE ENCOUNTER
Called [phone] (home),using [employer] .   Did they answer the phone: No, left a message on voicemail to return call to the [facility] at [phone], and to ask for any available triage nurse.    Marylu RN BSN  Triage Nurse  [facility]

## 2023-11-03 NOTE — TELEPHONE ENCOUNTER
Please call patient with the following info:    A1c is very elevated at 10%  How much insulin is she using daily?  Is she taking all of her oral diabetes meds?

## 2023-11-06 RX ORDER — INSULIN DETEMIR 100 [IU]/ML
27 INJECTION, SOLUTION SUBCUTANEOUS AT BEDTIME
Qty: 15 ML | Refills: 1 | Status: SHIPPED | OUTPATIENT
Start: 2023-11-06 | End: 2023-11-17

## 2023-11-06 NOTE — TELEPHONE ENCOUNTER
Increase insulin to 24 units x5 days then again in 27 units daily.   She needs to follow up with one of our pharmacy specialists to discuss her medications and insulin dosing - referral placed

## 2023-11-06 NOTE — TELEPHONE ENCOUNTER
I called patient along with a Yudithn .     Patient wrote down the instructions for Insulin dosing and the phone number to schedule for Medication management. Patient able to repeat back these instructions  to me and will call back with any further questions or concerns.     Dahiana Hobson RN BSN  [facility]

## 2023-11-06 NOTE — TELEPHONE ENCOUNTER
Notified patient of the orders, and results below, using [facility] .  Phone number given to schedule an appointment for a colonoscopy.    Patient stated understanding and agreeable with the plan of care.     Marylu RN BSN  Triage Nurse  [facility]

## 2023-11-07 ENCOUNTER — TELEPHONE (OUTPATIENT)
Dept: GASTROENTEROLOGY | Facility: CLINIC | Age: 63
End: 2023-11-07
Payer: MEDICARE

## 2023-11-07 NOTE — TELEPHONE ENCOUNTER
"Endoscopy Scheduling Screen    Have you had a positive Covid test in the last 14 days?  No    Are you active on MyChart?   No    What insurance is in the chart?  Other:  MEDICARE    Ordering/Referring Provider:     Clara Ambriz PA-C in [facility]      (If ordering provider performs procedure, schedule with ordering provider unless otherwise instructed. )    BMI: Estimated body mass index is 37.18 kg/m  as calculated from the following:    Height as of 9/5/23: 1.638 m (5' 4.5\").    Weight as of 9/5/23: 99.8 kg (220 lb).     Sedation Ordered  moderate sedation.   If patient BMI > 50 do not schedule in [facility].    If patient BMI > 45 do not schedule at [facility].    Are you taking methadone or Suboxone?  No    Are you taking any prescription medications for pain 3 or more times per week?   No    Do you have a history of malignant hyperthermia or adverse reaction to anesthesia?  No    (Females) Are you currently pregnant?   No     Have you been diagnosed or told you have pulmonary hypertension?   No    Do you have an LVAD?  No    Have you been told you have moderate to severe sleep apnea?  No    Have you been told you have COPD, asthma, or any other lung disease?  No    Do you have any heart conditions?  No     Have you ever had an organ transplant?   No    Have you ever had or are you awaiting a heart or lung transplant?   No    Have you had a stroke or transient ischemic attack (TIA aka \"mini stroke\" in the last 6 months?   No    Have you been diagnosed with or been told you have cirrhosis of the liver?   No    Are you currently on dialysis?   No    Do you need assistance transferring?   No    BMI: Estimated body mass index is 37.18 kg/m  as calculated from the following:    Height as of 9/5/23: 1.638 m (5' 4.5\").    Weight as of 9/5/23: 99.8 kg (220 lb).     Is patients BMI > 40 and scheduling location [facility]?  No    Do you take an injectable medication for weight loss or diabetes (excluding insulin)?  No    Do " you take the medication Naltrexone?  No    Do you take blood thinners?  No       Prep   Are you currently on dialysis or do you have chronic kidney disease?  No    Do you have a diagnosis of diabetes?  Yes (Golytely Prep)    Do you have a diagnosis of cystic fibrosis (CF)?  No    On a regular basis do you go 3 -5 days between bowel movements?  No    BMI > 40?  No    Preferred Pharmacy:    [id number] - [address]  [facility] & [address]   [place] [address]  Phone: [phone] Fax: [phone]      Final Scheduling Details   Colonoscopy prep sent?  Standard MiraLAX    Procedure scheduled  Colonoscopy    Surgeon:  Akilah     Date of procedure:  01/08/2024     Pre-OP / PAC:   No - Not required for this site.    Location  [facility] - [facility] - Patient preference.    Sedation   Moderate Sedation - Patient preference.      Patient Reminders:   You will receive a call from a Nurse to review instructions and health history.  This assessment must be completed prior to your procedure.  Failure to complete the Nurse assessment may result in the procedure being cancelled.      On the day of your procedure, please designate an adult(s) who can drive you home stay with you for the next 24 hours. The medicines used in the exam will make you sleepy. You will not be able to drive.      You cannot take public transportation, ride share services, or non-medical taxi service without a responsible caregiver.  Medical transport services are allowed with the requirement that a responsible caregiver will receive you at your destination.  We require that drivers and caregivers are confirmed prior to your procedure.

## 2023-11-08 NOTE — PROGRESS NOTES
Medication Therapy Management (MTM) Encounter    ASSESSMENT:                            Medication Adherence/Access: No issues identified    Type 2 Diabetes: Patient is not meeting A1c goal of < 7%  and self monitoring of blood glucose is not at goal of fasting  mg/dL. Suspect there is a layer of non-adherence to medications based on patients fill history at her pharmacy. However she endorsed adherence today. Will continue to discuss, but pt may benefit from switching Victoza to a GLP-1/GIP due to improved efficacy. Would likely be safe to start with second dose of Mounjaro (5 mg) since patient is on max dose of Victoza currently. Pt would benefit from using a CGM since she is on insulin twice per day. Kandace 2 was ordered in 2021 but appears it was never filled by the pharmacy.     Microalbumin: due. Last microalbumin was not at goal < 30 mg/g. Pt is on ACEI/ARB.  Aspirin: is appropriate in this patient for primary prevention  Statin: Patient is on moderate intensity statin which is indicated per 2019 ACC/AHA guidelines for lipid management due to the presence of diabetes.  Immunizations: Due for Prevnar 20 , Shingrix series, and annual influenza vaccine  Annual Eye Exam: due.     Hypertension: Stable. Patient is meeting blood pressure goal of < 140/90mmHg.     Depression/Anxiety: stable. PHQ-9 almost at goal of <5. Pt not interested in making adjustments to medication today.    Tobacco Use Disorder: Patient continues to use tobacco. Patient is not ready to quit using tobacco. We discussed: risks of smoking, benefits of quitting, and total abstinence.    PLAN:                            1. Stop Victoza and replace with Mounjaro. Start Mounjaro 5 mg once weekly and will titrate as tolerated.    2. Ordered Freestyle Kandace 2 to mail order pharmacy to see if covered now    3. Pt due for diabetes eye exam and microalbumin assay    4. Pt due for several vaccines: Prevnar 20 , Shingrix series, and annual influenza  vaccine    Follow-up: Return in 3 weeks (on 11/30/2023) for Follow up, with me, using a phone visit.    SUBJECTIVE/OBJECTIVE:                          Jacob Cai is a 62 year old female called for an initial visit. She was referred to me from JOSE Gimenez. Josh (ID: [id number]) was used for this visit.    Reason for visit: uncontrolled diabetes    Allergies/ADRs: None  Past Medical History: Reviewed in chart  Tobacco: She reports that she has been smoking cigarettes. She has never used smokeless tobacco.  Nicotine/Tobacco Cessation Plan: Pharmacotherapies : None  Alcohol: none    Medication Adherence/Access: denies concerns with cost of medications or adherence    Diabetes   Metformin ER 1000 mg twice daily  Glipizide XL 10 mg twice daily  Victoza 1.8 mg once daily  Levemir 24 units twice daily  Aspirin 81mg daily  Statin: atorvastatin 20 mg daily    Pt struggling with blood sugar control  She didn't increase Levemir dose  Is still taking 24 units twice daily  Tolerating Victoza well  Denies diarrhea, nausea, constipation  Has never tried different GLP-1    Current diabetes symptoms: none  Blood sugar monitoring: checks twice daily with glucometer, readings 145 mg/dL in the morning and around 225 mg/dL after meals     Eye exam in the last 12 months? No - overdue  Foot exam: due    Hypertension   Amlodipine 10 mg daily  Hydrochlorothiazide 50 mg - unclear what dose she should be taking  Lisinopril 40 mg daily  Metoprolol  mg daily    Denies dizziness, lightheadedness, or edema  She says her hydrochlorothiazide was reduced to 25 mg daily not too long ago  She eventually increased back to 50 mg daily due to edema  Is feeling good on current regimen  Patient self monitors blood pressure.    Home readings 120-140/65-70 mmHg     Depression/Anxiety:  Sertraline 200 mg once daily  Denies GI upset, somnolence, or insomnia  Feels mood is well controlled on sertraline  No questions/concerns at this  time      9/26/2022     9:50 AM 10/19/2022     8:08 AM 1/20/2023    10:14 AM   PHQ-9 SCORE   PHQ-9 Total Score MyChart 3 (Minimal depression)     PHQ-9 Total Score 3 12 5         4/2/2020    10:37 AM 2/7/2022     3:18 PM 1/20/2023    10:18 AM   JOSH-7 SCORE   Total Score 4 15 5     Tobacco Use Disorder:  No current medications  Current Tobacco Product use and Frequency: 2-3 cigarettes per day  Tried quitting in the past: No.   First use within 30 minutes of waking up? No  Pt not interested in pharmacotherapies  Will work on cutting back cigarette use further on her own  Pt aware there are meds available to help    BP Readings from Last 3 Encounters:   09/08/23 96/48   08/01/23 124/66   05/24/23 98/58     Pulse Readings from Last 3 Encounters:   09/08/23 74   08/01/23 75   05/24/23 93     Lab Results   Component Value Date    A1C 10.0 (H) 11/01/2023     (H) 10/19/2022     10/19/2022    POTASSIUM 3.9 10/19/2022    AILEEN 9.6 10/19/2022    CHLORIDE 102 10/19/2022    CO2 30 10/19/2022    BUN 18 10/19/2022    CR 0.50 (L) 10/19/2022    GFRESTIMATED >90 10/19/2022    CHOL 115 11/01/2023    LDL 60 11/01/2023    HDL 26 (L) 11/01/2023    TRIG 146 11/01/2023    B12 411 05/30/2012    UMALCR 45.93 (H) 09/26/2022    TSH 1.18 04/20/2023       ----------------  I spent 45 minutes with this patient today. All changes were made via collaborative practice agreement with Clara Ambriz PA-C. A copy of the visit note was provided to the patient's provider(s).    A summary of these recommendations was declined by the patient.    Blue Leary, PharmD, BCACP  Medication Therapy Management Provider  [phone]    Telemedicine Visit Details  Type of service:  Telephone visit  Start Time: 1:12 PM  End Time: 1:57 PM     Medication Therapy Recommendations  Type 2 diabetes mellitus without complication, without long-term current use of insulin (H)    Current Medication: liraglutide (VICTOZA PEN) 18 MG/3ML solution  (Discontinued)   Rationale: More effective medication available - Ineffective medication - Effectiveness   Recommendation: Change Medication - Mounjaro 5 MG/0.5ML Sopn   Status: Accepted per CPA          Rationale: Preventive therapy - Needs additional medication therapy - Indication   Recommendation: Start Medication - pneumococcal 0.5 ML Irasema injection   Status: Accepted per CPA

## 2023-11-09 ENCOUNTER — VIRTUAL VISIT (OUTPATIENT)
Dept: PHARMACY | Facility: CLINIC | Age: 63
End: 2023-11-09
Attending: PHYSICIAN ASSISTANT

## 2023-11-09 DIAGNOSIS — F32.0 MILD MAJOR DEPRESSION (H): ICD-10-CM

## 2023-11-09 DIAGNOSIS — F41.1 GAD (GENERALIZED ANXIETY DISORDER): ICD-10-CM

## 2023-11-09 DIAGNOSIS — I10 BENIGN ESSENTIAL HYPERTENSION WITH TARGET BLOOD PRESSURE BELOW 140/90: ICD-10-CM

## 2023-11-09 DIAGNOSIS — E11.9 TYPE 2 DIABETES MELLITUS WITHOUT COMPLICATION, WITHOUT LONG-TERM CURRENT USE OF INSULIN (H): Primary | ICD-10-CM

## 2023-11-09 PROCEDURE — 99207 PR NO CHARGE LOS: CPT | Performed by: PHARMACIST

## 2023-11-09 RX ORDER — TIRZEPATIDE 5 MG/.5ML
5 INJECTION, SOLUTION SUBCUTANEOUS
Qty: 2 ML | Refills: 0 | Status: SHIPPED | OUTPATIENT
Start: 2023-11-09 | End: 2023-11-30

## 2023-11-09 RX ORDER — SERTRALINE HYDROCHLORIDE 100 MG/1
200 TABLET, FILM COATED ORAL DAILY
Qty: 180 TABLET | Refills: 0 | Status: SHIPPED | OUTPATIENT
Start: 2023-11-09 | End: 2024-02-06

## 2023-11-09 NOTE — PATIENT INSTRUCTIONS
Gianni James, it was great talking with you today!     Recommendations from today's MTM visit:                                                      1. Stop Victoza and replace with Mounjaro    2. Ordered Freestyle Kandace 2 to mail order pharmacy to see if covered now    3. Pt due for diabetes eye exam and microalbumin assay    4. Pt due for several vaccines: Prevnar 20 , Shingrix series, and annual influenza vaccine    I value your experience and would be very grateful for your time with providing feedback on our clinic survey. You may receive a survey via email or text message in the next few days.     Next MTM visit: 11/30/23    To schedule another MTM appointment, please call the clinic directly or you may call the MTM scheduling line at [phone] or toll-free at [phone].     My Clinical Pharmacist's contact information:                                                      Please feel free to contact me with any questions or concerns you have.      Blue Leary, PharmD, [facility] [place]  Phone: [phone]

## 2023-11-09 NOTE — Clinical Note
JAYNE - switched to Mounjaro from [facility] and trying to see if I can get a CGM covered for her. Thanks for referral! Blue

## 2023-11-10 ENCOUNTER — TELEPHONE (OUTPATIENT)
Dept: FAMILY MEDICINE | Facility: CLINIC | Age: 63
End: 2023-11-10
Payer: MEDICARE

## 2023-11-10 DIAGNOSIS — E11.9 TYPE 2 DIABETES MELLITUS WITHOUT COMPLICATION, WITHOUT LONG-TERM CURRENT USE OF INSULIN (H): ICD-10-CM

## 2023-11-10 NOTE — TELEPHONE ENCOUNTER
CENTRAL PRIOR AUTHORIZATION  [phone]      PRIOR AUTHORIZATION DENIED    Medication: FREESTYLE DARLING 2 SENSOR [place]  Insurance Company: [employer] - Phone [phone] Fax [phone]  Denial Date: 11/10/2023  Denial Rational:     Note from payer: This request was denied under your Medicare Part D benefit; however, it may be covered under Medicare Part B.     For more information, talk to your prescriber or call 1-800-MEDICARE. You only have Part D coverage with [employer].     You asked for a continuous glucose monitor for your diabetes. [employer] follows Medicare rules. The rules say for coverage under Medicare Part D, the item must be a drug that has been approved by the U.S. Food and Drug Administration (FDA). The FDA considers a glucose monitor a device - not a drug.     Your request is not a covered benefit under Medicare Part D or your [employer] plan. The rule is in the Medicare Prescription Drug Benefit Manual (Chapter 6, Section 10.1).     Please contact your Part B carrier to check for coverage. If you think Medicare Part D should cover this drug for you, you may appeal.       Appeal Information:         Patient Notified: No Please note: Providers/Clinics are to notify the patients of the denial outcomes and steps going forward.

## 2023-11-13 ENCOUNTER — TELEPHONE (OUTPATIENT)
Dept: FAMILY MEDICINE | Facility: CLINIC | Age: 63
End: 2023-11-13
Payer: MEDICARE

## 2023-11-13 NOTE — TELEPHONE ENCOUNTER
Pharmacist is concerned with patient starting this medication at highest dose. Stated they do not think she had a starter dose. There is a language barrier and are worried the patient does not fully understand if they taught her about this medication.     PCP please advise if appropriate to start at highest dose.       Disp Refills Start End PRISCILA    tirzepatide (MOUNJARO) 5 MG/0.5ML pen 2 mL 0 11/9/2023  No   Sig - Route: Inject 5 mg Subcutaneous every 7 days - Subcutaneous   Sent to pharmacy as: Mounjaro 5 MG/0.5ML Subcutaneous Solution Pen-injector (tirzepatide)   Class: E-Prescribe   Order: [id number]   E-Prescribing Status: Receipt confirmed by pharmacy (11/9/2023  1:42 PM CST)     Belem Canales RN on 11/13/2023 at 1:21 PM

## 2023-11-14 NOTE — TELEPHONE ENCOUNTER
Spoke with [facility] - testing supplies for this patient need to be billed to Part B. Unfortunately they cannot bill Medicare part B at this pharmacy. Need to send to local pharmacy or potentially [facility]. Will try local pharmacy first.    Blue Leary, PharmD, [facility]  Medication Therapy Management Provider  [phone]

## 2023-11-14 NOTE — TELEPHONE ENCOUNTER
Blue Leary, [facility]  You2 hours ago (9:55 AM)     MARCY  Stephanieronnie, I did an injection teaching when patient met with me last week. We do want her to start at Mounjaro 5 mg dose because we are transitioning from max dose of Victoza to Mounjaro. Thus she likely doesn't need to start Mounjaro at lowest dose. Called pharmacy to provide them this information.    Blue Leary, PharmD, [facility]  Medication Therapy Management Provider  [phone]     No further action needed by Willy OVALLES as MTM has advised on this concern and called pharmacy to clarify dosing.     Sherly Laurent, RN on 11/14/2023 at 12:18 PM

## 2023-11-14 NOTE — TELEPHONE ENCOUNTER
Called pharmacy to confirm that they do have 2.5mg in stock. Looks like script was prescribed by MTM.     Injection teaching may be best done by Diabetic Education but in-clinic RN may teach this in the interim/before they get scheduled with diabetic ed.    Will also route to MTM as an FYI.     Sherly Laurent, RN on 11/14/2023 at 9:49 AM

## 2023-11-14 NOTE — TELEPHONE ENCOUNTER
5mg is not the highest dose, it's the second to the lowest. 2.5mg is the lowest. Do they have 2.5mg in stock?    Patient may need injection teaching - can this be done on the clinic side?

## 2023-11-15 DIAGNOSIS — E11.9 TYPE 2 DIABETES MELLITUS WITHOUT COMPLICATION, WITHOUT LONG-TERM CURRENT USE OF INSULIN (H): ICD-10-CM

## 2023-11-15 RX ORDER — PEN NEEDLE, DIABETIC 31 GX3/16"
NEEDLE, DISPOSABLE MISCELLANEOUS
Qty: 200 EACH | Refills: 1 | Status: SHIPPED | OUTPATIENT
Start: 2023-11-15 | End: 2023-12-19

## 2023-11-30 ENCOUNTER — VIRTUAL VISIT (OUTPATIENT)
Dept: PHARMACY | Facility: CLINIC | Age: 63
End: 2023-11-30
Payer: MEDICARE

## 2023-11-30 DIAGNOSIS — E11.9 TYPE 2 DIABETES MELLITUS WITHOUT COMPLICATION, WITHOUT LONG-TERM CURRENT USE OF INSULIN (H): ICD-10-CM

## 2023-11-30 PROCEDURE — 99207 PR NO CHARGE LOS: CPT | Performed by: PHARMACIST

## 2023-11-30 RX ORDER — INSULIN DETEMIR 100 [IU]/ML
27 INJECTION, SOLUTION SUBCUTANEOUS AT BEDTIME
Qty: 30 ML | Refills: 0 | Status: SHIPPED | OUTPATIENT
Start: 2023-11-30 | End: 2023-12-19

## 2023-11-30 RX ORDER — INSULIN DETEMIR 100 [IU]/ML
27 INJECTION, SOLUTION SUBCUTANEOUS AT BEDTIME
Qty: 24 ML | Refills: 0 | Status: SHIPPED | OUTPATIENT
Start: 2023-11-30 | End: 2023-11-30

## 2023-11-30 RX ORDER — TIRZEPATIDE 5 MG/.5ML
5 INJECTION, SOLUTION SUBCUTANEOUS
Qty: 2 ML | Refills: 0 | Status: SHIPPED | OUTPATIENT
Start: 2023-11-30 | End: 2023-12-28

## 2023-11-30 NOTE — Clinical Note
FYI - pt tolerating Mounjaro well and blood sugars responding nicely. Will continue to follow-up and titrate as tolerated. Pt is having difficulty scheduling diabetes eye exam. I placed a referral but is there anything else we can do to help coordinate? Let me know! Blue

## 2023-11-30 NOTE — PROGRESS NOTES
Medication Therapy Management (MTM) Encounter    ASSESSMENT:                            Medication Adherence/Access: No issues identified     Type 2 Diabetes: Patient is not meeting A1c goal of < 7%  and self monitoring of blood glucose is not at goal of fasting  mg/dL, but is improving. Pt is tolerating Mounjaro well, but hasn't been checking fasting blood sugars. Would be best to get fasting readings before titrating Mounjaro further.     Microalbumin: due. Last microalbumin was not at goal < 30 mg/g. Pt is on ACEI/ARB.  Aspirin: is appropriate in this patient for primary prevention  Statin: Patient is on moderate intensity statin which is indicated per 2019 ACC/AHA guidelines for lipid management due to the presence of diabetes.  Immunizations: Due for Prevnar 20 , Shingrix series, and annual influenza vaccine. Pt aware.  Annual Eye Exam: due. Pt hasn't scheduled visit because she thought it wasn't covered by insurance. Medicare dose cover one diabetes eye exam per year. Pt would benefit from referral to help coordinate.    PLAN:                            1. Continue Mounjaro 5 mg weekly for one more month. Can increase to 7.5 mg at next follow-up in tolerating well.    2. CGM is too expensive so patient prefers not to use. She will continue with glucometer. Pt advised to check blood sugars twice daily before our next appointment.    3. Placed order for diabetes eye exam. Will need to see if she was contacted for scheduling.    Follow-up: Return in 4 weeks (on 12/28/2023) for Follow up, with me, using a phone visit.    SUBJECTIVE/OBJECTIVE:                          Jacob Cai is a 62 year old female called for a follow-up visit from 11/9/23.  Visit was conducted with  ID [id number].     Reason for visit: follow-up on starting Mounjaro.    Allergies/ADRs: None  Past Medical History: Reviewed in chart  Tobacco: She reports that she has been smoking cigarettes. She has never used smokeless  tobacco.  Nicotine/Tobacco Cessation Plan: Pharmacotherapies : None. Pt working on minimizing use on her own.  Alcohol: none    Medication Adherence/Access:  denies concerns with cost of medications or adherence     Diabetes   Type 2 Diabetes  Metformin ER 1000 mg twice daily  Glipizide XL 10 mg twice daily  Mounjaro 5 mg weekly - has given 3 injections so far  Levemir 27 units in the evening (pt provided wrong dosing at last visit)  Aspirin 81mg daily  Statin: atorvastatin 20 mg daily    Started Mounjaro injections and stopped Victoza  This is going very well  Denies concerns with injection administration  Denies diarrhea, nausea, or GI upset  Pt is very happy with blood sugars since starting Mounjaro  Her readings after meals have improved a lot  She has not been checking fasting readings  She will start doing so prior to next visit    Blood sugar monitoring: checks after dinner with glucometer, readings have been 150-180s mg/dL. Lowest number so far was 102 mg/dL. CGM is covered but pt can't afford $50 copay. She will continue with glucometer.     Eye exam in the last 12 months? No - she hasn't been scheduling because she said the last eye exam from [facility] wasn't covered and was too expensive    BP Readings from Last 3 Encounters:   09/08/23 96/48   08/01/23 124/66   05/24/23 98/58     Pulse Readings from Last 3 Encounters:   09/08/23 74   08/01/23 75   05/24/23 93     Current Outpatient Medications   Medication Sig    tirzepatide (MOUNJARO) 5 MG/0.5ML pen Inject 5 mg Subcutaneous every 7 days    alcohol swab prep pads Use to swab area of injection/elvia as directed    amLODIPine (NORVASC) 10 MG tablet Take 1 tablet (10 mg) by mouth daily    aspirin (ASA) 81 MG EC tablet Take 81 mg by mouth every 24 hours    atorvastatin (LIPITOR) 20 MG tablet Take 1 tablet (20 mg) by mouth daily - stop simvastatin 8/1/23    blood glucose (NO BRAND SPECIFIED) test strip Use to test blood sugar 1 times daily or as directed.     diclofenac (VOLTAREN) 1 % topical gel Apply 2 g topically 4 times daily as needed for moderate pain    glipiZIDE (GLUCOTROL XL) 10 MG 24 hr tablet TAKE 2 TABLETS (20 MG) BY MOUTH DAILY    hydrochlorothiazide (HYDRODIURIL) 50 MG tablet Take 1 tablet (50 mg) by mouth daily    insulin detemir (LEVEMIR FLEXPEN/FLEXTOUCH) 100 UNIT/ML pen Inject 27 Units Subcutaneous at bedtime (Patient taking differently: Inject 24 Units Subcutaneous 2 times daily)    insulin pen needle (EASY TOUCH PEN NEEDLES) 31G X 5 MM miscellaneous USE PEN NEEDLES 2x day as directed    lisinopril (ZESTRIL) 40 MG tablet TAKE 1 TABLET (40 MG) BY MOUTH DAILY    metFORMIN (GLUCOPHAGE XR) 500 MG 24 hr tablet TAKE 2 TABLETS (1,000 MG) BY MOUTH 2 TIMES DAILY (WITH MEALS)    metoprolol succinate ER (TOPROL XL) 100 MG 24 hr tablet Take 1 tablet (100 mg) by mouth daily    sertraline (ZOLOFT) 100 MG tablet Take 2 tablets (200 mg) by mouth daily     No current facility-administered medications for this visit.     Lab Results   Component Value Date    A1C 10.0 (H) 11/01/2023     (H) 10/19/2022     10/19/2022    POTASSIUM 3.9 10/19/2022    AILEEN 9.6 10/19/2022    CHLORIDE 102 10/19/2022    CO2 30 10/19/2022    BUN 18 10/19/2022    CR 0.50 (L) 10/19/2022    GFRESTIMATED >90 10/19/2022    CHOL 115 11/01/2023    LDL 60 11/01/2023    HDL 26 (L) 11/01/2023    TRIG 146 11/01/2023    B12 411 05/30/2012    UMALCR 45.93 (H) 09/26/2022    TSH 1.18 04/20/2023       ----------------  I spent 25 minutes with this patient today. All changes were made via collaborative practice agreement with Clara Ambriz PA-C. A copy of the visit note was provided to the patient's provider(s).    A summary of these recommendations was sent via [employer].    Blue Leary, PharmD, BCACP  Medication Therapy Management Provider  [phone]    Telemedicine Visit Details  Type of service:  Telephone visit  Start Time: 1:35 PM  End Time: 2:00 PM     Medication Therapy  Recommendations  Type 2 diabetes mellitus without complication, without long-term current use of insulin (H)    Current Medication: tirzepatide (MOUNJARO) 5 MG/0.5ML pen (Discontinued)   Rationale: Medication requires monitoring - Needs additional monitoring   Recommendation: Self-Monitoring - Mounjaro 5 MG/0.5ML Sopn   Status: Patient Agreed - Adherence/Education

## 2023-12-01 NOTE — PATIENT INSTRUCTIONS
Gianni James, it was great talking with you today!     Recommendations from today's MTM visit:                                                      1. Continue Mounjaro 5 mg weekly for one more month. Can increase to 7.5 mg at next follow-up in tolerating well.    2. CGM is too expensive so patient prefers not to use. She will continue with glucometer. Pt advised to check blood sugars twice daily before our next appointment.    3. Placed order for diabetes eye exam    I value your experience and would be very grateful for your time with providing feedback on our clinic survey. You may receive a survey via email or text message in the next few days.     Next MTM visit: 12/28/23    To schedule another MTM appointment, please call the clinic directly or you may call the MTM scheduling line at [phone] or toll-free at [phone].     My Clinical Pharmacist's contact information:                                                      Please feel free to contact me with any questions or concerns you have.      Blue Leary, PharmD, BCACP  [facility]  Phone: [phone]

## 2023-12-19 ENCOUNTER — TELEPHONE (OUTPATIENT)
Dept: GASTROENTEROLOGY | Facility: CLINIC | Age: 63
End: 2023-12-19

## 2023-12-19 ENCOUNTER — OFFICE VISIT (OUTPATIENT)
Dept: FAMILY MEDICINE | Facility: CLINIC | Age: 63
End: 2023-12-19
Payer: MEDICARE

## 2023-12-19 VITALS
SYSTOLIC BLOOD PRESSURE: 130 MMHG | DIASTOLIC BLOOD PRESSURE: 66 MMHG | OXYGEN SATURATION: 95 % | HEIGHT: 64 IN | TEMPERATURE: 97.7 F | BODY MASS INDEX: 39.91 KG/M2 | HEART RATE: 84 BPM | WEIGHT: 233.8 LBS | RESPIRATION RATE: 24 BRPM

## 2023-12-19 DIAGNOSIS — Z78.9 NON-ENGLISH SPEAKING PATIENT: ICD-10-CM

## 2023-12-19 DIAGNOSIS — F33.1 MAJOR DEPRESSIVE DISORDER, RECURRENT EPISODE, MODERATE (H): ICD-10-CM

## 2023-12-19 DIAGNOSIS — Z12.11 SCREEN FOR COLON CANCER: ICD-10-CM

## 2023-12-19 DIAGNOSIS — Z12.11 COLON CANCER SCREENING: Primary | ICD-10-CM

## 2023-12-19 DIAGNOSIS — E78.5 HYPERLIPIDEMIA LDL GOAL <100: ICD-10-CM

## 2023-12-19 DIAGNOSIS — E11.9 TYPE 2 DIABETES MELLITUS WITHOUT COMPLICATION, WITHOUT LONG-TERM CURRENT USE OF INSULIN (H): Primary | ICD-10-CM

## 2023-12-19 DIAGNOSIS — I10 BENIGN ESSENTIAL HYPERTENSION WITH TARGET BLOOD PRESSURE BELOW 140/90: ICD-10-CM

## 2023-12-19 DIAGNOSIS — I50.9 CHRONIC CONGESTIVE HEART FAILURE, UNSPECIFIED HEART FAILURE TYPE (H): ICD-10-CM

## 2023-12-19 LAB
CREAT UR-MCNC: 50.3 MG/DL
MICROALBUMIN UR-MCNC: <12 MG/L
MICROALBUMIN/CREAT UR: NORMAL MG/G{CREAT}

## 2023-12-19 PROCEDURE — 82570 ASSAY OF URINE CREATININE: CPT | Performed by: NURSE PRACTITIONER

## 2023-12-19 PROCEDURE — 82043 UR ALBUMIN QUANTITATIVE: CPT | Performed by: NURSE PRACTITIONER

## 2023-12-19 PROCEDURE — 36415 COLL VENOUS BLD VENIPUNCTURE: CPT | Performed by: NURSE PRACTITIONER

## 2023-12-19 PROCEDURE — 99214 OFFICE O/P EST MOD 30 MIN: CPT | Performed by: NURSE PRACTITIONER

## 2023-12-19 PROCEDURE — 80048 BASIC METABOLIC PNL TOTAL CA: CPT | Performed by: NURSE PRACTITIONER

## 2023-12-19 RX ORDER — BISACODYL 5 MG/1
TABLET, DELAYED RELEASE ORAL
Qty: 4 TABLET | Refills: 0 | Status: SHIPPED | OUTPATIENT
Start: 2023-12-19 | End: 2024-01-23

## 2023-12-19 RX ORDER — INSULIN GLARGINE-YFGN 100 [IU]/ML
27 INJECTION, SOLUTION SUBCUTANEOUS AT BEDTIME
Qty: 30 ML | Refills: 1 | Status: SHIPPED | OUTPATIENT
Start: 2023-12-19 | End: 2023-12-27

## 2023-12-19 RX ORDER — HYDROCHLOROTHIAZIDE 50 MG/1
50 TABLET ORAL DAILY
Qty: 90 TABLET | Refills: 3 | Status: SHIPPED | OUTPATIENT
Start: 2023-12-19

## 2023-12-19 RX ORDER — METOPROLOL SUCCINATE 100 MG/1
100 TABLET, EXTENDED RELEASE ORAL DAILY
Qty: 90 TABLET | Refills: 3 | Status: SHIPPED | OUTPATIENT
Start: 2023-12-19

## 2023-12-19 RX ORDER — METFORMIN HCL 500 MG
1000 TABLET, EXTENDED RELEASE 24 HR ORAL 2 TIMES DAILY WITH MEALS
Qty: 360 TABLET | Refills: 1 | Status: SHIPPED | OUTPATIENT
Start: 2023-12-19 | End: 2024-06-17

## 2023-12-19 RX ORDER — RESPIRATORY SYNCYTIAL VIRUS VACCINE 120MCG/0.5
0.5 KIT INTRAMUSCULAR ONCE
Qty: 1 EACH | Refills: 0 | Status: CANCELLED | OUTPATIENT
Start: 2023-12-19 | End: 2023-12-19

## 2023-12-19 ASSESSMENT — PAIN SCALES - GENERAL: PAINLEVEL: NO PAIN (0)

## 2023-12-19 ASSESSMENT — PATIENT HEALTH QUESTIONNAIRE - PHQ9: SUM OF ALL RESPONSES TO PHQ QUESTIONS 1-9: 3

## 2023-12-19 NOTE — TELEPHONE ENCOUNTER
Pre visit planning completed.      Procedure details:    Patient scheduled for Colonoscopy  on 1/8/24.     Arrival time: 1010. Procedure time 1055    Pre op exam needed? N/A    Facility location: [facility] [place]; [address], [place] [address]    Sedation type: Conscious sedation     Indication for procedure:  Colon cancer screening       Chart review:     Electronic implanted devices? No    Recent diagnosis of diverticulitis within the last 6 weeks? No    Diabetic? Yes. See medication holding recommendations     Diabetic medication HOLDING recommendations: (if applicable)  Oral diabetic medications: Yes:  Glipizide (glucotrol): HOLD day of procedure.  Metformin (glucophage): HOLD day of procedure.  Diabetic injectables: Yes- Mounjaro (Tirzepatide).  Weekly dosing of medication.  Hold 7 days before procedure.  Follow up with managing provider.   Insulin: Yes. Consult with managing provider       Medication review:    Anticoagulants? No    NSAIDS? No NSAID medications per patient's medication list.  RN will verify with pre-assessment call.    Other medication HOLDING recommendations:  N/A      Prep for procedure:     Bowel prep recommendation: Extended prep Golytely    Due to:  BMI > 40.   40.16 recorded 12/19/2023 OV    Prep instructions sent via [employer] Bowel prep script sent to    [facility] #[id number] - [place] - [address] AT [facility]        Corinne Kliber, RN  Endoscopy Procedure Pre Assessment RN  [phone] option 4

## 2023-12-19 NOTE — LETTER
December 19, 2023      Jacob Cai  [address]  [place] [address]              Dear Jacob,    Colonoscopy      Procedure date: 1/8/24    Anticipated arrival time: 1010 AM   (Procedure Times are Subject to Change)    Facility location: [facility] [place]; [address], [place] [address] - Check in location: 2nd Floor at Surgery desk      Important Procedure Reminders:     Prep Instructions:   Instructions on how to prepare for your upcoming procedure are found below. Please read instructions carefully. Deviation from instructions may result in less than desired outcomes and procedure may need to be rescheduled. If you have additional questions regarding how to prepare for your upcoming procedure, please contact our endoscopy pre assessment nurses at [phone] option 4.      Policy:   On the day of your procedure, please designatean adult(s) who can drive you home stay with you for the next 24 hours. The medicines used in the exam will make you sleepy. You will not be able to drive. You cannot take public transportation, ride share services, or non-medical taxi service without a responsible caregiver.  Medical transport services are allowed with the requirement that a responsible caregiver will receive you at your destination.  We require that drivers and caregivers are confirmed prior to your procedure.    Day of procedure:  Please do not wear jewelry (i.e. earrings, rings, necklaces, watches, etc) .   Leave your purse, billfold, credit cards, and other valuables at home.   Bring insurance card and ID.     To cancel or reschedule your procedure:   Please call our endoscopy scheduling team at: [facility]: [phone], option 2. Monday through Friday, 7:00am-5:00pm.      Medication Reminders:    Please note the following medication holding recommendations:   Oral diabetic medication(s): Glipizide (glucotrol): HOLD day of  procedure.  Metformin (glucophage): HOLD day of procedure.  Injectable diabetic medication(s): Mounjaro (Tirzepatide).  Weekly dosing of medication.  Hold 7 days before procedure.  Follow up with managing provider.   Insulin.  Consult with your managing provider.     ----------------------------------------------------------------------------------------------------------------------------------------------------------------------------------------------------------------------------------------------------------------------    Golytely Extended Colonoscopy Prep  Prep instructions for colonoscopy   Pre-Assessment Phone Number: [facility]; [phone] option 4    Bowel prep has been sent to    [facility] #[id number] - [place] [id number] [facility]  AT [facility]      Please read these instructions carefully at least 7 days prior to your colonoscopy procedure. Be sure to follow all directions completely. The inside of your colon must be clean to allow for a complete examination for the presence of any growths, polyps, and/or abnormalities, as well as their biopsy or removal. A number of tips are included in order to make this part of the procedure as comfortable as possible.    Immediately:  You will receive a call from a Nurse to review instructions and health history.  This assessment must be completed prior to your procedure.  Failure to complete the Nurse assessment may result in the procedure being cancelled.  You must arrange for an adult to drive you home after your exam. Your colonoscopy cannot be done unless you have a ride. If you need to use public transportation, someone must ride with you and stay with you for a minimum of 6-24 hours.  Check with your insurance company to be sure they will cover this exam.Arrange for a responsible adult to drive you home on the day of the exam. This cannot be a taxi or a bus as you will need someone with you after  the procedure.    7 days prior:  Talk to your prescribing provider: If you take blood thinners (such as Coumadin, Plavix, Xarelto, Eliquis, Lovenox, or others), these medications may need to be stopped temporarily before your procedure. Your prescribing provider will tell you what to do.   Talk to your prescribing provider: If you take prescription NSAIDS (such as Sulindac, Celebrex, Mobic, Relafen). Your prescribing provider will tell you what to do.   If you have diabetes, you should request an early morning appointment.  Stop taking fiber supplements and multivitamins containing iron, or any other medications containing iron.  Fill your prescription for 2 containers of Golytely and 4 Dulcolax tablets at the pharmacy.  It is very important that you stay well hydrated during the colonoscopy prep. The Golytely bowel prep is designed to clean out your colon, but it will not provide hydration. While you are taking the prescribed prep, you should also drink 64 oz. of Gatorade or similar sports drink product to drink for staying hydrated. (Avoid red and purple colors)  Stop eating corn, popcorn, nuts and foods with seeds.   Begin a restricted or low fiber diet (see list below).    2 days prior:  Drink fluids to be well hydrated, this is important. Drink at least 4 large glasses of water, Gatorade, or other similar sports drinks.  It is a good idea to use Vaseline on the skin around your anus after each bowel movement to prevent irritation. Wet wipes also help to reduce irritation.   You can have a light, low-fiber breakfast. You may also have a light, low-fiber lunch.  No solid foods after 1pm. Begin a clear liquid diet. (see list below).  At 4pm, take 2 Dulcolax (bisacodyl) tablets.  At 5pm, mix and drink half of a jug of Golytely bowel prep. Drink an 8 oz. Glass of Golytely every 10-15 minutes until half of the jug is gone. Place the remainder of the Golytely in the refrigerator. Stay close to the bathroom.     1 day  prior:  Continue clear liquid diet only (see examples below). Do not eat solid food this day.  Do not drink any red or purple colored drinks.  Stop taking NSAID pain relievers, such as Advil, Ibuprofen, Motrin, etc.  You may take Tylenol.  At 4 pm, take 2 Dulcolax (bisacodyl) tablets.  At 5 pm, drink the 2nd half of a jug of Golytely bowel prep. Drink an 8 oz. glass of Golytely every 10-15 minutes until the 1st jug of Golytely is gone.   The Golytely bowel prep will not keep you hydrated. You should drink 8-10 glasses of clear liquids throughout the day.  Before you go to bed, mix the 2nd container of Golytely and place in the refrigerator for the morning.      Procedure day:  6 hours before your check-in time, drink an 8 oz. Glass of Golytely every 10-15 minutes until half of the 2nd jug of Golytely is gone. You WILL NOT drink the entire 2nd jug of Golytely.   You may take your necessary morning medications with sips of water  Do not take diabetes medicine by mouth until after your exam.  You may drink clear liquids only up until 2 hours before your arrival time.  Do not smoke, chew tobacco, or swallow anything, including water or gum for at least 2 hours before your arrival time. This is a safety issue. Your procedure could be cancelled if you do not follow directions.  Please do not wear jewelry (i.e. earrings, rings, necklaces, watches, etc) . Leave your purse, billfold, credit cards, and other valuables at home.   Please arrive with a responsible adult who can take you home after the test and stay with you for a minimum of 24 hours: The medicine used will make you sleepy and forgetful. If you do not have someone to take you home, we will cancel your procedure. If using public transportation you must have someone to ride with you.  Please perform your nebulizer treatments and airway clearance therapy in the morning prior to the procedure (if applicable).  If you have asthma, bring your inhalers.    CLEAR LIQUID  DIET   You may have:  Water, tea, coffee (no milk or cream)  Soda pop, Gatorade (not red or purple)  Jell-O, Popsicles (no milk or fruit pieces - not red or purple)  Fat-free soup broth or bouillon  Plain hard candy, such as clear life savers (not red or purple)  Clear juices and fruit-flavored drinks, such as apple juice, white grape juice, Hi-C, and Napoleon-Aid (not red or purple)   Do not have:  Milk or milk products such as ice cream, malts or shakes, or coffee creamer  Red or purple drinks of any kind such as cranberry juice or grape juice. Avoid red or purple Jell-O, Popsicles, Napoleon-Aid, sorbet, sherbet and candy  Juices with pulp such as orange, grapefruit, pineapple or tomato juice  Cream soups of any kind  Alcohol and beer  Protein drinks or protein powder     LOW FIBER DIET   You may have:    Starches: White bread, rolls, biscuits, croissants, Augustina toast, white flour tortillas, waffles, pancakes, Swedish toast; white rice, noodles, pasta, macaroni; cooked and peeled potatoes; plain crackers, saltines; cooked farina or cream of rice; puffed rice, corn flakes, Rice Krispies, Special K   Vegetables: tender cooked and canned, vegetable broths  Fruits and fruit juices: Strained fruit juice, canned fruit without seeds or skin (not pineapple), applesauce, pear sauce, ripe bananas, melons (not watermelon)   Milk products: Milk (plain or flavored), cheese, cottage cheese, yogurt (no berries), custard, ice cream    Proteins: Tender, well-cooked ground beef, lamb, veal, ham, pork, chicken, turkey, fish or organ meats; eggs; creamy peanut butter   Fats and condiments:  Margarine, butter, oils, mayonnaise, sour cream, salad dressing, plain gravy; spices, cooked herbs; sugar, clear jelly, honey, syrup   Snacks, sweets and drinks: Pretzels, hard candy; plain cakes and cookies (no nuts or seeds); gelatin, plain pudding, sherbet, Popsicles; coffee, tea, carbonated ( fizzy ) drinks Do not have:    Starches: Breads or rolls  that contain nuts, seeds or fruit; whole wheat or whole grain breads that contain more than 1 gram of fiber per slice; cornbread; corn or whole wheat tortillas; potatoes with skin; brown rice, wild rice, kasha (buckwheat), and oatmeal   Vegetables: Any raw or steamed vegetables; vegetables with seeds; corn in any form   Fruits and fruit juices: Prunes, prune juice, raisins and other dried fruits, berries and other fruits with seeds, canned pineapple juices with pulp such as orange, grapefruit, pineapple or tomato juice  Milk products: Any yogurt with nuts, seeds or berries   Proteins: Tough, fibrous meats with gristle; cooked dried beans, peas or lentils; crunchy peanut butter  Fats and condiments: Pickles, olives, relish, horseradish; jam, marmalade, preserves   Snacks, sweets and drinks: Popcorn, nuts, seeds, granola, coconut, candies made with nuts or seeds; all desserts that contain nuts, seeds, raisins and other dried fruits, coconut, whole grains or bran.      FAQ:    How do you know if your colon is cleaned out?   After completing the bowel prep, your bowel movements should be all liquid and yellow. Your bowel movements will look similar to urine in the toilet. If there are pieces of stool (poop) in the toilet, or if you can't see to the bottom of the toilet, please call our office for advice. Call [phone] and ask to speak with a nurse.   Why do you need a responsible  to take you home and stay with you?  We require a responsible adult to take you home for your safety. The sedation medicines used to relax you during the procedure can impair your judgement and reaction time, make you forgetful and possible a little unsteady. Do not drive, make any important decisions, or sign any legal documents for 24 hours after your procedure.   It is normal to feel bloated and gassy after your procedure. Walking will help move the air through your colon. You can take non-aspirin pain relievers that contain  acetaminophen (Tylenol).   When can you eat after your procedure?  You may resume your normal diet when you feel ready, unless advised otherwise by the doctor performing your procedure. Do not drink alcohol for 24 hours after your procedure.   You many resume normal activities (work, exercise, etc.) after 24 hours.   When will you get test results?  You should have your procedure results and any lab results (if applicable) by letter, [employer] message, or phone call within 2 weeks. If you have any questions, please call the doctor that referred you for the procedure.       Thank you for choosing [facility] for your procedure. If you are sent a survey regarding your care, please take the time to complete the questionnaire. We value your feedback!

## 2023-12-19 NOTE — PROGRESS NOTES
"  Assessment & Plan     Type 2 diabetes mellitus without complication, without long-term current use of insulin (H)    - Albumin Random Urine Quantitative with Creat Ratio; Future  - insulin pen needle (32G X 4 MM) 32G X 4 MM miscellaneous; Use bid pen needles daily or as directed.  - Insulin Glargine-yfgn (SEMGLEE, YFGN,) 100 UNIT/ML SOPN; Inject 27 Units Subcutaneous at bedtime  - metFORMIN (GLUCOPHAGE XR) 500 MG 24 hr tablet; Take 2 tablets (1,000 mg) by mouth 2 times daily (with meals)  - Albumin Random Urine Quantitative with Creat Ratio    Benign essential hypertension with target blood pressure below 140/90    - Albumin Random Urine Quantitative with Creat Ratio; Future  - BASIC METABOLIC PANEL; Future  - metoprolol succinate ER (TOPROL XL) 100 MG 24 hr tablet; Take 1 tablet (100 mg) by mouth daily  - hydrochlorothiazide (HYDRODIURIL) 50 MG tablet; Take 1 tablet (50 mg) by mouth daily  - BASIC METABOLIC PANEL  - Albumin Random Urine Quantitative with Creat Ratio    Screen for colon cancer      Chronic congestive heart failure, unspecified heart failure type (H)      Major depressive disorder, recurrent episode, moderate (H)      Hyperlipidemia LDL goal <100      Review of external notes as documented elsewhere in note  Ordering of each unique test  Prescription drug management  30 minutes spent by me on the date of the encounter doing chart review, history and exam, documentation and further activities per the note       BMI:   Estimated body mass index is 40.16 kg/m  as calculated from the following:    Height as of this encounter: 1.625 m (5' 3.98\").    Weight as of this encounter: 106.1 kg (233 lb 12.8 oz).   Weight management plan: Discussed healthy diet and exercise guidelines    Work on weight loss  Regular exercise  See Patient Instructions    SAMUEL CARROLL NP  [facility] [place] is a 63 year old, presenting for the following health issues:  Recheck " Medication        12/19/2023    10:14 AM   Additional Questions   Roomed by Josie   Accompanied by none     She reports she recently had her diabetic visit, she is here today because she received a letter from her insurance company-[employer]-stating that she is going to lose coverage of Levemir and it needs to be changed to one of the covered alternatives: semglee, tresiba; and she is losing coverage of the EZ touch and it needs to be changed to BD/HTL.    She denies any skin sores or nonhealing wounds.  She does have foot tingling or numbness at times not all the time.  Her breathing is doing fine, she denies chest pain, dizziness, shortness of breath.  She reports she used to smoke occasionally she does no longer smoke.  She is declining lung cancer screening.  She reports she had recent breast imaging and did not have cancer.  She checks her blood sugar once to twice a day.  She reports her blood sugars have been within normal range.  She does not check her blood pressures, again she denies chest pain, dizziness, frequent headaches.  Discussed colonoscopy screening she reports she has this scheduled January 8.  She is declining vaccines today.  Discussed labs that are needed for medication refills.  She reports she is tolerating her current medications for blood pressure, cholesterol, diabetes and mental health.  She has no other questions or concerns at this time    HPI       Diabetes Follow-up    How often are you checking your blood sugar? One time daily  What time of day are you checking your blood sugars (select all that apply)?   In the afternoon after eating  Have you had any blood sugars above 200?  Yes   Have you had any blood sugars below 70?  No  What symptoms do you notice when your blood sugar is low?  None  What concerns do you have today about your diabetes? Other: insurance covering medications for the next year    Do you have any of these symptoms? (Select all that apply)  No numbness or tingling  "in feet.  No redness, sores or blisters on feet.  No complaints of excessive thirst.  No reports of blurry vision.  No significant changes to weight.  Have you had a diabetic eye exam in the last 12 months? No        BP Readings from Last 2 Encounters:   12/19/23 130/66   09/08/23 96/48     Hemoglobin A1C (%)   Date Value   11/01/2023 10.0 (H)   08/01/2023 7.3 (H)   06/14/2021 8.1 (H)   02/16/2021 8.5 (H)     LDL Cholesterol Calculated (mg/dL)   Date Value   11/01/2023 60   01/20/2023 101 (H)   02/16/2021 110 (H)   01/28/2020 83         How many servings of fruits and vegetables do you eat daily?  2-3  On average, how many sweetened beverages do you drink each day (Examples: soda, juice, sweet tea, etc.  Do NOT count diet or artificially sweetened beverages)?   0  How many days per week do you exercise enough to make your heart beat faster? 3 or less  How many minutes a day do you exercise enough to make your heart beat faster? 9 or less  How many days per week do you miss taking your medication? 0        Review of Systems   Constitutional, HEENT, cardiovascular, pulmonary, GI, , musculoskeletal, neuro, skin, endocrine and psych systems are negative, except as otherwise noted.      Objective    /66   Pulse 84   Temp 97.7  F (36.5  C) (Temporal)   Resp 24   Ht 1.625 m (5' 3.98\")   Wt 106.1 kg (233 lb 12.8 oz)   LMP  (LMP Unknown)   SpO2 95%   BMI 40.16 kg/m    Body mass index is 40.16 kg/m .  Physical Exam   GENERAL: healthy, alert and no distress  EYES: Eyes grossly normal to inspection  RESP: lungs clear to auscultation - no rales, rhonchi or wheezes  CV: regular rate and rhythm, normal S1 S2, no S3 or S4, no murmur, click or rub, no peripheral edema and peripheral pulses strong  MS: no gross musculoskeletal defects noted, no edema, no CVA tenderness  PSYCH: mentation appears normal, affect normal/bright  Diabetic foot exam: normal DP and PT pulses, no trophic changes or ulcerative lesions, and " normal sensory exam    See orders

## 2023-12-19 NOTE — LETTER
My Heart Failure Action Plan  Name: Jacob Cai   YOB: 1960  Date: 12/19/2023   My doctor:   [facility] [place]   [address]  [place] [id number] [phone] My Diagnosis: Chronic congestive heart failure  My Ejection Fraction:   Lab Results   Component Value Date    LVEF 60-65% 11/10/2022     Over 50%  My Exercise Goal: Start exercise slowly - to begin, do a few minutes of exercise, several times a day. Increase your time and speed [id number] to build tolerance, with a goal of 30 minutes of exercise daily. Steady, slow, and consistent exercise is both safe and healthy. Stop and rest when you feel tired or become short of breath. Do not push yourself on days when you don t feel well.       My Weight Plan:   Wt Readings from Last 2 Encounters:   12/19/23 106.1 kg (233 lb 12.8 oz)   09/05/23 99.8 kg (220 lb)     Weigh yourself daily using the same scale. If you gain more than 2 pounds in 24 hours or 5 pounds in 7 days. call the clinic    My Diet Goal: No added salt    Emergency Room Visits:    Our goal is to improve your quality of life and help you avoid a visit to the emergency room or hospital.  If we work together, we can achieve this goal. But, if you feel you need to call 911 or go to the emergency room, please do so.  If you go to the emergency room, please bring your list of medicines and your daily weight chart with you.    Each day ask yourself:  Is my weight up?  Do I have swelling?  Do I have trouble breathing?  How did I sleep?  Other problems?       GREEN ZONE     Weight gained is no more than 2 pounds a day or 5 pounds a in 7 days.  No swelling in feet, ankles, legs or stomach.  No more swelling than usual.  No more trouble breathing than usual.  No change in my sleep.  No other problems. What should I do?  I am doing fine. I will take my medicine, follow my diet, see my doctor, exercise, and watch for symptoms.            YELLOW ZONE         Weight gain of more than 2 pounds in one day or 5 pounds in 7 days.  New swelling in ankle, leg, knee or thigh.  Bloating in belly, pants feel tighter.  Swelling in hands or face.  Coughing or trouble breathing while walking or talking.  Harder to breathe last night.  Have trouble sleeping, wake up short of breath.  Unusually tired.  Not eating.  Nausea, vomiting, or diarrhea  Pain in my chest or bad  leg cramps.  Feel weak or dizzy. What should I do?  I need to take action and call my doctor or nurse today.                 RED ZONE         Weight gain of 5 pounds overnight.  Chest pain or pressure that does not go away.  Feel less alert.  Wheezing or have trouble breathing when at rest.  Cannot sleep lying down.  Cannot take my medicines.  Pass out or faint. What should I do?  I need to call my doctor or nurse now!  Call 911 if I have chest pain or cannot breathe.

## 2023-12-19 NOTE — LETTER
December 20, 2023      Jacob Cai  [address] [place] [address]        Hi Jacob,     Thank you for your recent office visit.     Here are your recent results.  Normal kidney function.  It does appear like you are possibly mildly dehydrated with your labs.  Your glucose level is considered normal less than 127.  Urine albumin is normal does not show beginning stages of kidney damage.  Continue current medications.  Work on doing some type of daily exercise.     Feel free to contact me via [employer] or call the clinic at [phone].     Resulted Orders   BASIC METABOLIC PANEL   Result Value Ref Range    Sodium 136 135 - 145 mmol/L      Comment:      Reference intervals for this test were updated on 09/26/2023 to more accurately reflect our healthy population. There may be differences in the flagging of prior results with similar values performed with this method. Interpretation of those prior results can be made in the context of the updated reference intervals.     Potassium 4.2 3.4 - 5.3 mmol/L    Chloride 99 98 - 107 mmol/L    Carbon Dioxide (CO2) 24 22 - 29 mmol/L    Anion Gap 13 7 - 15 mmol/L    Urea Nitrogen 16.5 8.0 - 23.0 mg/dL    Creatinine 0.48 (L) 0.51 - 0.95 mg/dL    GFR Estimate >90 >60 mL/min/1.73m2    Calcium 9.4 8.8 - 10.2 mg/dL    Glucose 125 (H) 70 - 99 mg/dL   Albumin Random Urine Quantitative with Creat Ratio   Result Value Ref Range    Creatinine Urine mg/dL 50.3 mg/dL      Comment:      The reference ranges have not been established in urine creatinine. The results should be integrated into the clinical context for interpretation.    Albumin Urine mg/L <12.0 mg/L      Comment:      The reference ranges have not been established in urine albumin. The results should be integrated into the clinical context for interpretation.    Albumin Urine mg/g Cr        Comment:      Unable to calculate, urine albumin and/or urine creatinine is outside detectable limits.  Microalbuminuria is  defined as an albumin:creatinine ratio of 17 to 299 for males and 25 to 299 for females. A ratio of albumin:creatinine of 300 or higher is indicative of overt proteinuria.  Due to biologic variability, positive results should be confirmed by a second, first-morning random or 24-hour timed urine specimen. If there is discrepancy, a third specimen is recommended. When 2 out of 3 results are in the microalbuminuria range, this is evidence for incipient nephropathy and warrants increased efforts at glucose control, blood pressure control, and institution of therapy with an angiotensin-converting-enzyme (ACE) inhibitor (if the patient can tolerate it).         Sincerely,      Cass Hester NP

## 2023-12-20 LAB
ANION GAP SERPL CALCULATED.3IONS-SCNC: 13 MMOL/L (ref 7–15)
BUN SERPL-MCNC: 16.5 MG/DL (ref 8–23)
CALCIUM SERPL-MCNC: 9.4 MG/DL (ref 8.8–10.2)
CHLORIDE SERPL-SCNC: 99 MMOL/L (ref 98–107)
CREAT SERPL-MCNC: 0.48 MG/DL (ref 0.51–0.95)
DEPRECATED HCO3 PLAS-SCNC: 24 MMOL/L (ref 22–29)
EGFRCR SERPLBLD CKD-EPI 2021: >90 ML/MIN/1.73M2
GLUCOSE SERPL-MCNC: 125 MG/DL (ref 70–99)
POTASSIUM SERPL-SCNC: 4.2 MMOL/L (ref 3.4–5.3)
SODIUM SERPL-SCNC: 136 MMOL/L (ref 135–145)

## 2023-12-20 NOTE — RESULT ENCOUNTER NOTE
Please send letter      Gianni James,    Thank you for your recent office visit.    Here are your recent results.  Normal kidney function.  It does appear like you are possibly mildly dehydrated with your labs.  Your glucose level is considered normal less than 127.  Urine albumin is normal does not show beginning stages of kidney damage.  Continue current medications.  Work on doing some type of daily exercise.    Feel free to contact me via [employer] or call the clinic at [phone].    Sincerely,    MARCO Martinez, FNP-BC

## 2023-12-20 NOTE — TELEPHONE ENCOUNTER
Pre assessment completed for upcoming procedure with Yudithroderick  id [id number]  (Please see previous telephone encounter notes for complete details)        Procedure details:    Arrival time and facility location reviewed.    Pre op exam needed? N/A    Designated  policy reviewed. Instructed to have someone stay 6 hours post procedure.     COVID policy reviewed.      Medication review:    Medications reviewed. Please see supporting documentation below. Holding recommendations discussed (if applicable).   Oral diabetic medication(s): Glipizide (glucotrol): HOLD day of procedure.  Metformin (glucophage): HOLD day of procedure.  Injectable diabetic medication(s): Mounjaro (Tirzepatide).  Weekly dosing of medication.  Hold 7 days before procedure.  Follow up with managing provider.   Insulin.  Consult with your managing provider.       Prep for procedure:     Procedure prep instructions reviewed.        Additional information needed?  N/A      Patient  verbalized understanding and had no questions or concerns at this time.      Sujata Curiel RN  Endoscopy Procedure Pre Assessment RN  [phone] option 4

## 2023-12-22 ENCOUNTER — TELEPHONE (OUTPATIENT)
Dept: FAMILY MEDICINE | Facility: CLINIC | Age: 63
End: 2023-12-22
Payer: MEDICARE

## 2023-12-22 NOTE — TELEPHONE ENCOUNTER
Prior Authorization Retail Medication Request    Medication/Dose: Insulin Glargine-yfgn (SEMGLEE, YFGN,) 100 UNIT/ML SOPN  Diagnosis and ICD code (if different than what is on RX):  E11.9   New/renewal/insurance change PA/secondary ins. PA:  Previously Tried and Failed:  na  Rationale:  na    Insurance   Primary:  MEDICARE  Insurance ID:  [id number]     Secondary (if applicable):na  Insurance ID:  na    Pharmacy Information (if different than what is on RX)  Name:   [facility]  Phone:  [phone]  Fax:  [phone]

## 2023-12-28 ENCOUNTER — VIRTUAL VISIT (OUTPATIENT)
Dept: PHARMACY | Facility: CLINIC | Age: 63
End: 2023-12-28

## 2023-12-28 DIAGNOSIS — E11.9 TYPE 2 DIABETES MELLITUS WITHOUT COMPLICATION, WITHOUT LONG-TERM CURRENT USE OF INSULIN (H): ICD-10-CM

## 2023-12-28 PROCEDURE — 99207 PR NO CHARGE LOS: CPT | Performed by: PHARMACIST

## 2023-12-28 RX ORDER — TIRZEPATIDE 5 MG/.5ML
5 INJECTION, SOLUTION SUBCUTANEOUS
Qty: 2 ML | Refills: 1 | Status: SHIPPED | OUTPATIENT
Start: 2023-12-28 | End: 2024-01-23 | Stop reason: DRUGHIGH

## 2023-12-28 RX ORDER — GLIPIZIDE 10 MG/1
20 TABLET, FILM COATED, EXTENDED RELEASE ORAL DAILY
Qty: 180 TABLET | Refills: 1 | Status: SHIPPED | OUTPATIENT
Start: 2023-12-28 | End: 2024-06-17

## 2023-12-28 NOTE — Clinical Note
FYI - pt doing very well on Mounjaro. No changes made today. I helped relay instructions about diabetes meds during colonoscopy prep because pt was unclear on that. Thanks! Blue

## 2023-12-28 NOTE — PROGRESS NOTES
Medication Therapy Management (MTM) Encounter    ASSESSMENT:                            Medication Adherence/Access: No issues identified     Type 2 Diabetes: Patient is not meeting A1c goal of < 7% but self monitoring of blood glucose is at goal of post-prandial readings less than 180 mg/dL. Pt has not been checking fasting readings so it is difficult to assess if insulin or glipizide doses need to be adjusted. Pt would benefit from checking fasting readings. Pt also inquired about what to do with diabetes meds during colonoscopy.     Microalbumin: up to date. Last microalbumin was at goal <30 mg/g. Pt is on ACEI/ARB.  Aspirin: is appropriate in this patient for primary prevention  Statin: Patient is on moderate intensity statin which is indicated per 2019 ACC/AHA guidelines for lipid management due to the presence of diabetes.  Immunizations: Due for Prevnar 20 , Shingrix series, and annual influenza vaccine. Pt declines vaccines  Annual Eye Exam: due. Pt hasn't scheduled visit yet    PLAN:                            1. Pts daughter will call [phone] to schedule diabetes eye exam    2. Pt forgot to check fasting readings last month but will do this going forward    3. No changes to diabetes medications today until we can evaluate fasting readings    4. Relayed instructions from GI about diabetes meds during colonoscopy  - skip Mounjaro dose on 1/3/23 and resume Wedneday after procedure  - reduce insulin by 50% once you start diabetes prep  - hold glipizide and metformin day of colonoscopy    5. Pt aware that Lantus is her new insulin. She will use up Levemir and switch to Lantus after that.    Follow-up: Return in 26 days (on 1/23/2024) for Follow up, with me, using a phone visit.    SUBJECTIVE/OBJECTIVE:                          Jacob Cai is a 63 year old female called for a follow-up visit from 11/30/23. [id number]    Reason for visit: follow-up on diabetes  after starting Mounjaro.    Allergies/ADRs: None  Past Medical History: Reviewed in chart  Tobacco: She reports that she has quit smoking. Her smoking use included cigarettes. She has never used smokeless tobacco.  Alcohol: none    Medication Adherence/Access: no issues reported  Diabetes   Type 2 Diabetes  Metformin ER 1000 mg twice daily  Glipizide XL 10 mg twice daily  Mounjaro 5 mg weekly on Wednesdays  Levemir 27 units in the evening - insurance not covering next year, switching to Lantus  Aspirin 81 mg daily  Statin: atorvastatin 20 mg daily    Tolerating Mounjaro well  Denies diarrhea, nausea, or GI upset  Does endorse appetite suppression  Pt is very happy with blood sugars since starting Mounjaro  She is minimizing carbohydrates  Eating mostly vegetables  Tries to eat eggs and fish for protein  Pt has upcoming colonoscopy and asked what to do with diabetes meds during this time  Went over instructions from GI note  Pt also wondering what to do with Levemir since insurance isn't covering anymore  Explained that the new insulin is called Lantus  Pt verbalized understanding  Has 2 doses left of Levemir and then will switch to Lantus after that    Blood sugar monitoring: checks with glucometer 1-2 hours after breakfast, readings have been  mg/dL in the last few weeks. CGM is covered but pt can't afford $50 copay.      Eye exam in the last 12 months? No - needs to schedule. Provided phone number for [facility] clinic. Pt said her daughter will call.    BP Readings from Last 3 Encounters:   12/19/23 130/66   09/08/23 96/48   08/01/23 124/66     Pulse Readings from Last 3 Encounters:   12/19/23 84   09/08/23 74   08/01/23 75     Current Outpatient Medications   Medication Sig    alcohol swab prep pads Use to swab area of injection/elvia as directed    amLODIPine (NORVASC) 10 MG tablet Take 1 tablet (10 mg) by mouth daily    aspirin (ASA) 81 MG EC tablet Take 81 mg by mouth every 24 hours    atorvastatin  (LIPITOR) 20 MG tablet Take 1 tablet (20 mg) by mouth daily - stop simvastatin 8/1/23    blood glucose (NO BRAND SPECIFIED) test strip Use to test blood sugar 1 times daily or as directed.    diclofenac (VOLTAREN) 1 % topical gel Apply 2 g topically 4 times daily as needed for moderate pain    glipiZIDE (GLUCOTROL XL) 10 MG 24 hr tablet TAKE 2 TABLETS (20 MG) BY MOUTH DAILY    hydrochlorothiazide (HYDRODIURIL) 50 MG tablet Take 1 tablet (50 mg) by mouth daily    insulin glargine (LANTUS PEN) 100 UNIT/ML pen Inject 27 Units Subcutaneous at bedtime    insulin pen needle (32G X 4 MM) 32G X 4 MM miscellaneous Use bid pen needles daily or as directed.    lisinopril (ZESTRIL) 40 MG tablet TAKE 1 TABLET (40 MG) BY MOUTH DAILY    metFORMIN (GLUCOPHAGE XR) 500 MG 24 hr tablet Take 2 tablets (1,000 mg) by mouth 2 times daily (with meals)    metoprolol succinate ER (TOPROL XL) 100 MG 24 hr tablet Take 1 tablet (100 mg) by mouth daily    sertraline (ZOLOFT) 100 MG tablet Take 2 tablets (200 mg) by mouth daily    tirzepatide (MOUNJARO) 5 MG/0.5ML pen Inject 5 mg Subcutaneous every 7 days     No current facility-administered medications for this visit.     Lab Results   Component Value Date    A1C 10.0 (H) 11/01/2023     (H) 12/19/2023     12/19/2023    POTASSIUM 4.2 12/19/2023    AILEEN 9.4 12/19/2023    CHLORIDE 99 12/19/2023    CO2 24 12/19/2023    BUN 16.5 12/19/2023    CR 0.48 (L) 12/19/2023    GFRESTIMATED >90 12/19/2023    CHOL 115 11/01/2023    LDL 60 11/01/2023    HDL 26 (L) 11/01/2023    TRIG 146 11/01/2023    B12 411 05/30/2012    UMALCR  12/19/2023      Comment:      Unable to calculate, urine albumin and/or urine creatinine is outside detectable limits.  Microalbuminuria is defined as an albumin:creatinine ratio of 17 to 299 for males and 25 to 299 for females. A ratio of albumin:creatinine of 300 or higher is indicative of overt proteinuria.  Due to biologic variability, positive results should be  confirmed by a second, first-morning random or 24-hour timed urine specimen. If there is discrepancy, a third specimen is recommended. When 2 out of 3 results are in the microalbuminuria range, this is evidence for incipient nephropathy and warrants increased efforts at glucose control, blood pressure control, and institution of therapy with an angiotensin-converting-enzyme (ACE) inhibitor (if the patient can tolerate it).      TSH 1.18 04/20/2023     ----------------  I spent 28 minutes with this patient today. All changes were made via collaborative practice agreement with Clara Ambriz PA-C. A copy of the visit note was provided to the patient's provider(s).    A summary of these recommendations was mailed to the patient    Blue Leary, PharmD, [facility]  Medication Therapy Management Provider  [phone]    Telemedicine Visit Details  Type of service:  Telephone visit  Start Time: 1:32 PM  End Time:  2:00 PM     Medication Therapy Recommendations  Type 2 diabetes mellitus without complication, without long-term current use of insulin (H)    Current Medication: insulin glargine (LANTUS PEN) 100 UNIT/ML pen   Rationale: Medication requires monitoring - Needs additional monitoring   Recommendation: Self-Monitoring   Status: Patient Agreed - Adherence/Education          Current Medication: insulin glargine (LANTUS PEN) 100 UNIT/ML pen   Rationale: Does not understand instructions - Adherence - Adherence   Recommendation: Provide Education   Status: Patient Agreed - Adherence/Education

## 2023-12-28 NOTE — TELEPHONE ENCOUNTER
Central Prior Authorization Team   Phone: [phone]    Provider sent new rx for preferred alternative - Lantus Pens. I will close this request.

## 2024-01-02 DIAGNOSIS — I10 BENIGN ESSENTIAL HYPERTENSION WITH TARGET BLOOD PRESSURE BELOW 140/90: ICD-10-CM

## 2024-01-03 RX ORDER — AMLODIPINE BESYLATE 10 MG/1
10 TABLET ORAL DAILY
Qty: 90 TABLET | Refills: 1 | Status: SHIPPED | OUTPATIENT
Start: 2024-01-03 | End: 2024-01-23

## 2024-01-05 RX ORDER — NALOXONE HYDROCHLORIDE 0.4 MG/ML
0.4 INJECTION, SOLUTION INTRAMUSCULAR; INTRAVENOUS; SUBCUTANEOUS
Status: CANCELLED | OUTPATIENT
Start: 2024-01-05

## 2024-01-05 RX ORDER — ONDANSETRON 2 MG/ML
4 INJECTION INTRAMUSCULAR; INTRAVENOUS EVERY 6 HOURS PRN
Status: CANCELLED | OUTPATIENT
Start: 2024-01-05

## 2024-01-05 RX ORDER — FLUMAZENIL 0.1 MG/ML
0.2 INJECTION, SOLUTION INTRAVENOUS
Status: CANCELLED | OUTPATIENT
Start: 2024-01-05 | End: 2024-01-06

## 2024-01-05 RX ORDER — NALOXONE HYDROCHLORIDE 0.4 MG/ML
0.2 INJECTION, SOLUTION INTRAMUSCULAR; INTRAVENOUS; SUBCUTANEOUS
Status: CANCELLED | OUTPATIENT
Start: 2024-01-05

## 2024-01-05 RX ORDER — PROCHLORPERAZINE MALEATE 10 MG
10 TABLET ORAL EVERY 6 HOURS PRN
Status: CANCELLED | OUTPATIENT
Start: 2024-01-05

## 2024-01-05 RX ORDER — ONDANSETRON 4 MG/1
4 TABLET, ORALLY DISINTEGRATING ORAL EVERY 6 HOURS PRN
Status: CANCELLED | OUTPATIENT
Start: 2024-01-05

## 2024-01-08 ENCOUNTER — HOSPITAL ENCOUNTER (OUTPATIENT)
Facility: AMBULATORY SURGERY CENTER | Age: 64
Discharge: HOME | End: 2024-01-08
Attending: INTERNAL MEDICINE | Admitting: INTERNAL MEDICINE
Payer: MEDICARE

## 2024-01-08 ENCOUNTER — TELEPHONE (OUTPATIENT)
Dept: GASTROENTEROLOGY | Facility: CLINIC | Age: 64
End: 2024-01-08
Payer: MEDICARE

## 2024-01-08 VITALS
TEMPERATURE: 97.9 F | DIASTOLIC BLOOD PRESSURE: 88 MMHG | HEART RATE: 71 BPM | RESPIRATION RATE: 16 BRPM | SYSTOLIC BLOOD PRESSURE: 136 MMHG | OXYGEN SATURATION: 94 %

## 2024-01-08 DIAGNOSIS — Z86.0100 HISTORY OF COLON POLYPS: Primary | ICD-10-CM

## 2024-01-08 DIAGNOSIS — I10 BENIGN ESSENTIAL HYPERTENSION WITH TARGET BLOOD PRESSURE BELOW 140/90: ICD-10-CM

## 2024-01-08 DIAGNOSIS — Z12.11 COLON CANCER SCREENING: ICD-10-CM

## 2024-01-08 LAB
COLONOSCOPY: NORMAL
GLUCOSE BLDC GLUCOMTR-MCNC: 118 MG/DL (ref 70–99)

## 2024-01-08 PROCEDURE — G8907 PT DOC NO EVENTS ON DISCHARG: HCPCS

## 2024-01-08 PROCEDURE — G8918 PT W/O PREOP ORDER IV AB PRO: HCPCS

## 2024-01-08 PROCEDURE — G0121 COLON CA SCRN NOT HI RSK IND: HCPCS

## 2024-01-08 RX ORDER — LIDOCAINE 40 MG/G
CREAM TOPICAL
Status: DISCONTINUED | OUTPATIENT
Start: 2024-01-08 | End: 2024-01-09 | Stop reason: HOSPADM

## 2024-01-08 RX ORDER — FENTANYL CITRATE 50 UG/ML
INJECTION, SOLUTION INTRAMUSCULAR; INTRAVENOUS PRN
Status: DISCONTINUED | OUTPATIENT
Start: 2024-01-08 | End: 2024-01-08 | Stop reason: HOSPADM

## 2024-01-08 RX ORDER — ONDANSETRON 2 MG/ML
4 INJECTION INTRAMUSCULAR; INTRAVENOUS
Status: DISCONTINUED | OUTPATIENT
Start: 2024-01-08 | End: 2024-01-09 | Stop reason: HOSPADM

## 2024-01-08 NOTE — OR NURSING
Used telephone for [facility] . In person never showed up. Daughter took off and left the [facility].  is in chair and fell asleep.

## 2024-01-08 NOTE — TELEPHONE ENCOUNTER
Caller: En[place]-RN at   Reason for Reschedule/Cancellation (please be detailed, any staff messages or encounters to note?): Incomplete prep-reschedule tomorrow with Dr Damian      Prior to reschedule please review:  Ordering Provider: Akilah  Sedation per order: CS  Does patient have any ASC Exclusions, please identify?: N      Notes on Cancelled Procedure:  Procedure: Lower Endoscopy [Colonoscopy]   Date: 1/8/24  Location: [facility]; [address], [place] [address]  Surgeon: Akilah      Rescheduled: Yes  Procedure: Lower Endoscopy [Colonoscopy]   Date: 1/9/24  Location: [facility]; [address], [place] [address]  Surgeon: Nati  Sedation Level Scheduled  CS,  Reason for Sedation Level Protocol  Prep/Instructions updated and sent: Yes       Send In - basket message to Panc - Husam Pool if EUS  procedure is canceled or rescheduled: [ N/A, YES or NO] NA

## 2024-01-08 NOTE — OR NURSING
Pt had poor prep for colonoscopy today. [facility] and  offered - remain on clear liquids today and repeat prep this evening and return tomorrow at 1015 for 1100. They accepted this. To remain on clear ligs,  Rx prep from pharmacy, mix with water, drink 1/2 of solution at 6 pm as instructed, drink remaining prep at midnight as instructed. May have water up til 0600, than NPO.  Monitor blood sugar. Stop diabetic medications, can resume after procedure. To return 1/9/24 at 1015.

## 2024-01-08 NOTE — H&P
[facility] Anesthesia Pre-op History and Physical    Jacob Cai MRN# [id number]   Age: 63 year old YOB: 1960            Date of Exam 1/8/2024         Primary care provider: Clara Ambriz         Chief Complaint and/or Reason for Procedure:     History of multiple colon polyps on previous colonoscopy in 2009 - no colonoscopy since         Active problem list:     Patient Active Problem List    Diagnosis Date Noted    Chronic congestive heart failure, unspecified heart failure type (H) 12/19/2023     Priority: Medium    Major depressive disorder, recurrent episode, moderate (H) 12/19/2023     Priority: Medium    Non-English speaking patient 12/19/2023     Priority: Medium    Intraductal papilloma of left breast 08/22/2023     Priority: Medium    Benign essential hypertension with target blood pressure below 140/90 11/15/2019     Priority: Medium    History of thyroidectomy, total 03/31/2015     Priority: Medium     Patient reported in 1997; she is unable to remember which location she had   this done. Says she has not required thyroid supplementation since.      JOSH (generalized anxiety disorder) 01/02/2013     Priority: Medium     Diagnosis updated by automated process. Provider to review and confirm.      Type 2 diabetes mellitus without complication, without long-term current use of insulin (H) 07/06/2012     Priority: Medium    Hyperlipidemia LDL goal <100 06/18/2012     Priority: Medium    Morbid obesity (H) 05/16/2011     Priority: Medium    Degenerative joint disease of knee 04/29/2011     Priority: Medium    Mild major depression (H24)      Priority: Medium    GERD (gastroesophageal reflux disease) 09/21/2009     Priority: Medium            Medications (include herbals and vitamins):   Any Plavix use in the last 7 days? No     Current Outpatient Medications   Medication Sig    amLODIPine (NORVASC) 10 MG tablet TAKE 1 TABLET (10 MG) BY MOUTH DAILY    aspirin (ASA) 81 MG EC  tablet Take 81 mg by mouth every 24 hours    atorvastatin (LIPITOR) 20 MG tablet Take 1 tablet (20 mg) by mouth daily - stop simvastatin 8/1/23    bisacodyl (DULCOLAX) 5 MG EC tablet Two days prior to exam take two (2) tablets at 4pm. One day prior to exam take two (2) tablets at 4pm    diclofenac (VOLTAREN) 1 % topical gel Apply 2 g topically 4 times daily as needed for moderate pain    glipiZIDE (GLUCOTROL XL) 10 MG 24 hr tablet Take 2 tablets (20 mg) by mouth daily    hydrochlorothiazide (HYDRODIURIL) 50 MG tablet Take 1 tablet (50 mg) by mouth daily    insulin glargine (LANTUS PEN) 100 UNIT/ML pen Inject 27 Units Subcutaneous at bedtime    lisinopril (ZESTRIL) 40 MG tablet TAKE 1 TABLET (40 MG) BY MOUTH DAILY    metFORMIN (GLUCOPHAGE XR) 500 MG 24 hr tablet Take 2 tablets (1,000 mg) by mouth 2 times daily (with meals)    metoprolol succinate ER (TOPROL XL) 100 MG 24 hr tablet Take 1 tablet (100 mg) by mouth daily    sertraline (ZOLOFT) 100 MG tablet Take 2 tablets (200 mg) by mouth daily    tirzepatide (MOUNJARO) 5 MG/0.5ML pen Inject 5 mg Subcutaneous every 7 days    alcohol swab prep pads Use to swab area of injection/elvia as directed    blood glucose (NO BRAND SPECIFIED) test strip Use to test blood sugars 2 times daily or as directed.    insulin pen needle (32G X 4 MM) 32G X 4 MM miscellaneous Use bid pen needles daily or as directed.    polyethylene glycol (GOLYTELY) 236 g suspension Take as directed. Two days before your exam fill the first container with water. Cover and shake until mixed well. At 5:00pm drink one 8oz glass every 10-15 minutes until half (1/2) of the first container is empty. Store the remainder in the refrigerator. One day before your exam at 5:00pm drink the second half of the first container until it is gone. Before you go to bed mix the second container with water and put in refrigerator. Six hours before your check in time drink one 8oz glass every 10-15 minutes until half of  container is empty. Discard the remainder of solution.     Current Facility-Administered Medications   Medication    lidocaine (LMX4) kit    lidocaine 1 % 0.1-1 mL    ondansetron (ZOFRAN) injection 4 mg    sodium chloride (PF) 0.9% PF flush 3 mL    sodium chloride (PF) 0.9% PF flush 3 mL             Allergies:    No Known Allergies  Allergy to Latex? No  Allergy to tape?   No  Intolerances:             Physical Exam:   All vitals have been reviewed  Patient Vitals for the past 8 hrs:   BP Temp Temp src Pulse Resp SpO2   01/08/24 0945 130/60 97.9  F (36.6  C) Temporal 81 20 93 %     No intake/output data recorded.  Lungs:   No increased work of breathing, good air exchange, clear to auscultation bilaterally, no crackles or wheezing     Cardiovascular:   normal S1 and S2             Lab / Radiology Results:            Anesthetic risk and/or ASA classification:   2    Breonna Isbell, DO

## 2024-01-09 ENCOUNTER — HOSPITAL ENCOUNTER (OUTPATIENT)
Facility: AMBULATORY SURGERY CENTER | Age: 64
Discharge: HOME | End: 2024-01-09
Attending: SURGERY | Admitting: SURGERY
Payer: MEDICARE

## 2024-01-09 VITALS
OXYGEN SATURATION: 99 % | RESPIRATION RATE: 16 BRPM | DIASTOLIC BLOOD PRESSURE: 69 MMHG | SYSTOLIC BLOOD PRESSURE: 138 MMHG | HEART RATE: 75 BPM | TEMPERATURE: 97.2 F

## 2024-01-09 LAB
COLONOSCOPY: NORMAL
GLUCOSE BLDC GLUCOMTR-MCNC: 113 MG/DL (ref 70–99)

## 2024-01-09 PROCEDURE — G8907 PT DOC NO EVENTS ON DISCHARG: HCPCS

## 2024-01-09 PROCEDURE — 45385 COLONOSCOPY W/LESION REMOVAL: CPT | Mod: PT

## 2024-01-09 PROCEDURE — G8918 PT W/O PREOP ORDER IV AB PRO: HCPCS

## 2024-01-09 PROCEDURE — 88305 TISSUE EXAM BY PATHOLOGIST: CPT | Performed by: PATHOLOGY

## 2024-01-09 RX ORDER — PROCHLORPERAZINE MALEATE 10 MG
10 TABLET ORAL EVERY 6 HOURS PRN
Status: DISCONTINUED | OUTPATIENT
Start: 2024-01-09 | End: 2024-01-10 | Stop reason: HOSPADM

## 2024-01-09 RX ORDER — NALOXONE HYDROCHLORIDE 0.4 MG/ML
0.4 INJECTION, SOLUTION INTRAMUSCULAR; INTRAVENOUS; SUBCUTANEOUS
Status: DISCONTINUED | OUTPATIENT
Start: 2024-01-09 | End: 2024-01-10 | Stop reason: HOSPADM

## 2024-01-09 RX ORDER — ONDANSETRON 2 MG/ML
4 INJECTION INTRAMUSCULAR; INTRAVENOUS EVERY 6 HOURS PRN
Status: DISCONTINUED | OUTPATIENT
Start: 2024-01-09 | End: 2024-01-10 | Stop reason: HOSPADM

## 2024-01-09 RX ORDER — ONDANSETRON 4 MG/1
4 TABLET, ORALLY DISINTEGRATING ORAL EVERY 6 HOURS PRN
Status: DISCONTINUED | OUTPATIENT
Start: 2024-01-09 | End: 2024-01-10 | Stop reason: HOSPADM

## 2024-01-09 RX ORDER — NALOXONE HYDROCHLORIDE 0.4 MG/ML
0.2 INJECTION, SOLUTION INTRAMUSCULAR; INTRAVENOUS; SUBCUTANEOUS
Status: DISCONTINUED | OUTPATIENT
Start: 2024-01-09 | End: 2024-01-10 | Stop reason: HOSPADM

## 2024-01-09 RX ORDER — FLUMAZENIL 0.1 MG/ML
0.2 INJECTION, SOLUTION INTRAVENOUS
Status: ACTIVE | OUTPATIENT
Start: 2024-01-09 | End: 2024-01-09

## 2024-01-09 RX ORDER — FENTANYL CITRATE 50 UG/ML
INJECTION, SOLUTION INTRAMUSCULAR; INTRAVENOUS PRN
Status: DISCONTINUED | OUTPATIENT
Start: 2024-01-09 | End: 2024-01-09 | Stop reason: HOSPADM

## 2024-01-09 NOTE — LETTER
Jacob Cai  [address]  [place] [address]  January 12, 2024    Dear Jacob,  This letter is to inform you of the results of your pathology report from your colonoscopy.  Your pathology report was:  Final Diagnosis   A(1).  ASCENDING COLON POLYPS X2:  - Tubular adenoma(s); negative for high-grade dysplasia   B(2).  TRANSVERSE COLON POLYP:  - Colonic mucosa with lymphoid aggregate  - No evidence of neoplastic polyp    These are benign polyps. These types of polyps do carry a small risk of developing into a cancer over time if not removed. Yours were completely removed at the time of your colonoscopy. You should have another surveillance colonoscopy in 3 years.  If you have further questions please don t hesitate to call our clinic at [phone].   Sincerely,     Rudy Damian, DO

## 2024-01-09 NOTE — H&P
Patient seen for Endoscopy    HPI:  Patient is a 63 year old female w hx polyps here for endoscopy. Not taking blood thinning medications. No MI or CVA history. No issues with previous sedation. No recent acute illness.    Review Of Systems    Skin: negative  Ears/Nose/Throat: negative  Respiratory: No shortness of breath, dyspnea on exertion, cough, or hemoptysis  Cardiovascular: negative  Gastrointestinal: negative  Genitourinary: negative  Musculoskeletal: negative  Neurologic: negative  Hematologic/Lymphatic/Immunologic: negative  Endocrine: negative      Past Medical History:   Diagnosis Date    Arthritis     Chronic congestive heart failure, unspecified heart failure type (H) 12/19/2023    Diabetes (H)     Difficulty walking     GERD (gastroesophageal reflux disease) 09/21/2009    Hypertension        Past Surgical History:   Procedure Laterality Date    ABDOMEN SURGERY  2007    Tumor in the stomach    HYSTERECTOMY, PAP NO LONGER INDICATED      HYSTERECTOMY, [facility]  8/2006    LUMPECTOMY BREAST Left 9/8/2023    Procedure: Left Lumpectomy after Wire Localization;  Surgeon: Liliana Gruber MD;  Location: [facility]       Family History   Problem Relation Age of Onset    No Known Problems Mother     No Known Problems Father     No Known Problems Brother     No Known Problems Sister        Social History     Socioeconomic History    Marital status:      Spouse name: Not on file    Number of children: Not on file    Years of education: Not on file    Highest education level: Not on file   Occupational History    Not on file   Tobacco Use    Smoking status: Former     Types: Cigarettes    Smokeless tobacco: Never   Vaping Use    Vaping Use: Never used   Substance and Sexual Activity    Alcohol use: No    Drug use: No    Sexual activity: Not Currently     Partners: Male     Birth control/protection: Surgical     Comment: [facility]   Other Topics Concern    Parent/sibling w/ CABG, MI or angioplasty before 65F 55M?  Not Asked     Service No    Blood Transfusions No    Caffeine Concern No    Occupational Exposure No    Hobby Hazards No    Sleep Concern Yes    Stress Concern Yes    Weight Concern No    Special Diet No    Back Care No    Exercise Yes     Comment: occasional    Bike Helmet No     Comment: N/A    Seat Belt Yes     Comment: 100%    Self-Exams No   Social History Narrative    Not on file     Social Determinants of Health     Financial Resource Strain: Not on file   Food Insecurity: Not on file   Transportation Needs: Not on file   Physical Activity: Not on file   Stress: Not on file   Social Connections: Not on file   Interpersonal Safety: Low Risk  (12/19/2023)    Interpersonal Safety     Do you feel physically and emotionally safe where you currently live?: Yes     Within the past 12 months, have you been hit, slapped, kicked or otherwise physically hurt by someone?: No     Within the past 12 months, have you been humiliated or emotionally abused in other ways by your partner or ex-partner?: No   Housing Stability: Not on file       Current Outpatient Medications   Medication Sig Dispense Refill    alcohol swab prep pads Use to swab area of injection/elvia as directed 100 each 3    amLODIPine (NORVASC) 10 MG tablet TAKE 1 TABLET (10 MG) BY MOUTH DAILY 90 tablet 1    aspirin (ASA) 81 MG EC tablet Take 81 mg by mouth every 24 hours      atorvastatin (LIPITOR) 20 MG tablet Take 1 tablet (20 mg) by mouth daily - stop simvastatin 8/1/23 90 tablet 1    bisacodyl (DULCOLAX) 5 MG EC tablet Two days prior to exam take two (2) tablets at 4pm. One day prior to exam take two (2) tablets at 4pm 4 tablet 0    blood glucose (NO BRAND SPECIFIED) test strip Use to test blood sugars 2 times daily or as directed. 300 strip 4    diclofenac (VOLTAREN) 1 % topical gel Apply 2 g topically 4 times daily as needed for moderate pain 50 g 0    glipiZIDE (GLUCOTROL XL) 10 MG 24 hr tablet Take 2 tablets (20 mg) by mouth daily 180 tablet  1    hydrochlorothiazide (HYDRODIURIL) 50 MG tablet Take 1 tablet (50 mg) by mouth daily 90 tablet 3    insulin glargine (LANTUS PEN) 100 UNIT/ML pen Inject 27 Units Subcutaneous at bedtime 15 mL 0    insulin pen needle (32G X 4 MM) 32G X 4 MM miscellaneous Use bid pen needles daily or as directed. 180 each 1    lisinopril (ZESTRIL) 40 MG tablet TAKE 1 TABLET (40 MG) BY MOUTH DAILY 90 tablet 1    metFORMIN (GLUCOPHAGE XR) 500 MG 24 hr tablet Take 2 tablets (1,000 mg) by mouth 2 times daily (with meals) 360 tablet 1    metoprolol succinate ER (TOPROL XL) 100 MG 24 hr tablet Take 1 tablet (100 mg) by mouth daily 90 tablet 3    sertraline (ZOLOFT) 100 MG tablet Take 2 tablets (200 mg) by mouth daily 180 tablet 0    tirzepatide (MOUNJARO) 5 MG/0.5ML pen Inject 5 mg Subcutaneous every 7 days 2 mL 1       Medications and history reviewed    Physical exam:  Vitals: BP (!) 154/70   Temp 97.5  F (36.4  C) (Temporal)   Resp 20   LMP  (LMP Unknown)   SpO2 94%   BMI= There is no height or weight on file to calculate BMI.    Constitutional: Healthy, alert, non-distressed   Head: Normo-cephalic, atraumatic, no lesions, masses or tenderness   Cardiovascular: RRR, no new murmurs, +S1, +S2 heart sounds, no clicks, rubs or gallops   Respiratory: CTAB, no rales, rhonchi or wheezing, equal chest rise, good respiratory effort   Gastrointestinal: Soft, non-tender, non distended, no rebound rigidity or guarding, no masses or hernias palpated   : Deferred  Musculoskeletal: Moves all extremities, normal  strength, no deformities noted   Skin: No suspicious lesions or rashes   Psychiatric: Mentation appears normal, affect appropriate   Hematologic/Lymphatic/Immunologic: Normal cervical and supraclavicular lymph nodes   Patient able to get up on table without difficulty.    Labs show:  No results found for this or any previous visit (from the past 24 hour(s)).    Assessment: Endoscopy  Plan: Pt cleared for anesthesia for proposed  procedure.    Rudy Damian, DO

## 2024-01-10 RX ORDER — LISINOPRIL 40 MG/1
40 TABLET ORAL DAILY
Qty: 90 TABLET | Refills: 1 | Status: SHIPPED | OUTPATIENT
Start: 2024-01-10 | End: 2024-06-17

## 2024-01-12 ENCOUNTER — TELEPHONE (OUTPATIENT)
Dept: SURGERY | Facility: CLINIC | Age: 64
End: 2024-01-12
Payer: MEDICARE

## 2024-01-12 NOTE — TELEPHONE ENCOUNTER
Letter sent to patient with Dr Damian's recommendation from recent procedure.  [facility]

## 2024-01-23 ENCOUNTER — VIRTUAL VISIT (OUTPATIENT)
Dept: PHARMACY | Facility: CLINIC | Age: 64
End: 2024-01-23

## 2024-01-23 DIAGNOSIS — E11.9 TYPE 2 DIABETES MELLITUS WITHOUT COMPLICATION, WITHOUT LONG-TERM CURRENT USE OF INSULIN (H): Primary | ICD-10-CM

## 2024-01-23 DIAGNOSIS — I10 BENIGN ESSENTIAL HYPERTENSION WITH TARGET BLOOD PRESSURE BELOW 140/90: ICD-10-CM

## 2024-01-23 PROCEDURE — 99207 PR NO CHARGE LOS: CPT | Mod: 93 | Performed by: PHARMACIST

## 2024-01-23 RX ORDER — AMLODIPINE BESYLATE 10 MG/1
10 TABLET ORAL DAILY
Qty: 90 TABLET | Refills: 1 | Status: SHIPPED | OUTPATIENT
Start: 2024-01-23 | End: 2024-06-17

## 2024-01-23 RX ORDER — TIRZEPATIDE 7.5 MG/.5ML
7.5 INJECTION, SOLUTION SUBCUTANEOUS
Qty: 2 ML | Refills: 1 | Status: SHIPPED | OUTPATIENT
Start: 2024-01-23 | End: 2024-02-14

## 2024-01-23 NOTE — PATIENT INSTRUCTIONS
Gianni James, it was great talking with you today!     Recommendations from today's MTM visit:                                                      1. Increase Mounjaro to 7.5 mg weekly    2. Reduce Levemir to 20 units daily if fasting readings drop below 90 mg/dL    3. Have your daughter call [facility] pharmacy to refill atorvastatin, metformin, and glipizide    4. Recheck A1C on 2/5 - lab appt scheduled    I value your experience and would be very grateful for your time with providing feedback on our clinic survey. You may receive a survey via email or text message in the next few days.     Next MTM visit: 2/15/24    To schedule another MTM appointment, please call the clinic directly or you may call the MTM scheduling line at [phone] or toll-free at [phone].     My Clinical Pharmacist's contact information:                                                      Please feel free to contact me with any questions or concerns you have.      Blue Leary, [place], [facility]  Phone: [phone]

## 2024-01-23 NOTE — Clinical Note
FYI - blood sugars responding extremely well to Mounjaro. Both fasting and post prandial readings at goal. Scheduled her for A1C recheck in Feb. Thanks! Blue

## 2024-01-23 NOTE — PROGRESS NOTES
Medication Therapy Management (MTM) Encounter    ASSESSMENT:                            Medication Adherence/Access: No issues identified     Type 2 Diabetes: Patient is not meeting A1c goal of < 7% but self monitoring of blood glucose is at goal of fasting readings  mg/dL. Pt due to recheck A1C. Pt is seeking additional weight loss and prefers to increase Mounjaro. May need to reduce Levemir dose after dose increase of Mounjaro. Of note, pt unsure how to request refills from the pharmacy and would benefit from education on this.     Microalbumin: up to date. Last microalbumin was at goal <30 mg/g. Pt is on ACEI/ARB.  Aspirin: is appropriate in this patient for primary prevention  Statin: Patient is on moderate intensity statin which is indicated per 2019 ACC/AHA guidelines for lipid management due to the presence of diabetes.  Immunizations: Due for Prevnar 20 , Shingrix series, and annual influenza vaccine. Pt declines vaccines  Annual Eye Exam: due. Pt hasn't scheduled visit yet    Hypertension: Stable. Patient is meeting blood pressure goal of < 140/90mmHg.    PLAN:                            1. Increase Mounjaro to 7.5 mg weekly    2. Reduce Levemir to 20 units daily if fasting readings drop below 90 mg/dL    3. Advised pt to have her daughter call the pharmacy to refill atorvastatin, metformin, and glipizide    4. Helped patient schedule lab only visit to recheck A1C on 2/5    Follow-up: Return in 23 days (on 2/15/2024) for Follow up, with me, using a phone visit.    SUBJECTIVE/OBJECTIVE:                          Jacob Cai is a 63 year old female called for a follow-up visit from 12/28/23. Patient seen with [facility]  ID [id number].    Reason for visit: follow-up on Mounjaro.    Allergies/ADRs: None  Past Medical History: Reviewed in chart  Tobacco: She reports that she has quit smoking. Her smoking use included cigarettes. She has never used smokeless tobacco.  Alcohol: none     Medication  Adherence/Access: no issues reported  Diabetes   Type 2 Diabetes  Metformin ER 1000 mg twice daily  Glipizide XL 10 mg twice daily  Mounjaro 5 mg weekly on Wednesdays  Levemir 27 units in the evening - has to switch to Lantus this year per ins  Aspirin 81 mg daily  Statin: atorvastatin 20 mg daily    Tolerating Mounjaro well  Denies diarrhea, nausea, hypoglycemia, or GI upset  Does endorse appetite suppression  Pt is very happy with blood sugars since starting Mounjaro  Pt is seeking additional weight loss  Requested dose increase of Mounjaro  Of note, she is going to run out of metformin and glipizide and atorvastatin  She didn't know these had refills at [facility]  Advised she call [facility] to request the refills  Pt verbalized understanding    Diet: minimizing carbohydrates, eating mostly vegetables. Tries to eat eggs and fish for protein. Doesn't really eat meat    Blood sugar monitoring: checks with glucometer twice daily, fasting and 1-2 hours after breakfast. CGM is covered but pt can't afford $50 copay.     FBG Lunch/2hrs pp   93 129   104 130   111 145   117       Hypertension:   Amlodipine 10 mg daily   Hydrochlorothiazide 50 mg daily  Lisinopril 40 mg daily  Metoprolol  mg daily    Patient denies dizziness, dry cough, edema, or fatigue  Patient self monitors blood pressure.    Home readings 120-140/65-70 mmHg  No concerns at this time    BP Readings from Last 3 Encounters:   01/09/24 138/69   01/08/24 136/88   12/19/23 130/66     Pulse Readings from Last 3 Encounters:   01/09/24 75   01/08/24 71   12/19/23 84     Current Outpatient Medications   Medication Sig    alcohol swab prep pads Use to swab area of injection/elvia as directed    amLODIPine (NORVASC) 10 MG tablet TAKE 1 TABLET (10 MG) BY MOUTH DAILY    aspirin (ASA) 81 MG EC tablet Take 81 mg by mouth every 24 hours    atorvastatin (LIPITOR) 20 MG tablet Take 1 tablet (20 mg) by mouth daily - stop simvastatin 8/1/23    bisacodyl (DULCOLAX) 5  MG EC tablet Two days prior to exam take two (2) tablets at 4pm. One day prior to exam take two (2) tablets at 4pm    blood glucose (NO BRAND SPECIFIED) test strip Use to test blood sugars 2 times daily or as directed.    diclofenac (VOLTAREN) 1 % topical gel Apply 2 g topically 4 times daily as needed for moderate pain    glipiZIDE (GLUCOTROL XL) 10 MG 24 hr tablet Take 2 tablets (20 mg) by mouth daily    hydrochlorothiazide (HYDRODIURIL) 50 MG tablet Take 1 tablet (50 mg) by mouth daily    insulin glargine (LANTUS PEN) 100 UNIT/ML pen Inject 27 Units Subcutaneous at bedtime    insulin pen needle (32G X 4 MM) 32G X 4 MM miscellaneous Use bid pen needles daily or as directed.    lisinopril (ZESTRIL) 40 MG tablet TAKE 1 TABLET (40 MG) BY MOUTH DAILY    metFORMIN (GLUCOPHAGE XR) 500 MG 24 hr tablet Take 2 tablets (1,000 mg) by mouth 2 times daily (with meals)    metoprolol succinate ER (TOPROL XL) 100 MG 24 hr tablet Take 1 tablet (100 mg) by mouth daily    sertraline (ZOLOFT) 100 MG tablet Take 2 tablets (200 mg) by mouth daily    tirzepatide (MOUNJARO) 5 MG/0.5ML pen Inject 5 mg Subcutaneous every 7 days     No current facility-administered medications for this visit.     Lab Results   Component Value Date    A1C 10.0 (H) 11/01/2023     (H) 01/09/2024     12/19/2023    POTASSIUM 4.2 12/19/2023    AILEEN 9.4 12/19/2023    CHLORIDE 99 12/19/2023    CO2 24 12/19/2023    BUN 16.5 12/19/2023    CR 0.48 (L) 12/19/2023    GFRESTIMATED >90 12/19/2023    CHOL 115 11/01/2023    LDL 60 11/01/2023    HDL 26 (L) 11/01/2023    TRIG 146 11/01/2023    B12 411 05/30/2012    UMALCR  12/19/2023      Comment:      Unable to calculate, urine albumin and/or urine creatinine is outside detectable limits.  Microalbuminuria is defined as an albumin:creatinine ratio of 17 to 299 for males and 25 to 299 for females. A ratio of albumin:creatinine of 300 or higher is indicative of overt proteinuria.  Due to biologic variability,  positive results should be confirmed by a second, first-morning random or 24-hour timed urine specimen. If there is discrepancy, a third specimen is recommended. When 2 out of 3 results are in the microalbuminuria range, this is evidence for incipient nephropathy and warrants increased efforts at glucose control, blood pressure control, and institution of therapy with an angiotensin-converting-enzyme (ACE) inhibitor (if the patient can tolerate it).      TSH 1.18 04/20/2023     ----------------  I spent 22 minutes with this patient today. All changes were made via collaborative practice agreement with Clara Ambriz PA-C. A copy of the visit note was provided to the patient's provider(s).    A summary of these recommendations was declined by the patient.    Blue Leary, PharmD, [facility]  Medication Therapy Management Provider  [phone]    Telemedicine Visit Details  Type of service:  Telephone visit  Start Time: 1:35 PM  End Time: 1:57 PM     Medication Therapy Recommendations  Type 2 diabetes mellitus without complication, without long-term current use of insulin (H)    Current Medication: tirzepatide (MOUNJARO) 5 MG/0.5ML pen (Discontinued)   Rationale: Dose too low - Dosage too low - Effectiveness   Recommendation: Increase Dose - Mounjaro 7.5 MG/0.5ML Sopn   Status: Accepted per CPA

## 2024-01-29 ENCOUNTER — TELEPHONE (OUTPATIENT)
Dept: FAMILY MEDICINE | Facility: CLINIC | Age: 64
End: 2024-01-29

## 2024-01-29 DIAGNOSIS — E11.9 TYPE 2 DIABETES MELLITUS WITHOUT COMPLICATION, WITHOUT LONG-TERM CURRENT USE OF INSULIN (H): Primary | ICD-10-CM

## 2024-01-29 NOTE — TELEPHONE ENCOUNTER
Prior Authorization Retail Medication Request    Medication/Dose: tirzepatide (MOUNJARO) 7.5 MG/0.5ML pen  Diagnosis and ICD code (if different than what is on RX):  E11.9   New/renewal/insurance change PA/secondary ins. PA:  Previously Tried and Failed:  na  Rationale:  na    Insurance   Primary: MEDICARE  Insurance ID:  [id number]     Secondary (if applicable):na  Insurance ID:  na    Pharmacy Information (if different than what is on RX)  Name:   [facility]  Phone:  [phone]  Fax:    [phone]

## 2024-02-05 ENCOUNTER — LAB (OUTPATIENT)
Dept: LAB | Facility: CLINIC | Age: 64
End: 2024-02-05
Payer: MEDICARE

## 2024-02-05 DIAGNOSIS — E11.9 TYPE 2 DIABETES MELLITUS WITHOUT COMPLICATION, WITHOUT LONG-TERM CURRENT USE OF INSULIN (H): ICD-10-CM

## 2024-02-05 LAB — HBA1C MFR BLD: 7.4 % (ref 0–5.6)

## 2024-02-05 PROCEDURE — 83036 HEMOGLOBIN GLYCOSYLATED A1C: CPT

## 2024-02-05 PROCEDURE — 36415 COLL VENOUS BLD VENIPUNCTURE: CPT

## 2024-02-06 DIAGNOSIS — F32.0 MILD MAJOR DEPRESSION (H): ICD-10-CM

## 2024-02-06 RX ORDER — SERTRALINE HYDROCHLORIDE 100 MG/1
200 TABLET, FILM COATED ORAL DAILY
Qty: 180 TABLET | Refills: 0 | Status: SHIPPED | OUTPATIENT
Start: 2024-02-06 | End: 2024-05-10

## 2024-02-09 NOTE — TELEPHONE ENCOUNTER
Central Prior Authorization Team   Phone: [phone]    PA Initiation    Medication: tirzepatide (MOUNJARO) 7.5 MG/0.5ML pen  Insurance Company: [employer] Part D - Phone [phone] Fax [phone]  Pharmacy Filling the Rx: [id number] [address]  [facility]  Filling Pharmacy Phone: [phone]  Filling Pharmacy Fax:    Start Date: 2/9/2024

## 2024-02-13 ENCOUNTER — OFFICE VISIT (OUTPATIENT)
Dept: FAMILY MEDICINE | Facility: CLINIC | Age: 64
End: 2024-02-13
Payer: MEDICARE

## 2024-02-13 ENCOUNTER — ANCILLARY PROCEDURE (OUTPATIENT)
Dept: ULTRASOUND IMAGING | Facility: CLINIC | Age: 64
End: 2024-02-13
Attending: PHYSICIAN ASSISTANT
Payer: MEDICARE

## 2024-02-13 ENCOUNTER — ANCILLARY PROCEDURE (OUTPATIENT)
Dept: GENERAL RADIOLOGY | Facility: CLINIC | Age: 64
End: 2024-02-13
Attending: PHYSICIAN ASSISTANT
Payer: MEDICARE

## 2024-02-13 VITALS
TEMPERATURE: 98.1 F | HEART RATE: 84 BPM | WEIGHT: 240.4 LBS | OXYGEN SATURATION: 92 % | DIASTOLIC BLOOD PRESSURE: 64 MMHG | RESPIRATION RATE: 24 BRPM | BODY MASS INDEX: 41.04 KG/M2 | SYSTOLIC BLOOD PRESSURE: 120 MMHG | HEIGHT: 64 IN

## 2024-02-13 DIAGNOSIS — M79.89 LEG SWELLING: ICD-10-CM

## 2024-02-13 DIAGNOSIS — D72.828 OTHER ELEVATED WHITE BLOOD CELL (WBC) COUNT: ICD-10-CM

## 2024-02-13 DIAGNOSIS — R60.0 BILATERAL LEG EDEMA: Primary | ICD-10-CM

## 2024-02-13 DIAGNOSIS — R60.0 BILATERAL LEG EDEMA: ICD-10-CM

## 2024-02-13 DIAGNOSIS — R09.89 DECREASED PULSES IN FEET: ICD-10-CM

## 2024-02-13 DIAGNOSIS — I50.9 CHRONIC CONGESTIVE HEART FAILURE, UNSPECIFIED HEART FAILURE TYPE (H): ICD-10-CM

## 2024-02-13 DIAGNOSIS — M79.662 PAIN IN BOTH LOWER LEGS: ICD-10-CM

## 2024-02-13 DIAGNOSIS — E66.01 MORBID OBESITY (H): ICD-10-CM

## 2024-02-13 DIAGNOSIS — M79.661 PAIN IN BOTH LOWER LEGS: ICD-10-CM

## 2024-02-13 DIAGNOSIS — I50.41 ACUTE COMBINED SYSTOLIC (CONGESTIVE) AND DIASTOLIC (CONGESTIVE) HEART FAILURE (H): ICD-10-CM

## 2024-02-13 LAB
ANION GAP SERPL CALCULATED.3IONS-SCNC: 13 MMOL/L (ref 7–15)
BASOPHILS # BLD AUTO: 0.1 10E3/UL (ref 0–0.2)
BASOPHILS NFR BLD AUTO: 0 %
BUN SERPL-MCNC: 20.8 MG/DL (ref 8–23)
CALCIUM SERPL-MCNC: 9.8 MG/DL (ref 8.8–10.2)
CHLORIDE SERPL-SCNC: 100 MMOL/L (ref 98–107)
CREAT SERPL-MCNC: 0.72 MG/DL (ref 0.51–0.95)
CRP SERPL-MCNC: 3.38 MG/L
DEPRECATED HCO3 PLAS-SCNC: 26 MMOL/L (ref 22–29)
EGFRCR SERPLBLD CKD-EPI 2021: >90 ML/MIN/1.73M2
EOSINOPHIL # BLD AUTO: 0.5 10E3/UL (ref 0–0.7)
EOSINOPHIL NFR BLD AUTO: 4 %
ERYTHROCYTE [DISTWIDTH] IN BLOOD BY AUTOMATED COUNT: 13.4 % (ref 10–15)
ERYTHROCYTE [SEDIMENTATION RATE] IN BLOOD BY WESTERGREN METHOD: 13 MM/HR (ref 0–30)
GLUCOSE SERPL-MCNC: 192 MG/DL (ref 70–99)
HCT VFR BLD AUTO: 44.5 % (ref 35–47)
HGB BLD-MCNC: 14.2 G/DL (ref 11.7–15.7)
IMM GRANULOCYTES # BLD: 0 10E3/UL
IMM GRANULOCYTES NFR BLD: 0 %
LYMPHOCYTES # BLD AUTO: 3 10E3/UL (ref 0.8–5.3)
LYMPHOCYTES NFR BLD AUTO: 26 %
MCH RBC QN AUTO: 29 PG (ref 26.5–33)
MCHC RBC AUTO-ENTMCNC: 31.9 G/DL (ref 31.5–36.5)
MCV RBC AUTO: 91 FL (ref 78–100)
MONOCYTES # BLD AUTO: 1.1 10E3/UL (ref 0–1.3)
MONOCYTES NFR BLD AUTO: 10 %
NEUTROPHILS # BLD AUTO: 6.7 10E3/UL (ref 1.6–8.3)
NEUTROPHILS NFR BLD AUTO: 59 %
NT-PROBNP SERPL-MCNC: 56 PG/ML (ref 0–900)
PLATELET # BLD AUTO: 323 10E3/UL (ref 150–450)
POTASSIUM SERPL-SCNC: 4.2 MMOL/L (ref 3.4–5.3)
RBC # BLD AUTO: 4.9 10E6/UL (ref 3.8–5.2)
SODIUM SERPL-SCNC: 139 MMOL/L (ref 135–145)
URATE SERPL-MCNC: 5.9 MG/DL (ref 2.4–5.7)
WBC # BLD AUTO: 11.3 10E3/UL (ref 4–11)

## 2024-02-13 PROCEDURE — 85025 COMPLETE CBC W/AUTO DIFF WBC: CPT | Performed by: PHYSICIAN ASSISTANT

## 2024-02-13 PROCEDURE — 36415 COLL VENOUS BLD VENIPUNCTURE: CPT | Performed by: PHYSICIAN ASSISTANT

## 2024-02-13 PROCEDURE — 80048 BASIC METABOLIC PNL TOTAL CA: CPT | Performed by: PHYSICIAN ASSISTANT

## 2024-02-13 PROCEDURE — 99214 OFFICE O/P EST MOD 30 MIN: CPT | Performed by: PHYSICIAN ASSISTANT

## 2024-02-13 PROCEDURE — 93970 EXTREMITY STUDY: CPT | Mod: TC | Performed by: RADIOLOGY

## 2024-02-13 PROCEDURE — 85652 RBC SED RATE AUTOMATED: CPT | Performed by: PHYSICIAN ASSISTANT

## 2024-02-13 PROCEDURE — 84550 ASSAY OF BLOOD/URIC ACID: CPT | Performed by: PHYSICIAN ASSISTANT

## 2024-02-13 PROCEDURE — 86140 C-REACTIVE PROTEIN: CPT | Performed by: PHYSICIAN ASSISTANT

## 2024-02-13 PROCEDURE — 71046 X-RAY EXAM CHEST 2 VIEWS: CPT | Mod: TC | Performed by: RADIOLOGY

## 2024-02-13 PROCEDURE — 83880 ASSAY OF NATRIURETIC PEPTIDE: CPT | Performed by: PHYSICIAN ASSISTANT

## 2024-02-13 RX ORDER — PREDNISONE 20 MG/1
20 TABLET ORAL 2 TIMES DAILY
Qty: 14 TABLET | Refills: 0 | Status: SHIPPED | OUTPATIENT
Start: 2024-02-13 | End: 2024-02-20

## 2024-02-13 RX ORDER — CEPHALEXIN 500 MG/1
500 CAPSULE ORAL 2 TIMES DAILY
Qty: 20 CAPSULE | Refills: 0 | Status: SHIPPED | OUTPATIENT
Start: 2024-02-13 | End: 2024-02-23

## 2024-02-13 RX ORDER — TORSEMIDE 20 MG/1
20 TABLET ORAL DAILY
Qty: 30 TABLET | Refills: 0 | Status: SHIPPED | OUTPATIENT
Start: 2024-02-13 | End: 2024-02-13

## 2024-02-13 RX ORDER — TORSEMIDE 20 MG/1
20 TABLET ORAL DAILY
Qty: 90 TABLET | Refills: 0 | Status: SHIPPED | OUTPATIENT
Start: 2024-02-13 | End: 2024-06-17

## 2024-02-13 ASSESSMENT — PAIN SCALES - GENERAL: PAINLEVEL: WORST PAIN (10)

## 2024-02-13 NOTE — TELEPHONE ENCOUNTER
PA denied for Mounjaro.  Looks like Trulicity is covered instead - does she want a prescription sent in for this?

## 2024-02-13 NOTE — TELEPHONE ENCOUNTER
[facility] to relay provider's message below with no answer, left voicemail to call clinic back at [phone].    Sherly Laurent RN on 2/13/2024 at 12:29 PM   None

## 2024-02-13 NOTE — PROGRESS NOTES
Atul James is a 63 year old, presenting for the following health issues:  Leg Pain (Bilateral /2 weeks/No injuries/Started with upper legs, now is lower legs and heels.  /Edema) and Health Maintenance (Patient declined vaccines today)      2/13/2024     9:14 AM   Additional Questions   Roomed by Sara Miranda CMA   Accompanied by None         2/13/2024     9:14 AM   Patient Reported Additional Medications   Patient reports taking the following new medications none     HPI       Bilateral leg pain  2 weeks  No injuries  Started with knees - radiating to upper legs, now has spread to lower legs and heels  Edema  Pain level 10/10  Recheck of obesity. Discussed a lower calorie diet and consistent mix of aerobic exercise and strength training. This will help maintain/treat her blood pressure.  No sob.  Occasional low back pain.  Lower extremities are edematous.  History of hfpef.   No recent chest pain/palpitations.   No n/v/d/c.   No abd pain.   Started mounjaro a few wks ago.  No irritative/obstructive voiding symptoms.  No rashes.   Pain and swelling from her knees down.  Was effecting her thigh area as well, but now just the knees down. Some paresthesias.         Review of Systems  Constitutional, neuro, ENT, endocrine, pulmonary, cardiac, gastrointestinal, genitourinary, musculoskeletal, integument and psychiatric systems are negative, except as otherwise noted.      Objective    LMP  (LMP Unknown)   There is no height or weight on file to calculate BMI.  Physical Exam   Eye exam - right eye normal lid, conjunctiva, cornea, pupil and fundus, left eye normal lid, conjunctiva, cornea, pupil and fundus.  Thyroid not palpable, not enlarged, no nodules detected.  LLL crackles otherwise no other abnormalities  no tachypnea, retractions or cyanosis, and S1, S2 normal, no murmur, no gallop, rate regular.  Pitting edema of both legs and feet. Dec pulses (difficult to palpate due to edema).  Skin is warm and  dry. No rash. No profound redness, maybe some subtle redness. Right leg bothers her more than the left.   Jacob was seen today for leg pain and health maintenance.    Diagnoses and all orders for this visit:    Bilateral leg edema  -     XR Chest 2 Views; Future  -     Basic metabolic panel  (Ca, Cl, CO2, Creat, Gluc, K, Na, BUN); Future  -     BNP-N terminal pro; Future  -     US Lower Extremity Venous Duplex Bilateral; Future  -     Basic metabolic panel  (Ca, Cl, CO2, Creat, Gluc, K, Na, BUN)  -     BNP-N terminal pro  -     torsemide (DEMADEX) 20 MG tablet; Take 1 tablet (20 mg) by mouth daily    Morbid obesity (H)    Chronic congestive heart failure, unspecified heart failure type (H)  -     torsemide (DEMADEX) 20 MG tablet; Take 1 tablet (20 mg) by mouth daily    Pain in both lower legs  -     CBC with platelets and differential; Future  -     ESR: Erythrocyte sedimentation rate; Future  -     Uric acid; Future  -     CRP, inflammation; Future  -     US Lower Extremity Venous Duplex Bilateral; Future  -     US DEEPAK Doppler No Exercise; Future  -     CBC with platelets and differential  -     ESR: Erythrocyte sedimentation rate  -     Uric acid  -     CRP, inflammation  -     predniSONE (DELTASONE) 20 MG tablet; Take 1 tablet (20 mg) by mouth 2 times daily for 7 days    Decreased pulses in feet  -     US DEEPAK Doppler No Exercise; Future    Acute combined systolic (congestive) and diastolic (congestive) heart failure (H)  -     BNP-N terminal pro; Future  -     BNP-N terminal pro    Other elevated white blood cell (WBC) count  -     cephALEXin (KEFLEX) 500 MG capsule; Take 1 capsule (500 mg) by mouth 2 times daily for 10 days    Leg swelling  -     cephALEXin (KEFLEX) 500 MG capsule; Take 1 capsule (500 mg) by mouth 2 times daily for 10 days      Elevate legs.  Lower sodium diet.  F/up with pcp in a week. Sooner if condition worsens.    Signed Electronically by: Guillermo Kaiser PA-C

## 2024-02-13 NOTE — TELEPHONE ENCOUNTER
PRIOR AUTHORIZATION DENIED    Medication: tirzepatide (MOUNJARO) 7.5 MG/0.5ML pen-PA DENIED     Denial Date: 2/9/2024    Denial Rational:           Appeal Information:

## 2024-02-14 ENCOUNTER — NURSE TRIAGE (OUTPATIENT)
Dept: NURSING | Facility: CLINIC | Age: 64
End: 2024-02-14

## 2024-02-14 NOTE — TELEPHONE ENCOUNTER
Spoke with patient, relayed providers message below. Pt verbalized understanding and ok with trying Trulicity.     Routing to pcp to advise.     Sherly Laurent, RN on 2/14/2024 at 10:22 AM

## 2024-02-14 NOTE — TELEPHONE ENCOUNTER
Pcp sent script for trulicity. Appears waiting payer response.      Disp Refills Start End PRISCILA   dulaglutide (TRULICITY) 1.5 MG/0.5ML pen 6 mL 0 2/14/2024 -- No   Sig - Route: Inject 1.5 mg Subcutaneous every 7 days - switch from Mounjaro 2/14/24 - Subcutaneous   Class: E-Prescribe   Order: [id number]   Prior authorization: Payer Waiting for Response   This order has not been released to its destination.     Routing to PA team to advise as there is a deadline on payer response.     Sherly Laurent RN on 2/14/2024 at 11:01 AM

## 2024-02-15 ENCOUNTER — ANCILLARY PROCEDURE (OUTPATIENT)
Dept: ULTRASOUND IMAGING | Facility: CLINIC | Age: 64
End: 2024-02-15
Attending: PHYSICIAN ASSISTANT
Payer: MEDICARE

## 2024-02-15 ENCOUNTER — VIRTUAL VISIT (OUTPATIENT)
Dept: PHARMACY | Facility: CLINIC | Age: 64
End: 2024-02-15
Payer: MEDICARE

## 2024-02-15 DIAGNOSIS — M79.661 PAIN IN BOTH LOWER LEGS: ICD-10-CM

## 2024-02-15 DIAGNOSIS — R09.89 DECREASED PULSES IN FEET: ICD-10-CM

## 2024-02-15 DIAGNOSIS — E11.9 TYPE 2 DIABETES MELLITUS WITHOUT COMPLICATION, WITHOUT LONG-TERM CURRENT USE OF INSULIN (H): Primary | ICD-10-CM

## 2024-02-15 DIAGNOSIS — M79.662 PAIN IN BOTH LOWER LEGS: ICD-10-CM

## 2024-02-15 DIAGNOSIS — R60.9 EDEMA, UNSPECIFIED TYPE: ICD-10-CM

## 2024-02-15 PROCEDURE — 93922 UPR/L XTREMITY ART 2 LEVELS: CPT | Mod: GC | Performed by: STUDENT IN AN ORGANIZED HEALTH CARE EDUCATION/TRAINING PROGRAM

## 2024-02-15 PROCEDURE — 99207 PR NO CHARGE LOS: CPT | Mod: 93 | Performed by: PHARMACIST

## 2024-02-15 NOTE — TELEPHONE ENCOUNTER
Patient stated she had a voicemail to call a phone number, which she did, and was connected to [facility].  She stated she did not know how old the message was, maybe it was old. There appears to be no recent calls out to patient which are waiting for call back.     Reason for Disposition   General information question, no triage required and triager able to answer question    Protocols used: Information Only Call - No Triage-A-

## 2024-02-15 NOTE — Clinical Note
FYI - patient hasn't picked up Trulicity yet and was a little confused so we discussed the insurance issue today. She will go get Trulicity now and start using. Have follow-up next month. Thanks! Blue

## 2024-02-15 NOTE — PROGRESS NOTES
Medication Therapy Management (MTM) Encounter    ASSESSMENT:                            Medication Adherence/Access: No issues identified     Type 2 Diabetes: Patient is not meeting A1c goal of < 7% but A1C has improved immensely since starting Mounjaro. Unfortunately insurance is making her try and fail Bydureon or Trulicity now. Trulicity was ordered to replace Mounjaro but patient hasn't picked it up yet     Microalbumin: up to date. Last microalbumin was at goal <30 mg/g. Pt is on ACEI/ARB.  Aspirin: is appropriate in this patient for primary prevention  Statin: Patient is on moderate intensity statin which is indicated per 2019 ACC/AHA guidelines for lipid management due to the presence of diabetes.  Immunizations: Due for Prevnar 20 , Shingrix series, and annual influenza vaccine. Pt declines vaccines  Annual Eye Exam: due. Pt has it scheduled for March 8th     Edema: improving. Pt will follow-up with PCP if concerns    PLAN:                            1. Advised patient  and start Trulicity 1.5 mg weekly to replace Mounjaro based on insurance preference    Follow-up: Return in 5 weeks (on 3/21/2024) for Follow up, with me, using a phone visit.    SUBJECTIVE/OBJECTIVE:                          Jacob Cai is a 63 year old female called for a follow-up visit from 1/23/24. INBEP  was used during today's visit.       Reason for visit: follow-up on Mounjaro increase.    Allergies/ADRs: None  Past Medical History: Reviewed in chart  Tobacco: She reports that she has quit smoking. Her smoking use included cigarettes. She has never used smokeless tobacco.  Alcohol: none     Medication Adherence/Access: no issues reported  Diabetes   Type 2 Diabetes  Metformin ER 1000 mg twice daily  Glipizide XL 10 mg twice daily  Trulicity 1.5 mg weekly - had to switch from Mounjaro due to insurance  Levemir 27 units in the evening  Aspirin 81 mg daily  Statin: atorvastatin 20 mg daily    Pt was tolerating  Mounjaro well  Unfortunately this is not covered by insurance anymore  She has to try and fail Trulicity and Bydureon first  She hasn't picked up the Trulicity yet  [facility]  She didn't know Trulicity was filled or what it was for     Diet: minimizing carbohydrates, eating mostly vegetables. Tries to eat eggs and fish for protein. Doesn't really eat meat    Blood sugar monitoring: checks with glucometer twice daily, fasting and 1-2 hours after breakfast. She forgot to record her readings. CGM is covered but pt can't afford $50 copay.      Eye exam in the last 12 months? No - scheduled for March 8th    Edema:  Torsemide 20 mg daily  Just recently started experiencing edema  Is working with PCP to address  Feels symptoms are improving with torsemide    BP Readings from Last 3 Encounters:   02/13/24 120/64   01/09/24 138/69   01/08/24 136/88     Pulse Readings from Last 3 Encounters:   02/13/24 84   01/09/24 75   01/08/24 71     Current Outpatient Medications   Medication Sig    amLODIPine (NORVASC) 10 MG tablet Take 1 tablet (10 mg) by mouth daily    aspirin (ASA) 81 MG EC tablet Take 81 mg by mouth every 24 hours    atorvastatin (LIPITOR) 20 MG tablet Take 1 tablet (20 mg) by mouth daily - stop simvastatin 8/1/23    blood glucose (NO BRAND SPECIFIED) test strip Use to test blood sugars 2 times daily or as directed.    cephALEXin (KEFLEX) 500 MG capsule Take 1 capsule (500 mg) by mouth 2 times daily for 10 days    diclofenac (VOLTAREN) 1 % topical gel Apply 2 g topically 4 times daily as needed for moderate pain    dulaglutide (TRULICITY) 1.5 MG/0.5ML pen Inject 1.5 mg Subcutaneous every 7 days - switch from Mounjaro 2/14/24    glipiZIDE (GLUCOTROL XL) 10 MG 24 hr tablet Take 2 tablets (20 mg) by mouth daily    hydrochlorothiazide (HYDRODIURIL) 50 MG tablet Take 1 tablet (50 mg) by mouth daily    insulin glargine (LANTUS PEN) 100 UNIT/ML pen Inject 27 Units Subcutaneous at bedtime    insulin pen  needle (32G X 4 MM) 32G X 4 MM miscellaneous Use bid pen needles daily or as directed.    lisinopril (ZESTRIL) 40 MG tablet TAKE 1 TABLET (40 MG) BY MOUTH DAILY    metFORMIN (GLUCOPHAGE XR) 500 MG 24 hr tablet Take 2 tablets (1,000 mg) by mouth 2 times daily (with meals)    metoprolol succinate ER (TOPROL XL) 100 MG 24 hr tablet Take 1 tablet (100 mg) by mouth daily    predniSONE (DELTASONE) 20 MG tablet Take 1 tablet (20 mg) by mouth 2 times daily for 7 days    sertraline (ZOLOFT) 100 MG tablet TAKE 2 TABLETS(200 MG) BY MOUTH DAILY    torsemide (DEMADEX) 20 MG tablet Take 1 tablet (20 mg) by mouth daily     No current facility-administered medications for this visit.     Lab Results   Component Value Date    A1C 7.4 (H) 02/05/2024     (H) 02/13/2024     02/13/2024    POTASSIUM 4.2 02/13/2024    AILEEN 9.8 02/13/2024    CHLORIDE 100 02/13/2024    CO2 26 02/13/2024    BUN 20.8 02/13/2024    CR 0.72 02/13/2024    GFRESTIMATED >90 02/13/2024    CHOL 115 11/01/2023    LDL 60 11/01/2023    HDL 26 (L) 11/01/2023    TRIG 146 11/01/2023    B12 411 05/30/2012    UMALCR  12/19/2023      Comment:      Unable to calculate, urine albumin and/or urine creatinine is outside detectable limits.  Microalbuminuria is defined as an albumin:creatinine ratio of 17 to 299 for males and 25 to 299 for females. A ratio of albumin:creatinine of 300 or higher is indicative of overt proteinuria.  Due to biologic variability, positive results should be confirmed by a second, first-morning random or 24-hour timed urine specimen. If there is discrepancy, a third specimen is recommended. When 2 out of 3 results are in the microalbuminuria range, this is evidence for incipient nephropathy and warrants increased efforts at glucose control, blood pressure control, and institution of therapy with an angiotensin-converting-enzyme (ACE) inhibitor (if the patient can tolerate it).      TSH 1.18 04/20/2023       ----------------  I spent 17  minutes with this patient today. All changes were made via collaborative practice agreement with Clara Ambriz PA-C. A copy of the visit note was provided to the patient's provider(s).    A summary of these recommendations was declined by the patient.    Blue Leary, PharmD, [facility]  Medication Therapy Management Provider  [phone]    Telemedicine Visit Details  Type of service:  Telephone visit  Start Time: 1:05 PM  End Time:  1:23 PM     Medication Therapy Recommendations  Type 2 diabetes mellitus without complication, without long-term current use of insulin (H)    Current Medication: dulaglutide (TRULICITY) 1.5 MG/0.5ML pen   Rationale: Does not understand instructions - Adherence - Adherence   Recommendation: Provide Adherence Intervention - Trulicity 1.5 MG/0.5ML Sopn   Status: Accepted - no CPA Needed

## 2024-02-16 ENCOUNTER — TELEPHONE (OUTPATIENT)
Dept: FAMILY MEDICINE | Facility: CLINIC | Age: 64
End: 2024-02-16
Payer: MEDICARE

## 2024-02-16 DIAGNOSIS — E11.9 TYPE 2 DIABETES MELLITUS WITHOUT COMPLICATION, WITHOUT LONG-TERM CURRENT USE OF INSULIN (H): ICD-10-CM

## 2024-02-16 RX ORDER — INSULIN DETEMIR 100 [IU]/ML
27 INJECTION, SOLUTION SUBCUTANEOUS AT BEDTIME
Qty: 30 ML | Refills: 1 | Status: SHIPPED | OUTPATIENT
Start: 2024-02-16 | End: 2024-09-18

## 2024-02-16 NOTE — PATIENT INSTRUCTIONS
Gianni James, it was great talking with you today!     Recommendations from today's MTM visit:                                                      1. Start Trulicity 1.5 mg pens to replace Mounjaro since your insurance requires this    I value your experience and would be very grateful for your time with providing feedback on our clinic survey. You may receive a survey via email or text message in the next few days.     Next MTM visit: 3/21/24    To schedule another MTM appointment, please call the clinic directly or you may call the MTM scheduling line at [phone] or toll-free at [phone].     My Clinical Pharmacist's contact information:                                                      Please feel free to contact me with any questions or concerns you have.      Blue Leary, PharmD, BCACP  [facility]  Phone: [phone]

## 2024-02-16 NOTE — TELEPHONE ENCOUNTER
[facility]. RN relayed providers message. Patient verbalized understanding. Patient states she is doing better, her leg swelling and pain has improved.     Lisa Valverde RN on 2/16/2024 at 9:27 AM

## 2024-02-16 NOTE — TELEPHONE ENCOUNTER
----- Message from Guillermo Kaiser PA-C sent at 2/15/2024  6:48 PM CST -----  Let patient know that there were no concerning lab abnormalities noted . Also ,her dipika US revealed no profound circulation issues. Lastly her leg US revealed no evidence of blood clots. Find out how she's feeling.

## 2024-03-08 ENCOUNTER — OFFICE VISIT (OUTPATIENT)
Dept: OPHTHALMOLOGY | Facility: CLINIC | Age: 64
End: 2024-03-08
Payer: MEDICARE

## 2024-03-08 DIAGNOSIS — E11.9 TYPE 2 DIABETES MELLITUS WITHOUT RETINOPATHY (H): Primary | ICD-10-CM

## 2024-03-08 DIAGNOSIS — H25.13 SENILE NUCLEAR SCLEROSIS, BILATERAL: ICD-10-CM

## 2024-03-08 DIAGNOSIS — H40.013 OPEN ANGLE WITH BORDERLINE FINDINGS AND LOW GLAUCOMA RISK IN BOTH EYES: ICD-10-CM

## 2024-03-08 DIAGNOSIS — H52.4 PRESBYOPIA: ICD-10-CM

## 2024-03-08 PROCEDURE — 92015 DETERMINE REFRACTIVE STATE: CPT | Performed by: OPTOMETRIST

## 2024-03-08 PROCEDURE — 92133 CPTRZD OPH DX IMG PST SGM ON: CPT | Performed by: OPTOMETRIST

## 2024-03-08 PROCEDURE — 92004 COMPRE OPH EXAM NEW PT 1/>: CPT | Performed by: OPTOMETRIST

## 2024-03-08 ASSESSMENT — TONOMETRY
OS_IOP_MMHG: 23
OD_IOP_MMHG: 22
IOP_METHOD: ICARE

## 2024-03-08 ASSESSMENT — VISUAL ACUITY
OD_CC: 20/20
OD_CC+: -1
OD_SC: J1
OS_CC+: -1
METHOD: SNELLEN - LINEAR
OS_CC: 20/30
OS_SC: J1
CORRECTION_TYPE: GLASSES

## 2024-03-08 ASSESSMENT — REFRACTION_MANIFEST
OD_SPHERE: +0.50
OD_ADD: +2.75
OD_CYLINDER: +0.50
OS_SPHERE: +0.25
OS_ADD: +2.75
OS_CYLINDER: +0.50
OS_AXIS: 080
OD_AXIS: 172

## 2024-03-08 ASSESSMENT — CONF VISUAL FIELD
OD_NORMAL: 1
OD_SUPERIOR_TEMPORAL_RESTRICTION: 0
OD_SUPERIOR_NASAL_RESTRICTION: 0
OS_NORMAL: 1
OS_INFERIOR_TEMPORAL_RESTRICTION: 0
OS_SUPERIOR_NASAL_RESTRICTION: 0
OD_INFERIOR_TEMPORAL_RESTRICTION: 0
METHOD: COUNTING FINGERS
OD_INFERIOR_NASAL_RESTRICTION: 0
OS_INFERIOR_NASAL_RESTRICTION: 0
OS_SUPERIOR_TEMPORAL_RESTRICTION: 0

## 2024-03-08 ASSESSMENT — SLIT LAMP EXAM - LIDS
COMMENTS: NORMAL
COMMENTS: NORMAL

## 2024-03-08 ASSESSMENT — REFRACTION_WEARINGRX
OS_AXIS: 069
OS_CYLINDER: +0.50
SPECS_TYPE: BIFOCAL
OD_AXIS: 158
OS_ADD: +2.75
OD_SPHERE: +0.25
OD_ADD: +2.75
OD_CYLINDER: +0.50
OS_SPHERE: +0.25

## 2024-03-08 ASSESSMENT — EXTERNAL EXAM - RIGHT EYE: OD_EXAM: NORMAL

## 2024-03-08 ASSESSMENT — CUP TO DISC RATIO
OD_RATIO: 0.4
OS_RATIO: 0.4

## 2024-03-08 ASSESSMENT — EXTERNAL EXAM - LEFT EYE: OS_EXAM: NORMAL

## 2024-03-08 NOTE — PROGRESS NOTES
Assessment/Plan  (E11.9) Type 2 diabetes mellitus without retinopathy (H)  (primary encounter diagnosis)  Plan: Discussed findings with patient. Encouraged continued blood glucose, pressure, and lipid control. Patient should continue following recommendations of primary care provider. Patient should plan on returning to clinic annually for a dilated eye exam but was encouraged to return to clinic sooner with new flashes/floaters or other vision changes.     (H40.013) Open angle with borderline findings and low glaucoma risk in both eyes  Comment: based on mildly elevated IOP today of 22/23  -RNFL OCT  both eyes   Plan: OCT Optic Nerve RNFL Spectralis OU (both eyes)        Monitor in 1 year with repeat OCT if IOP remains elevated. No indication for sooner follow up or treatment today.     (H25.13) Senile nuclear sclerosis, bilateral  Comment: Mild.   Plan: Monitor only.     (H52.4) Presbyopia  Plan: Discussed findings with patient. New spectacle prescription dispensed to patient. Patient is welcome to return to clinic with prolonged adaptation difficulties. Minimal changes compared with habitual glasses.       Complete documentation of historical and exam elements from today's encounter can  be found in the full encounter summary report (not reduplicated in this progress  note). I personally obtained the chief complaint(s) and history of present illness. I  confirmed and edited as necessary the review of systems, past medical/surgical  history, family history, social history, and examination findings as documented by  others; and I examined the patient myself. I personally reviewed the relevant tests,  images, and reports as documented above. I formulated and edited as necessary the  assessment and plan and discussed the findings and management plan with the  patient and family.    Munir Loyd OD

## 2024-04-15 DIAGNOSIS — E78.5 HYPERLIPIDEMIA LDL GOAL <100: ICD-10-CM

## 2024-04-16 RX ORDER — ATORVASTATIN CALCIUM 20 MG/1
TABLET, FILM COATED ORAL
Qty: 90 TABLET | Refills: 1 | Status: SHIPPED | OUTPATIENT
Start: 2024-04-16 | End: 2024-09-18

## 2024-04-18 ENCOUNTER — TELEPHONE (OUTPATIENT)
Dept: PHARMACY | Facility: CLINIC | Age: 64
End: 2024-04-18
Payer: MEDICARE

## 2024-04-18 NOTE — TELEPHONE ENCOUNTER
Called patient using [facility]  to help her reschedule missed follow-up MTM appointment. Patient didn't answer. Message was left advising patient to call [phone] to reschedule.    Meng GasparD, [facility]  Medication Therapy Management Provider  [phone]

## 2024-04-25 ENCOUNTER — TELEPHONE (OUTPATIENT)
Dept: FAMILY MEDICINE | Facility: CLINIC | Age: 64
End: 2024-04-25
Payer: MEDICARE

## 2024-04-25 NOTE — TELEPHONE ENCOUNTER
Forms received from: Natasha   Phone number listed: [phone]   Fax listed: [phone]  Date received: 4/19/24  Form description:  Diabetes standard written order  Once forms are completed, please return to [employer] via fax.  Is patient requesting to be contacted when forms are completed: na  Phone: na  Form placed:  Clara Greer

## 2024-04-30 ENCOUNTER — MEDICAL CORRESPONDENCE (OUTPATIENT)
Dept: HEALTH INFORMATION MANAGEMENT | Facility: CLINIC | Age: 64
End: 2024-04-30
Payer: MEDICARE

## 2024-05-02 ENCOUNTER — TELEPHONE (OUTPATIENT)
Dept: FAMILY MEDICINE | Facility: CLINIC | Age: 64
End: 2024-05-02
Payer: MEDICARE

## 2024-05-02 ENCOUNTER — MEDICAL CORRESPONDENCE (OUTPATIENT)
Dept: HEALTH INFORMATION MANAGEMENT | Facility: CLINIC | Age: 64
End: 2024-05-02
Payer: MEDICARE

## 2024-05-02 NOTE — TELEPHONE ENCOUNTER
Forms received from: Natasha   Phone number listed: [phone]   Fax listed: [phone]  Date received: 4/28/24  Form description:  Diabetes standard written order  Once forms are completed, please return to [employer] via fax.  Is patient requesting to be contacted when forms are completed: na  Phone: na  Form placed:  Clara Greer

## 2024-05-03 ENCOUNTER — MEDICAL CORRESPONDENCE (OUTPATIENT)
Dept: HEALTH INFORMATION MANAGEMENT | Facility: CLINIC | Age: 64
End: 2024-05-03
Payer: MEDICARE

## 2024-05-10 DIAGNOSIS — F32.0 MILD MAJOR DEPRESSION (H): ICD-10-CM

## 2024-05-10 RX ORDER — SERTRALINE HYDROCHLORIDE 100 MG/1
200 TABLET, FILM COATED ORAL DAILY
Qty: 180 TABLET | Refills: 0 | Status: SHIPPED | OUTPATIENT
Start: 2024-05-10 | End: 2024-08-13

## 2024-06-03 DIAGNOSIS — E11.9 TYPE 2 DIABETES MELLITUS WITHOUT COMPLICATION, WITHOUT LONG-TERM CURRENT USE OF INSULIN (H): ICD-10-CM

## 2024-06-03 RX ORDER — INSULIN GLARGINE 100 [IU]/ML
INJECTION, SOLUTION SUBCUTANEOUS
Qty: 15 ML | Refills: 0 | Status: SHIPPED | OUTPATIENT
Start: 2024-06-03 | End: 2024-07-02

## 2024-06-17 ENCOUNTER — ANCILLARY PROCEDURE (OUTPATIENT)
Dept: GENERAL RADIOLOGY | Facility: CLINIC | Age: 64
End: 2024-06-17
Attending: PHYSICIAN ASSISTANT
Payer: MEDICARE

## 2024-06-17 ENCOUNTER — OFFICE VISIT (OUTPATIENT)
Dept: FAMILY MEDICINE | Facility: CLINIC | Age: 64
End: 2024-06-17
Payer: MEDICARE

## 2024-06-17 VITALS
SYSTOLIC BLOOD PRESSURE: 120 MMHG | TEMPERATURE: 97.8 F | DIASTOLIC BLOOD PRESSURE: 70 MMHG | WEIGHT: 235 LBS | RESPIRATION RATE: 22 BRPM | HEART RATE: 69 BPM | OXYGEN SATURATION: 97 % | HEIGHT: 64 IN | BODY MASS INDEX: 40.12 KG/M2

## 2024-06-17 DIAGNOSIS — E11.9 TYPE 2 DIABETES MELLITUS WITHOUT COMPLICATION, WITHOUT LONG-TERM CURRENT USE OF INSULIN (H): ICD-10-CM

## 2024-06-17 DIAGNOSIS — F33.1 MAJOR DEPRESSIVE DISORDER, RECURRENT EPISODE, MODERATE (H): ICD-10-CM

## 2024-06-17 DIAGNOSIS — I10 BENIGN ESSENTIAL HYPERTENSION WITH TARGET BLOOD PRESSURE BELOW 140/90: ICD-10-CM

## 2024-06-17 DIAGNOSIS — M54.50 CHRONIC BILATERAL LOW BACK PAIN WITHOUT SCIATICA: Primary | ICD-10-CM

## 2024-06-17 DIAGNOSIS — G89.29 CHRONIC BILATERAL LOW BACK PAIN WITHOUT SCIATICA: Primary | ICD-10-CM

## 2024-06-17 DIAGNOSIS — E66.01 MORBID OBESITY (H): ICD-10-CM

## 2024-06-17 DIAGNOSIS — I50.9 CHRONIC CONGESTIVE HEART FAILURE, UNSPECIFIED HEART FAILURE TYPE (H): ICD-10-CM

## 2024-06-17 DIAGNOSIS — M54.50 CHRONIC BILATERAL LOW BACK PAIN WITHOUT SCIATICA: ICD-10-CM

## 2024-06-17 DIAGNOSIS — R60.0 BILATERAL LEG EDEMA: ICD-10-CM

## 2024-06-17 DIAGNOSIS — G89.29 CHRONIC BILATERAL LOW BACK PAIN WITHOUT SCIATICA: ICD-10-CM

## 2024-06-17 LAB
HBA1C MFR BLD: 8.8 % (ref 0–5.6)
HOLD SPECIMEN: NORMAL

## 2024-06-17 PROCEDURE — 83036 HEMOGLOBIN GLYCOSYLATED A1C: CPT | Performed by: PHYSICIAN ASSISTANT

## 2024-06-17 PROCEDURE — 99214 OFFICE O/P EST MOD 30 MIN: CPT | Performed by: PHYSICIAN ASSISTANT

## 2024-06-17 PROCEDURE — 36415 COLL VENOUS BLD VENIPUNCTURE: CPT | Performed by: PHYSICIAN ASSISTANT

## 2024-06-17 PROCEDURE — 72100 X-RAY EXAM L-S SPINE 2/3 VWS: CPT | Mod: TC | Performed by: RADIOLOGY

## 2024-06-17 RX ORDER — TORSEMIDE 20 MG/1
20 TABLET ORAL DAILY
Qty: 90 TABLET | Refills: 3 | Status: SHIPPED | OUTPATIENT
Start: 2024-06-17

## 2024-06-17 RX ORDER — GLIPIZIDE 10 MG/1
20 TABLET, FILM COATED, EXTENDED RELEASE ORAL DAILY
Qty: 180 TABLET | Refills: 0 | Status: SHIPPED | OUTPATIENT
Start: 2024-06-17 | End: 2024-09-18

## 2024-06-17 RX ORDER — AMLODIPINE BESYLATE 10 MG/1
10 TABLET ORAL DAILY
Qty: 90 TABLET | Refills: 3 | Status: SHIPPED | OUTPATIENT
Start: 2024-06-17

## 2024-06-17 RX ORDER — METFORMIN HCL 500 MG
1000 TABLET, EXTENDED RELEASE 24 HR ORAL 2 TIMES DAILY WITH MEALS
Qty: 360 TABLET | Refills: 1 | Status: SHIPPED | OUTPATIENT
Start: 2024-06-17

## 2024-06-17 RX ORDER — LISINOPRIL 40 MG/1
40 TABLET ORAL DAILY
Qty: 90 TABLET | Refills: 3 | Status: SHIPPED | OUTPATIENT
Start: 2024-06-17

## 2024-06-17 RX ORDER — RESPIRATORY SYNCYTIAL VIRUS VACCINE 120MCG/0.5
0.5 KIT INTRAMUSCULAR ONCE
Qty: 1 EACH | Refills: 0 | Status: CANCELLED | OUTPATIENT
Start: 2024-06-17 | End: 2024-06-17

## 2024-06-17 RX ORDER — MELOXICAM 7.5 MG/1
7.5 TABLET ORAL DAILY
Qty: 30 TABLET | Refills: 1 | Status: SHIPPED | OUTPATIENT
Start: 2024-06-17 | End: 2024-08-13

## 2024-06-17 ASSESSMENT — PAIN SCALES - GENERAL: PAINLEVEL: EXTREME PAIN (9)

## 2024-06-17 ASSESSMENT — PATIENT HEALTH QUESTIONNAIRE - PHQ9
10. IF YOU CHECKED OFF ANY PROBLEMS, HOW DIFFICULT HAVE THESE PROBLEMS MADE IT FOR YOU TO DO YOUR WORK, TAKE CARE OF THINGS AT HOME, OR GET ALONG WITH OTHER PEOPLE: NOT DIFFICULT AT ALL
SUM OF ALL RESPONSES TO PHQ QUESTIONS 1-9: 3
SUM OF ALL RESPONSES TO PHQ QUESTIONS 1-9: 3

## 2024-06-17 NOTE — RESULT ENCOUNTER NOTE
Please send the following letter to the patient:    Jacob,    Overall, your low back x-ray looks ok. No significant signs of arthritis.     Please call me with any questions or concerns.          Clara Ambriz PA-C

## 2024-06-17 NOTE — PATIENT INSTRUCTIONS
Start meloxicam once daily. Do not take ibuprofen and naproxen with this. You can take it with Tylenol if needed.

## 2024-06-17 NOTE — PROGRESS NOTES
"  Assessment & Plan       ICD-10-CM    1. Chronic bilateral low back pain without sciatica  M54.50 Spine  Referral    G89.29 meloxicam (MOBIC) 7.5 MG tablet     XR Lumbar Spine 2/3 Views      2. Type 2 diabetes mellitus without complication, without long-term current use of insulin (H)  E11.9 Hemoglobin A1c     metFORMIN (GLUCOPHAGE XR) 500 MG 24 hr tablet     glipiZIDE (GLUCOTROL XL) 10 MG 24 hr tablet     dulaglutide (TRULICITY) 1.5 MG/0.5ML pen     Hemoglobin A1c      3. Benign essential hypertension with target blood pressure below 140/90  I10 lisinopril (ZESTRIL) 40 MG tablet     amLODIPine (NORVASC) 10 MG tablet      4. Chronic congestive heart failure, unspecified heart failure type (H)  I50.9 torsemide (DEMADEX) 20 MG tablet      5. Bilateral leg edema  R60.0 torsemide (DEMADEX) 20 MG tablet      6. Major depressive disorder, recurrent episode, moderate (H)  F33.1       7. Morbid obesity (H)  E66.01             1) Will update her lumbar x-ray today. Referral to Spine    2,3) Blood pressure at goal on recheck. A1c has increased. She has not been taking Trulicity, so she will start this now. Follow up in 3 months for a diabetes check and repeat A1c    4,5) Med renewed, no change    6) Follows psych    7) comorbid to HTN, CM      BMI  Estimated body mass index is 40.34 kg/m  as calculated from the following:    Height as of this encounter: 1.626 m (5' 4\").    Weight as of this encounter: 106.6 kg (235 lb).     Return in about 3 months (around 9/17/2024) for diabetes follow up, a repeat A1c, with Clara, in person.      Atul James is a 63 year old, presenting for the following health issues:  Diabetes        6/17/2024     9:55 AM   Additional Questions   Roomed by Donna Zapata CMA   Accompanied by N/A         6/17/2024     9:55 AM   Patient Reported Additional Medications   Patient reports taking the following new medications No new medications     History of Present Illness       Diabetes:   " "She presents for follow up of diabetes.  She is checking home blood glucose a few times a week.   She checks blood glucose before and after meals.  Blood glucose is never over 200 and never under 70.  When her blood glucose is low, the patient is asymptomatic for confusion, blurred vision, lethargy and reports not feeling dizzy, shaky, or weak.  She is concerned about other.    She is not experiencing numbness or burning in feet, excessive thirst, blurry vision, weight changes or redness, sores or blisters on feet.           She eats 2-3 servings of fruits and vegetables daily.She consumes 2 sweetened beverage(s) daily.She exercises with enough effort to increase her heart rate 9 or less minutes per day.  She exercises with enough effort to increase her heart rate 3 or less days per week.   She is taking medications regularly.       Low back pain has been a problem  Was in the UC, given tizanidine and lidocaine patches - no improvement  Hips as well  Doesn't have any shooting pains into the legs  Heating pad and ice helps a little  Has tried ibu, naproxen, and Tylenol      Review of Systems  Constitutional, neuro, endocrine, musculoskeletal systems are negative, except as otherwise noted.      Objective    /70   Pulse 69   Temp 97.8  F (36.6  C) (Temporal)   Resp 22   Ht 1.626 m (5' 4\")   Wt 106.6 kg (235 lb)   LMP  (LMP Unknown)   SpO2 97%   BMI 40.34 kg/m    Body mass index is 40.34 kg/m .  Physical Exam   GENERAL: alert and no distress  MS: no gross musculoskeletal defects noted, no edema  SKIN: no suspicious lesions or rashes  NEURO: Normal strength and tone, mentation intact and speech normal  PSYCH: mentation appears normal, affect normal/bright    Results for orders placed or performed in visit on 06/17/24 (from the past 24 hour(s))   Hemoglobin A1c   Result Value Ref Range    Hemoglobin A1C 8.8 (H) 0.0 - 5.6 %   Extra Tube    Narrative    The following orders were created for panel order Extra " Tube.  Procedure                               Abnormality         Status                     ---------                               -----------         ------                     Extra Serum Separator Tu...[[id number]]                      Final result                 Please view results for these tests on the individual orders.   Extra Serum Separator Tube (SST)   Result Value Ref Range    Hold Specimen JIC            Signed Electronically by: Clara mAbriz PA-C

## 2024-06-17 NOTE — LETTER
June 18, 2024      Jacob Cai  [address]  [place] [address]        Dear ,    We are writing to inform you of your test results.    Overall, your low back x-ray looks ok. No significant signs of arthritis.     Please call me with any questions or concerns.     Resulted Orders   XR Lumbar Spine 2/3 Views    Narrative    XR LUMBAR SPINE 2/3 VIEWS  6/17/2024 10:51 AM     HISTORY: Chronic low back pain; Chronic bilateral low back pain  without sciatica; Chronic bilateral low back pain without sciatica    COMPARISON: None.       Impression    IMPRESSION: Right convex scoliosis centered in the midlumbar spine. No  loss of vertebral body height. No significant degenerative endplate  changes or loss of intervertebral disc space.     ILANA SHEFFIELD MD         SYSTEM ID:  [id number]       If you have any questions or concerns, please call the clinic at the number listed above.       Sincerely,      Clara Ambriz PA-C

## 2024-06-28 ENCOUNTER — ANCILLARY ORDERS (OUTPATIENT)
Dept: FAMILY MEDICINE | Facility: CLINIC | Age: 64
End: 2024-06-28

## 2024-06-28 DIAGNOSIS — Z12.31 VISIT FOR SCREENING MAMMOGRAM: Primary | ICD-10-CM

## 2024-07-02 DIAGNOSIS — E11.9 TYPE 2 DIABETES MELLITUS WITHOUT COMPLICATION, WITHOUT LONG-TERM CURRENT USE OF INSULIN (H): ICD-10-CM

## 2024-07-02 RX ORDER — INSULIN GLARGINE 100 [IU]/ML
27 INJECTION, SOLUTION SUBCUTANEOUS AT BEDTIME
Qty: 15 ML | Refills: 0 | Status: SHIPPED | OUTPATIENT
Start: 2024-07-02 | End: 2024-09-12

## 2024-07-02 NOTE — PROGRESS NOTES
"    SUBJECTIVE:  HPI:  Jacob Cai  Is a 63 year old female who presents for new patient evaluation of low back pain referred from ED, and 6/13/2024 ED note records:  \"Jacob Cai is a 63 y.o. female who presents to the emergency department for evaluation of back pain. The patient states that she began experiencing pain in her low back and bilateral hips 2 days ago. She also endorses pain radiating into her legs, worse on the left and has had difficulty walking today due to pain. Her daughter notes that she has had increased physical activity over the last few days including walking a lot at her granddaughters graduation and while sweeping her porch yesterday. She denies any recent heavy lifting or falls. The patient also denies any hematuria, urinary or bowel incontinence, saddle anesthesia, or fevers. She reports treating with Tylenol and ibuprofen but has not had any medications yet today. No further complaints or concerns are voiced at this time...  ...Patient is not normally very physically active and has participated in activities that have increased her mobility in the past several days, culminating in progressive pain of her back bilaterally. Her pain is not radicular in nature. She did not sustain any trauma and therefore xrays are not indicated due to the low likelihood of fracture or subluxation. There are no red flag symptoms warranting need for CT/MRI. There is no clinical evidence of cauda equina syndrome, discitis, spinal/epidural space hematoma, abscess, or other worrisome etiology. There have been no urinary symptoms concerning for pyelonephritis. Her neurologic exam is normal and her symptoms are consistent with a musculoskeletal source with significant muscle spasm. She will be discharged with the prescriptions as noted below. I discussed symptomatic care including Ibuprofen and the use of ice or heat to the back. She will not perform any heavy lifting or significant bending/twisting " "motions.\"    She saw JOSE Price on 6/17/2024 who added meloxicam and ordered a spine  referral.    An  was present (by phone/virtually) throughout the visit today. After discussing my assessment and treatment recommendations including risks and benefits with the patient/patient s , I asked the patient to convey what they understood about my assessment and recommendations to ensure understanding. The communication back from the patient/ family /parent, as relayed through the , confirmed understanding. The patient was also given time to have all questions answered.      There are no radicular symptoms or red flags for cauda equina or other systemic disease.      Pain score and diagram reviewed.  See questionnaire.      ROS: .  Otherwise negative for bowel/bladder dysfunction, balance changes, headache, leg pain/numbness/weakness, fevers, chills, night sweats, unexplained weight loss;  otherwise unremarkable.   See the patient's intake questionnaire from today for details.        MEDICATIONS:  Reviewed.    ALLERGIES:  Reviewed.     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  She posesses whose virtually no English proficiency.      OBJECTIVE:    IMAGING: Images and report reviewed     XR PELVIS 1/2 VIEWS: 7/9/2024                                                     IMPRESSION: Both hips negative for fracture or dislocation. Pelvis negative for fracture. Degenerative change at the SI joints bilaterally.    XR LUMBAR SPINE 2/3 VIEWS: 07/09/2024                                                IMPRESSION: Four nonrib-bearing lumbar vertebral bodies with partial sacralization of L5. Mild convex right curvature across the thoracolumbar junction. No subluxation. Normal vertebral body heights. Mild interspace and endplate " degeneration at L4-L5.  Mild facet arthropathy.    XR THORACIC SPINE 2 VIEWS: 7/9/2024                                                              IMPRESSION: 12 rib-bearing thoracic vertebral bodies demonstrate normal height and alignment with minor degenerative changes.     EXAMINATION:    --CONSTITUTIONAL:   No acute distress.  The patient is well nourished and well groomed.  Even with the assistance of the  it is difficult to communicate with her and get her to understand my instructions making the exam is difficult to interpret.  She is obese moves very slowly walks with a cane on her left hand side and has a pain infused exam when I touch any part of her thoracic and lumbar spine both SI joints the midline of the sacrum and even when I touch her upper arms and forearms.  --SKIN:  Skin over the face, bilateral lower extremities, and posterior torso is clean, dry, intact without rashes.    --GAIT:  is non-antalgic. Flat foot, heel and toe walking:  normal   .  Squat and rise   normal    she cries out in pain with all of these maneuvers..  --STANDING EXAMINATION:    Symmetry of spine/pelvis   unremarkable   .      Range of motion full and painful in flexion.  I cannot get her to understand how to go into lumbar extension.   Standing flexion   negative   .    Oneal's sign positive bilateral and unreliable.     Stork test   negative    .   --NEUROLOGICAL:    SENSATION to light touch is intact in torso, bilateral thighs, lower legs and feet.   REFLEXES:  patellar 1+, and achilles trace.  She cries out in pain with reflex testing Babinski is negative. No clonus.  MANUAL MOTOR TESTING:  L1- S1 Myotomes, Femoral, Obturator, Peroneal and Tibial nerves 5/5   DURAL STRETCH TESTS:  SLR negative.  Femoral Stretch Test not done.           ASSESSMENT: Jacob Cai is a 63 year old female who presents  today for new patient evaluation of:    Acute idiopathic thoracic, lumbar and pelvic pain      PLAN  MRI  thoracic, lumbar and pelvic MRI and RTC after.      75 minutes of time spent doing chart review, history and exam, documentation, counseling, education, coordination of care, and other activities as described above.        Advised patient to call or return early if symptoms worsen, or having problems controlling bladder and bowel function or worsening leg weakness.     Please note: Voice recognition software was used in this dictation.  It may therefore contain typographical errors.    Calos Pruett MD

## 2024-07-02 NOTE — TELEPHONE ENCOUNTER
Patients daughter calling in to request refill for lantus. No consent to communicate on file, pateints daughter states this was completed at one of the previous visits. No PHI discussed.    Patient is completely out of Lantus and needing this medication as soon as possible.       Lisa Valverde RN on 7/2/2024 at 12:32 PM

## 2024-07-09 ENCOUNTER — ANCILLARY PROCEDURE (OUTPATIENT)
Dept: GENERAL RADIOLOGY | Facility: CLINIC | Age: 64
End: 2024-07-09
Attending: PREVENTIVE MEDICINE
Payer: MEDICARE

## 2024-07-09 ENCOUNTER — OFFICE VISIT (OUTPATIENT)
Dept: NEUROSURGERY | Facility: CLINIC | Age: 64
End: 2024-07-09
Attending: PHYSICIAN ASSISTANT
Payer: MEDICARE

## 2024-07-09 VITALS
HEART RATE: 84 BPM | HEIGHT: 64 IN | DIASTOLIC BLOOD PRESSURE: 63 MMHG | BODY MASS INDEX: 37.88 KG/M2 | SYSTOLIC BLOOD PRESSURE: 112 MMHG | WEIGHT: 221.9 LBS

## 2024-07-09 DIAGNOSIS — R10.2 PELVIC PAIN: Primary | ICD-10-CM

## 2024-07-09 DIAGNOSIS — M54.50 CHRONIC BILATERAL LOW BACK PAIN WITHOUT SCIATICA: ICD-10-CM

## 2024-07-09 DIAGNOSIS — M54.6 CHRONIC MIDLINE THORACIC BACK PAIN: ICD-10-CM

## 2024-07-09 DIAGNOSIS — G89.29 CHRONIC MIDLINE THORACIC BACK PAIN: ICD-10-CM

## 2024-07-09 DIAGNOSIS — G89.29 CHRONIC BILATERAL LOW BACK PAIN WITHOUT SCIATICA: ICD-10-CM

## 2024-07-09 PROCEDURE — 72100 X-RAY EXAM L-S SPINE 2/3 VWS: CPT | Performed by: RADIOLOGY

## 2024-07-09 PROCEDURE — 99417 PROLNG OP E/M EACH 15 MIN: CPT | Performed by: PREVENTIVE MEDICINE

## 2024-07-09 PROCEDURE — 99205 OFFICE O/P NEW HI 60 MIN: CPT | Performed by: PREVENTIVE MEDICINE

## 2024-07-09 PROCEDURE — 72170 X-RAY EXAM OF PELVIS: CPT | Performed by: RADIOLOGY

## 2024-07-09 PROCEDURE — 72070 X-RAY EXAM THORAC SPINE 2VWS: CPT | Performed by: RADIOLOGY

## 2024-07-09 ASSESSMENT — PAIN SCALES - GENERAL: PAINLEVEL: SEVERE PAIN (6)

## 2024-07-09 NOTE — LETTER
"7/9/2024      Jacob Cai  [address]  [place] [address]      Dear Colleague,    Thank you for referring your patient, Jacob Cai, to the [facility] [place]. Please see a copy of my visit note below.        SUBJECTIVE:  HPI:  Jacob Cai  Is a 63 year old female who presents for new patient evaluation of low back pain referred from ED, and 6/13/2024 ED note records:  \"Jacob Cai is a 63 y.o. female who presents to the emergency department for evaluation of back pain. The patient states that she began experiencing pain in her low back and bilateral hips 2 days ago. She also endorses pain radiating into her legs, worse on the left and has had difficulty walking today due to pain. Her daughter notes that she has had increased physical activity over the last few days including walking a lot at her granddaughters graduation and while sweeping her porch yesterday. She denies any recent heavy lifting or falls. The patient also denies any hematuria, urinary or bowel incontinence, saddle anesthesia, or fevers. She reports treating with Tylenol and ibuprofen but has not had any medications yet today. No further complaints or concerns are voiced at this time...  ...Patient is not normally very physically active and has participated in activities that have increased her mobility in the past several days, culminating in progressive pain of her back bilaterally. Her pain is not radicular in nature. She did not sustain any trauma and therefore xrays are not indicated due to the low likelihood of fracture or subluxation. There are no red flag symptoms warranting need for CT/MRI. There is no clinical evidence of cauda equina syndrome, discitis, spinal/epidural space hematoma, abscess, or other worrisome etiology. There have been no urinary symptoms concerning for pyelonephritis. Her neurologic exam is normal and her symptoms are consistent with a musculoskeletal " "source with significant muscle spasm. She will be discharged with the prescriptions as noted below. I discussed symptomatic care including Ibuprofen and the use of ice or heat to the back. She will not perform any heavy lifting or significant bending/twisting motions.\"    She saw JOSE Price on 6/17/2024 who added meloxicam and ordered a spine  referral.    An  was present (by phone/virtually) throughout the visit today. After discussing my assessment and treatment recommendations including risks and benefits with the patient/patient s , I asked the patient to convey what they understood about my assessment and recommendations to ensure understanding. The communication back from the patient/ family /parent, as relayed through the , confirmed understanding. The patient was also given time to have all questions answered.      There are no radicular symptoms or red flags for cauda equina or other systemic disease.      Pain score and diagram reviewed.  See questionnaire.      ROS: .  Otherwise negative for bowel/bladder dysfunction, balance changes, headache, leg pain/numbness/weakness, fevers, chills, night sweats, unexplained weight loss;  otherwise unremarkable.   See the patient's intake questionnaire from today for details.        MEDICATIONS:  Reviewed.    ALLERGIES:  Reviewed.     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  She posesses whose virtually no English proficiency.      OBJECTIVE:    IMAGING: Images and report reviewed     XR PELVIS 1/2 VIEWS: 7/9/2024                                                     IMPRESSION: Both hips negative for fracture or dislocation. Pelvis negative for fracture. Degenerative change at the SI joints bilaterally.    XR LUMBAR SPINE 2/3 VIEWS: 07/09/2024                          "                       IMPRESSION: Four nonrib-bearing lumbar vertebral bodies with partial sacralization of L5. Mild convex right curvature across the thoracolumbar junction. No subluxation. Normal vertebral body heights. Mild interspace and endplate degeneration at L4-L5.  Mild facet arthropathy.    XR THORACIC SPINE 2 VIEWS: 7/9/2024                                                              IMPRESSION: 12 rib-bearing thoracic vertebral bodies demonstrate normal height and alignment with minor degenerative changes.     EXAMINATION:    --CONSTITUTIONAL:   No acute distress.  The patient is well nourished and well groomed.  Even with the assistance of the  it is difficult to communicate with her and get her to understand my instructions making the exam is difficult to interpret.  She is obese moves very slowly walks with a cane on her left hand side and has a pain infused exam when I touch any part of her thoracic and lumbar spine both SI joints the midline of the sacrum and even when I touch her upper arms and forearms.  --SKIN:  Skin over the face, bilateral lower extremities, and posterior torso is clean, dry, intact without rashes.    --GAIT:  is non-antalgic. Flat foot, heel and toe walking:  normal   .  Squat and rise   normal    she cries out in pain with all of these maneuvers..  --STANDING EXAMINATION:    Symmetry of spine/pelvis   unremarkable   .      Range of motion full and painful in flexion.  I cannot get her to understand how to go into lumbar extension.   Standing flexion   negative   .    Oneal's sign positive bilateral and unreliable.     Stork test   negative    .   --NEUROLOGICAL:    SENSATION to light touch is intact in torso, bilateral thighs, lower legs and feet.   REFLEXES:  patellar 1+, and achilles trace.  She cries out in pain with reflex testing Babinski is negative. No clonus.  MANUAL MOTOR TESTING:  L1- S1 Myotomes, Femoral, Obturator, Peroneal and Tibial nerves 5/5    DURAL STRETCH TESTS:  SLR negative.  Femoral Stretch Test not done.           ASSESSMENT: Jacob Cai is a 63 year old female who presents  today for new patient evaluation of:    Acute idiopathic thoracic, lumbar and pelvic pain      PLAN  MRI thoracic, lumbar and pelvic MRI and RTC after.      75 minutes of time spent doing chart review, history and exam, documentation, counseling, education, coordination of care, and other activities as described above.        Advised patient to call or return early if symptoms worsen, or having problems controlling bladder and bowel function or worsening leg weakness.     Please note: Voice recognition software was used in this dictation.  It may therefore contain typographical errors.    Calos Pruett MD             Again, thank you for allowing me to participate in the care of your patient.        Sincerely,        Calos Pruett MD

## 2024-07-09 NOTE — NURSING NOTE
"Reason For Visit:   Chief Complaint   Patient presents with    Consult     Low back pain          Occupation:    Currently working? No.  Work status?  Retired.    Sports: n  Activities: n             /63   Pulse 84   Ht 1.626 m (5' 4\")   Wt 100.7 kg (221 lb 14.4 oz)   LMP  (LMP Unknown)   BMI 38.09 kg/m        No Known Allergies    Current Outpatient Medications   Medication Sig Dispense Refill    amLODIPine (NORVASC) 10 MG tablet Take 1 tablet (10 mg) by mouth daily 90 tablet 3    aspirin (ASA) 81 MG EC tablet Take 81 mg by mouth every 24 hours      atorvastatin (LIPITOR) 20 MG tablet TAKE 1 TABLET(20 MG) BY MOUTH DAILY. STOP SIMVASTATIN 8/1/23 90 tablet 1    blood glucose (NO BRAND SPECIFIED) test strip Use to test blood sugars 2 times daily or as directed. 300 strip 4    diclofenac (VOLTAREN) 1 % topical gel Apply 2 g topically 4 times daily as needed for moderate pain 50 g 0    dulaglutide (TRULICITY) 1.5 MG/0.5ML pen Inject 1.5 mg Subcutaneous every 7 days 6 mL 0    glipiZIDE (GLUCOTROL XL) 10 MG 24 hr tablet Take 2 tablets (20 mg) by mouth daily 180 tablet 0    hydrochlorothiazide (HYDRODIURIL) 50 MG tablet Take 1 tablet (50 mg) by mouth daily 90 tablet 3    insulin detemir (LEVEMIR FLEXPEN/FLEXTOUCH) 100 UNIT/ML pen Inject 27 Units Subcutaneous at bedtime 30 mL 1    insulin glargine (LANTUS SOLOSTAR) 100 UNIT/ML pen Inject 27 Units Subcutaneous at bedtime 15 mL 0    insulin pen needle (32G X 4 MM) 32G X 4 MM miscellaneous Use bid pen needles daily or as directed. 180 each 1    lisinopril (ZESTRIL) 40 MG tablet Take 1 tablet (40 mg) by mouth daily 90 tablet 3    meloxicam (MOBIC) 7.5 MG tablet Take 1 tablet (7.5 mg) by mouth daily 30 tablet 1    metFORMIN (GLUCOPHAGE XR) 500 MG 24 hr tablet Take 2 tablets (1,000 mg) by mouth 2 times daily (with meals) 360 tablet 1    metoprolol succinate ER (TOPROL XL) 100 MG 24 hr tablet Take 1 tablet (100 mg) by mouth daily 90 tablet 3    sertraline " (ZOLOFT) 100 MG tablet TAKE 2 TABLETS(200 MG) BY MOUTH DAILY 180 tablet 0    torsemide (DEMADEX) 20 MG tablet Take 1 tablet (20 mg) by mouth daily 90 tablet 3     No current facility-administered medications for this visit.         Darla Severin-Brown, LESLYN

## 2024-07-31 NOTE — PROGRESS NOTES
"  Subjective:    Jacob Cai is a 63 year old female who presents today for follow-up regarding   Acute idiopathic thoracic, lumbar and pelvic pain     MRI thoracic, lumbar and pelvic MRI and RTC after.     PRIOR HISTORY from 7/9/2024:  She was seen for evaluation of low back pain referred from ED, and 6/13/2024 ED note records:  \"Jacob Cai is a 63 y.o. female who presents to the emergency department for evaluation of back pain. The patient states that she began experiencing pain in her low back and bilateral hips 2 days ago. She also endorses pain radiating into her legs, worse on the left and has had difficulty walking today due to pain. Her daughter notes that she has had increased physical activity over the last few days including walking a lot at her granddaughters graduation and while sweeping her porch yesterday. She denies any recent heavy lifting or falls. The patient also denies any hematuria, urinary or bowel incontinence, saddle anesthesia, or fevers. She reports treating with Tylenol and ibuprofen but has not had any medications yet today. No further complaints or concerns are voiced at this time...  ...Patient is not normally very physically active and has participated in activities that have increased her mobility in the past several days, culminating in progressive pain of her back bilaterally. Her pain is not radicular in nature. She did not sustain any trauma and therefore xrays are not indicated due to the low likelihood of fracture or subluxation. There are no red flag symptoms warranting need for CT/MRI. There is no clinical evidence of cauda equina syndrome, discitis, spinal/epidural space hematoma, abscess, or other worrisome etiology. There have been no urinary symptoms concerning for pyelonephritis. Her neurologic exam is normal and her symptoms are consistent with a musculoskeletal source with significant muscle spasm. She will be discharged with the prescriptions as noted " "below. I discussed symptomatic care including Ibuprofen and the use of ice or heat to the back. She will not perform any heavy lifting or significant bending/twisting motions.\"     She saw JOSE Price on 6/17/2024 who added meloxicam and ordered a spine  referral.     An  was present (by phone/virtually) throughout the visit today. After discussing my assessment and treatment recommendations including risks and benefits with the patient/patient s , I asked the patient to convey what they understood about my assessment and recommendations to ensure understanding. The communication back from the patient/ family /parent, as relayed through the , confirmed understanding. The patient was also given time to have all questions answered.        There are no radicular symptoms or red flags for cauda equina or other systemic disease.    INTERIM HISTORY:    An  was present /virtually) throughout the visit today. After discussing my assessment and treatment recommendations including risks and benefits with the patient, I asked the patient  to convey what they understood about my assessment and recommendations to ensure understanding. The  communication back from the patient, as relayed through the , confirmed understanding. The patient  was also given time to have all questions answered.    She has had longstanding tingling in the fingers and toes of both sides unrelated to this injury and unchanged.  She has had some numbness in the right anterior thigh which has gone away but she does have intermittent pain in that area still.  There is no leg weakness but there is a burning sensation in the right thigh.    Her pain is managed pretty well right now with meloxicam and she is feeling much better than she used to.      PAST MEDICAL/SURGICAL HISTORY:   Pertinent for depression, anxiety, CHF, hypertension, total thyroidectomy, DM2, morbid obesity, GERD     SOCIAL HX: She is " from [facility] and is a retired meat and bread cutter.  She posesses virtually no English proficiency.    Objective:    IMAGING:  Images and report reviewed    MR pelvis without contrast 8/2/2024     Castellvi type IIa lumbar transitional anatomy with pseudoarticulation  on the left side.     Sacroiliac joints and pubic symphysis are congruent. Focal subchondral  edema at the right sacroiliac joint sacral aspect (image 23 series 4).  Mild degenerative changes pubic symphysis.    Nonspecific soft tissue edema/small fluid surrounding the proximal  right femoral vein/external iliac vein with preservation of normal T2  flow void. Status post hysterectomy. Scattered colonic diverticula.                                                                      Impression:  1. Bilateral proximal hamstring and gluteus minimus tendinosis. No  high grade tear.  2. Focal subchondral edema at the right sacroiliac joint sacral  aspect, maybe degenerative.  3. Nonspecific soft tissue edema/small fluid surrounding the proximal  right femoral vein/external iliac vein with preservation of normal T2  flow void. Consider correlation with recent intervention in this area.    MRI LUMBAR SPINE WITHOUT CONTRAST   8/2/2024      L3-4: There is a broad-based disc bulge. Right central disc protrusion  with right lateral recess narrowing and mass effect on the descending  right L4 nerve root. Mild left and moderate right foraminal narrowing.     L4-5: Broad-based disc bulge. Ligamentum flavum hypertrophy and facet  arthrosis with mild canal stenosis and mild bilateral foraminal  narrowing.     IMPRESSION:  Degenerative changes in the lumbar spine as detailed  above. This is most significant at L3-4 where a right central  protrusion contributes to right lateral recess narrowing with mass  effect upon the descending right L4 nerve root.     MR THORACIC SPINE W/O CONTRAST 8/2/2024       Impression:  1. No significant spinal canal or neural foraminal  stenosis.  2. No abnormal spinal cord signal.           XR PELVIS 1/2 VIEWS: 7/9/2024                                                     IMPRESSION: Both hips negative for fracture or dislocation. Pelvis negative for fracture. Degenerative change at the SI joints bilaterally.     XR LUMBAR SPINE 2/3 VIEWS: 07/09/2024                                                IMPRESSION: Four nonrib-bearing lumbar vertebral bodies with partial sacralization of L5. Mild convex right curvature across the thoracolumbar junction. No subluxation. Normal vertebral body heights. Mild interspace and endplate degeneration at L4-L5.  Mild facet arthropathy.     XR THORACIC SPINE 2 VIEWS: 7/9/2024                                                              IMPRESSION: 12 rib-bearing thoracic vertebral bodies demonstrate normal height and alignment with minor degenerative changes.        EXAMINATION:    CONSTITUTIONAL:  Vital signs as above.  No acute distress.  The patient is well nourished and well groomed.  She is in no acute distress and transitions and moves fluidly about the room.  PSYCHIATRIC:  The patient is awake, alert, oriented to person, place and time.  The patient is answering questions appropriately with clear speech.  Normal affect.  Able to follow commands  MUSCULOSKELETAL:  Gait is non-antalgic.  The patient is able to heel and toe walk without any difficulty.   Squat and rise normal.   .       NEUROLOGICAL:    Motor:  L1-S1 myotomes 5/5     Sensation to light touch is intact in the bilateral lower extremities.    SLR negative.  Femoral stretch test is positive on the right for right thigh pain     Assessment     Jacob Cai  is a 63 year old y.o. female who presents today for follow-up regarding   Right L3-4 lateral recess disc herniation with right L4 radiculitis, and a positive femoral stretch test    Plan:  We talked at length about options and she prefers to wait this out and see me back in 2 months because  she is getting better and symptomatically has only intermittent right radicular symptoms.  Neurologically she is intact but she does have a positive femoral stretch test and some right L4 distribution sensory symptoms.  I showed her her images and the right L3-4 disc herniation has a water density appearance and that is compatible with her clinical course of an acute disc herniation that is reabsorbing on its own.  If she is not better in 2 months I will recommend a right paramedian L3-4 IL CELESTE and follow-up with the surgeon afterwards if symptoms are not improved.      45 minutes of time spent doing chart review, history and exam, documentation, counseling, education, coordination of care, and other activities as described above.        Advised patient to call or return early if symptoms worsen, or having problems controlling bladder and bowel function or worsening leg weakness.     Please note: Voice recognition software was used in this dictation.  It may therefore contain typographical errors.  Calos Pruett MD

## 2024-08-02 ENCOUNTER — ANCILLARY PROCEDURE (OUTPATIENT)
Dept: MRI IMAGING | Facility: CLINIC | Age: 64
End: 2024-08-02
Attending: PREVENTIVE MEDICINE
Payer: MEDICARE

## 2024-08-02 DIAGNOSIS — R10.2 PELVIC PAIN: ICD-10-CM

## 2024-08-02 DIAGNOSIS — M54.6 CHRONIC MIDLINE THORACIC BACK PAIN: ICD-10-CM

## 2024-08-02 DIAGNOSIS — G89.29 CHRONIC BILATERAL LOW BACK PAIN WITHOUT SCIATICA: ICD-10-CM

## 2024-08-02 DIAGNOSIS — G89.29 CHRONIC MIDLINE THORACIC BACK PAIN: ICD-10-CM

## 2024-08-02 DIAGNOSIS — M54.50 CHRONIC BILATERAL LOW BACK PAIN WITHOUT SCIATICA: ICD-10-CM

## 2024-08-02 PROCEDURE — 72146 MRI CHEST SPINE W/O DYE: CPT | Mod: TC | Performed by: RADIOLOGY

## 2024-08-02 PROCEDURE — G1010 CDSM STANSON: HCPCS | Performed by: RADIOLOGY

## 2024-08-02 PROCEDURE — 72195 MRI PELVIS W/O DYE: CPT | Mod: MG | Performed by: RADIOLOGY

## 2024-08-02 PROCEDURE — 72148 MRI LUMBAR SPINE W/O DYE: CPT | Mod: TC | Performed by: RADIOLOGY

## 2024-08-05 ENCOUNTER — ANCILLARY PROCEDURE (OUTPATIENT)
Dept: MAMMOGRAPHY | Facility: CLINIC | Age: 64
End: 2024-08-05
Attending: PHYSICIAN ASSISTANT
Payer: MEDICARE

## 2024-08-05 DIAGNOSIS — Z12.31 VISIT FOR SCREENING MAMMOGRAM: ICD-10-CM

## 2024-08-05 PROCEDURE — 77063 BREAST TOMOSYNTHESIS BI: CPT | Mod: TC | Performed by: RADIOLOGY

## 2024-08-05 PROCEDURE — 77067 SCR MAMMO BI INCL CAD: CPT | Mod: TC | Performed by: RADIOLOGY

## 2024-08-06 ENCOUNTER — OFFICE VISIT (OUTPATIENT)
Dept: NEUROSURGERY | Facility: CLINIC | Age: 64
End: 2024-08-06
Payer: MEDICARE

## 2024-08-06 VITALS
WEIGHT: 226.2 LBS | HEART RATE: 76 BPM | BODY MASS INDEX: 38.83 KG/M2 | DIASTOLIC BLOOD PRESSURE: 72 MMHG | SYSTOLIC BLOOD PRESSURE: 110 MMHG

## 2024-08-06 DIAGNOSIS — M54.16 LUMBAR RADICULOPATHY: Primary | ICD-10-CM

## 2024-08-06 DIAGNOSIS — M51.26 LUMBAR DISC HERNIATION: ICD-10-CM

## 2024-08-06 PROCEDURE — 99215 OFFICE O/P EST HI 40 MIN: CPT | Performed by: PREVENTIVE MEDICINE

## 2024-08-06 RX ORDER — METHYLPREDNISOLONE 4 MG
TABLET, DOSE PACK ORAL
Qty: 21 TABLET | Refills: 0 | Status: SHIPPED | OUTPATIENT
Start: 2024-08-06 | End: 2024-09-18

## 2024-08-06 NOTE — PATIENT INSTRUCTIONS
Jacob, I am glad that you are feeling better.  You do have a herniated disc which is pushing on the nerve in your right leg.  It seems like it is shrinking down on its own so I want you to gradually increase your activities as you feel better and I am looking forward to seeing you back here in 2 months.  If the leg pain gets much worse or if you are really noticing weakness in that leg then we should move to the injection sooner.  We do not have to do an injection right now as long as you are feeling better.  See the assessment and plan below for further details of our discussion today    Assessment     Jacob Cai  is a 63 year old y.o. female who presents today for follow-up regarding   Right L3-4 lateral recess disc herniation with right L4 radiculitis, and a positive femoral stretch test    Plan:  We talked at length about options and she prefers to wait this out and see me back in 2 months because she is getting better and symptomatically has only intermittent right radicular symptoms.  Neurologically she is intact but she does have a positive femoral stretch test and some right L4 distribution sensory symptoms.  I showed her her images and the right L3-4 disc herniation has a water density appearance and that is compatible with her clinical course of an acute disc herniation that is reabsorbing on its own.  If she is not better in 2 months I will recommend a right paramedian L3-4 IL CELESTE and follow-up with the surgeon afterwards if symptoms are not improved.  In the meantime, I recommend that she gradually increase activities as tolerated.  In the meantime, I recommend that she gradually increase activities as tolerated. Medrol dose pack and stop the meloxicam until you finish the medrol pills.  Watch your blood sugars.

## 2024-08-06 NOTE — LETTER
"8/6/2024      Jacob Cai  [address]  [place] [address]      Dear Colleague,    Thank you for referring your patient, Jacob Cai, to the [facility]UROSURGERY [facility] [place]. Please see a copy of my visit note below.      Subjective:    Jacob Cai is a 63 year old female who presents today for follow-up regarding   Acute idiopathic thoracic, lumbar and pelvic pain     MRI thoracic, lumbar and pelvic MRI and RTC after.     PRIOR HISTORY from 7/9/2024:  She was seen for evaluation of low back pain referred from ED, and 6/13/2024 ED note records:  \"Jacob Cai is a 63 y.o. female who presents to the emergency department for evaluation of back pain. The patient states that she began experiencing pain in her low back and bilateral hips 2 days ago. She also endorses pain radiating into her legs, worse on the left and has had difficulty walking today due to pain. Her daughter notes that she has had increased physical activity over the last few days including walking a lot at her grandda[employer]ush[employer] graduation and while sweeping her porch yesterday. She denies any recent heavy lifting or falls. The patient also denies any hematuria, urinary or bowel incontinence, saddle anesthesia, or fevers. She reports treating with Tylenol and ibuprofen but has not had any medications yet today. No further complaints or concerns are voiced at this time...  ...Patient is not normally very physically active and has participated in activities that have increased her mobility in the past several days, culminating in progressive pain of her back bilaterally. Her pain is not radicular in nature. She did not sustain any trauma and therefore xrays are not indicated due to the low likelihood of fracture or subluxation. There are no red flag symptoms warranting need for CT/MRI. There is no clinical evidence of cauda equina syndrome, discitis, spinal/epidural space hematoma, abscess, or other worrisome " "etiology. There have been no urinary symptoms concerning for pyelonephritis. Her neurologic exam is normal and her symptoms are consistent with a musculoskeletal source with significant muscle spasm. She will be discharged with the prescriptions as noted below. I discussed symptomatic care including Ibuprofen and the use of ice or heat to the back. She will not perform any heavy lifting or significant bending/twisting motions.\"     She saw JOSE Price on 6/17/2024 who added meloxicam and ordered a spine  referral.     An  was present (by phone/virtually) throughout the visit today. After discussing my assessment and treatment recommendations including risks and benefits with the patient/patient s , I asked the patient to convey what they understood about my assessment and recommendations to ensure understanding. The communication back from the patient/ family /parent, as relayed through the , confirmed understanding. The patient was also given time to have all questions answered.        There are no radicular symptoms or red flags for cauda equina or other systemic disease.    INTERIM HISTORY:    An  was present /virtually) throughout the visit today. After discussing my assessment and treatment recommendations including risks and benefits with the patient, I asked the patient  to convey what they understood about my assessment and recommendations to ensure understanding. The  communication back from the patient, as relayed through the , confirmed understanding. The patient  was also given time to have all questions answered.    She has had longstanding tingling in the fingers and toes of both sides unrelated to this injury and unchanged.  She has had some numbness in the right anterior thigh which has gone away but she does have intermittent pain in that area still.  There is no leg weakness but there is a burning sensation in the right thigh.    Her pain is " managed pretty well right now with meloxicam and she is feeling much better than she used to.      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  She posesses virtually no English proficiency.    Objective:    IMAGING:  Images and report reviewed    MR pelvis without contrast 8/2/2024     Castellvi type IIa lumbar transitional anatomy with pseudoarticulation  on the left side.     Sacroiliac joints and pubic symphysis are congruent. Focal subchondral  edema at the right sacroiliac joint sacral aspect (image 23 series 4).  Mild degenerative changes pubic symphysis.    Nonspecific soft tissue edema/small fluid surrounding the proximal  right femoral vein/external iliac vein with preservation of normal T2  flow void. Status post hysterectomy. Scattered colonic diverticula.                                                                      Impression:  1. Bilateral proximal hamstring and gluteus minimus tendinosis. No  high grade tear.  2. Focal subchondral edema at the right sacroiliac joint sacral  aspect, maybe degenerative.  3. Nonspecific soft tissue edema/small fluid surrounding the proximal  right femoral vein/external iliac vein with preservation of normal T2  flow void. Consider correlation with recent intervention in this area.    MRI LUMBAR SPINE WITHOUT CONTRAST   8/2/2024      L3-4: There is a broad-based disc bulge. Right central disc protrusion  with right lateral recess narrowing and mass effect on the descending  right L4 nerve root. Mild left and moderate right foraminal narrowing.     L4-5: Broad-based disc bulge. Ligamentum flavum hypertrophy and facet  arthrosis with mild canal stenosis and mild bilateral foraminal  narrowing.     IMPRESSION:  Degenerative changes in the lumbar spine as detailed  above. This is most significant at L3-4  where a right central  protrusion contributes to right lateral recess narrowing with mass  effect upon the descending right L4 nerve root.     MR THORACIC SPINE W/O CONTRAST 8/2/2024       Impression:  1. No significant spinal canal or neural foraminal stenosis.  2. No abnormal spinal cord signal.           XR PELVIS 1/2 VIEWS: 7/9/2024                                                     IMPRESSION: Both hips negative for fracture or dislocation. Pelvis negative for fracture. Degenerative change at the SI joints bilaterally.     XR LUMBAR SPINE 2/3 VIEWS: 07/09/2024                                                IMPRESSION: Four nonrib-bearing lumbar vertebral bodies with partial sacralization of L5. Mild convex right curvature across the thoracolumbar junction. No subluxation. Normal vertebral body heights. Mild interspace and endplate degeneration at L4-L5.  Mild facet arthropathy.     XR THORACIC SPINE 2 VIEWS: 7/9/2024                                                              IMPRESSION: 12 rib-bearing thoracic vertebral bodies demonstrate normal height and alignment with minor degenerative changes.        EXAMINATION:    CONSTITUTIONAL:  Vital signs as above.  No acute distress.  The patient is well nourished and well groomed.  She is in no acute distress and transitions and moves fluidly about the room.  PSYCHIATRIC:  The patient is awake, alert, oriented to person, place and time.  The patient is answering questions appropriately with clear speech.  Normal affect.  Able to follow commands  MUSCULOSKELETAL:  Gait is non-antalgic.  The patient is able to heel and toe walk without any difficulty.   Squat and rise normal.   .       NEUROLOGICAL:    Motor:  L1-S1 myotomes 5/5     Sensation to light touch is intact in the bilateral lower extremities.    SLR negative.  Femoral stretch test is positive on the right for right thigh pain     Assessment     Jacob Cai  is a 63 year old y.o. female who presents  today for follow-up regarding   Right L3-4 lateral recess disc herniation with right L4 radiculitis, and a positive femoral stretch test    Plan:  We talked at length about options and she prefers to wait this out and see me back in 2 months because she is getting better and symptomatically has only intermittent right radicular symptoms.  Neurologically she is intact but she does have a positive femoral stretch test and some right L4 distribution sensory symptoms.  I showed her her images and the right L3-4 disc herniation has a water density appearance and that is compatible with her clinical course of an acute disc herniation that is reabsorbing on its own.  If she is not better in 2 months I will recommend a right paramedian L3-4 IL CELESTE and follow-up with the surgeon afterwards if symptoms are not improved.      45 minutes of time spent doing chart review, history and exam, documentation, counseling, education, coordination of care, and other activities as described above.        Advised patient to call or return early if symptoms worsen, or having problems controlling bladder and bowel function or worsening leg weakness.     Please note: Voice recognition software was used in this dictation.  It may therefore contain typographical errors.  Calos Pruett MD      Again, thank you for allowing me to participate in the care of your patient.        Sincerely,        Calos Pruett MD

## 2024-08-12 DIAGNOSIS — G89.29 CHRONIC BILATERAL LOW BACK PAIN WITHOUT SCIATICA: ICD-10-CM

## 2024-08-12 DIAGNOSIS — M54.50 CHRONIC BILATERAL LOW BACK PAIN WITHOUT SCIATICA: ICD-10-CM

## 2024-08-13 DIAGNOSIS — F32.0 MILD MAJOR DEPRESSION (H): ICD-10-CM

## 2024-08-13 RX ORDER — MELOXICAM 7.5 MG/1
7.5 TABLET ORAL DAILY
Qty: 30 TABLET | Refills: 1 | Status: SHIPPED | OUTPATIENT
Start: 2024-08-13

## 2024-08-13 RX ORDER — SERTRALINE HYDROCHLORIDE 100 MG/1
200 TABLET, FILM COATED ORAL DAILY
Qty: 180 TABLET | Refills: 0 | Status: SHIPPED | OUTPATIENT
Start: 2024-08-13

## 2024-08-19 DIAGNOSIS — E11.9 TYPE 2 DIABETES MELLITUS WITHOUT COMPLICATION, WITHOUT LONG-TERM CURRENT USE OF INSULIN (H): ICD-10-CM

## 2024-09-12 DIAGNOSIS — E11.9 TYPE 2 DIABETES MELLITUS WITHOUT COMPLICATION, WITHOUT LONG-TERM CURRENT USE OF INSULIN (H): ICD-10-CM

## 2024-09-12 RX ORDER — INSULIN GLARGINE 100 [IU]/ML
27 INJECTION, SOLUTION SUBCUTANEOUS AT BEDTIME
Qty: 30 ML | Refills: 0 | Status: SHIPPED | OUTPATIENT
Start: 2024-09-12 | End: 2024-09-18

## 2024-09-18 ENCOUNTER — OFFICE VISIT (OUTPATIENT)
Dept: FAMILY MEDICINE | Facility: CLINIC | Age: 64
End: 2024-09-18
Payer: MEDICARE

## 2024-09-18 VITALS
BODY MASS INDEX: 39.61 KG/M2 | SYSTOLIC BLOOD PRESSURE: 124 MMHG | TEMPERATURE: 97.5 F | DIASTOLIC BLOOD PRESSURE: 62 MMHG | OXYGEN SATURATION: 92 % | RESPIRATION RATE: 22 BRPM | HEART RATE: 74 BPM | WEIGHT: 232 LBS | HEIGHT: 64 IN

## 2024-09-18 DIAGNOSIS — E11.9 TYPE 2 DIABETES MELLITUS WITHOUT COMPLICATION, WITHOUT LONG-TERM CURRENT USE OF INSULIN (H): Primary | ICD-10-CM

## 2024-09-18 DIAGNOSIS — E78.5 HYPERLIPIDEMIA LDL GOAL <100: ICD-10-CM

## 2024-09-18 LAB
EST. AVERAGE GLUCOSE BLD GHB EST-MCNC: 186 MG/DL
HBA1C MFR BLD: 8.1 % (ref 0–5.6)

## 2024-09-18 PROCEDURE — 36415 COLL VENOUS BLD VENIPUNCTURE: CPT | Performed by: PHYSICIAN ASSISTANT

## 2024-09-18 PROCEDURE — 99213 OFFICE O/P EST LOW 20 MIN: CPT | Performed by: PHYSICIAN ASSISTANT

## 2024-09-18 PROCEDURE — 83036 HEMOGLOBIN GLYCOSYLATED A1C: CPT | Performed by: PHYSICIAN ASSISTANT

## 2024-09-18 RX ORDER — METHYLPREDNISOLONE 4 MG
TABLET, DOSE PACK ORAL
Qty: 21 TABLET | Refills: 0 | Status: CANCELLED | OUTPATIENT
Start: 2024-09-18

## 2024-09-18 RX ORDER — INSULIN GLARGINE 100 [IU]/ML
27 INJECTION, SOLUTION SUBCUTANEOUS AT BEDTIME
Qty: 30 ML | Refills: 0 | Status: SHIPPED | OUTPATIENT
Start: 2024-09-18 | End: 2024-09-20

## 2024-09-18 RX ORDER — GLIPIZIDE 10 MG/1
20 TABLET, FILM COATED, EXTENDED RELEASE ORAL DAILY
Qty: 180 TABLET | Refills: 0 | Status: SHIPPED | OUTPATIENT
Start: 2024-09-18

## 2024-09-18 RX ORDER — INSULIN DETEMIR 100 [IU]/ML
27 INJECTION, SOLUTION SUBCUTANEOUS AT BEDTIME
Qty: 30 ML | Refills: 0 | Status: SHIPPED | OUTPATIENT
Start: 2024-09-18 | End: 2024-09-20

## 2024-09-18 RX ORDER — ATORVASTATIN CALCIUM 20 MG/1
20 TABLET, FILM COATED ORAL DAILY
Qty: 90 TABLET | Refills: 0 | Status: SHIPPED | OUTPATIENT
Start: 2024-09-18

## 2024-09-18 ASSESSMENT — ANXIETY QUESTIONNAIRES
7. FEELING AFRAID AS IF SOMETHING AWFUL MIGHT HAPPEN: NOT AT ALL
GAD7 TOTAL SCORE: 9
GAD7 TOTAL SCORE: 9
8. IF YOU CHECKED OFF ANY PROBLEMS, HOW DIFFICULT HAVE THESE MADE IT FOR YOU TO DO YOUR WORK, TAKE CARE OF THINGS AT HOME, OR GET ALONG WITH OTHER PEOPLE?: SOMEWHAT DIFFICULT
GAD7 TOTAL SCORE: 9

## 2024-09-18 ASSESSMENT — PATIENT HEALTH QUESTIONNAIRE - PHQ9
10. IF YOU CHECKED OFF ANY PROBLEMS, HOW DIFFICULT HAVE THESE PROBLEMS MADE IT FOR YOU TO DO YOUR WORK, TAKE CARE OF THINGS AT HOME, OR GET ALONG WITH OTHER PEOPLE: VERY DIFFICULT
SUM OF ALL RESPONSES TO PHQ QUESTIONS 1-9: 10
SUM OF ALL RESPONSES TO PHQ QUESTIONS 1-9: 10

## 2024-09-18 NOTE — PROGRESS NOTES
"  Assessment & Plan       ICD-10-CM    1. Type 2 diabetes mellitus without complication, without long-term current use of insulin (H)  E11.9 glipiZIDE (GLUCOTROL XL) 10 MG 24 hr tablet     insulin detemir (LEVEMIR FLEXPEN/FLEXTOUCH) 100 UNIT/ML pen     Hemoglobin A1c     Hemoglobin A1c     insulin glargine (LANTUS SOLOSTAR) 100 UNIT/ML pen     Dulaglutide 3 MG/0.5ML SOPN      2. Hyperlipidemia LDL goal <100  E78.5 atorvastatin (LIPITOR) 20 MG tablet          1) A1c improved, but still above goal. Increase Trulicity to 3mg weekly. Follow up in 3 months    2) Med renewed, no change      BMI  Estimated body mass index is 39.82 kg/m  as calculated from the following:    Height as of this encounter: 1.626 m (5' 4\").    Weight as of this encounter: 105.2 kg (232 lb).     Return in about 3 months (around 12/18/2024) for your annual medication check, fasting labs, with Clara, in person.      Atul James is a 63 year old, presenting for the following health issues:  Diabetes      9/18/2024     9:15 AM   Additional Questions   Roomed by Marta   Accompanied by radha         9/18/2024     9:15 AM   Patient Reported Additional Medications   Patient reports taking the following new medications none     History of Present Illness       Diabetes:   She presents for follow up of diabetes.  She is checking home blood glucose a few times a week.   She checks blood glucose before and after meals.  Blood glucose is sometimes over 200 and never under 70.  When her blood glucose is low, the patient is asymptomatic for confusion, blurred vision, lethargy and reports not feeling dizzy, shaky, or weak.   She has no concerns regarding her diabetes at this time.  She is having numbness in feet.  The patient has not had a diabetic eye exam in the last 12 months.          She eats 2-3 servings of fruits and vegetables daily.She consumes 0 sweetened beverage(s) daily.She exercises with enough effort to increase her heart rate 9 or less " "minutes per day.  She exercises with enough effort to increase her heart rate 3 or less days per week.   She is taking medications regularly.     Has been off insulin and Trulicity x2 weeks, ran out        Review of Systems  Constitutional, endocrine systems are negative, except as otherwise noted.      Objective    /62   Pulse 74   Temp 97.5  F (36.4  C) (Temporal)   Resp 22   Ht 1.626 m (5' 4\")   Wt 105.2 kg (232 lb)   LMP  (LMP Unknown)   SpO2 92%   BMI 39.82 kg/m    Body mass index is 39.82 kg/m .  Physical Exam   GENERAL: alert and no distress  MS: no gross musculoskeletal defects noted, no edema  SKIN: no suspicious lesions or rashes  NEURO: Normal strength and tone, mentation intact and speech normal  PSYCH: mentation appears normal, affect normal/bright    Results for orders placed or performed in visit on 09/18/24 (from the past 24 hour(s))   Hemoglobin A1c   Result Value Ref Range    Estimated Average Glucose 186 (H) <117 mg/dL    Hemoglobin A1C 8.1 (H) 0.0 - 5.6 %    Narrative    Results confirmed by repeat test.            Signed Electronically by: Clara Ambriz PA-C    "

## 2024-10-01 RX ORDER — INSULIN GLARGINE 100 [IU]/ML
27 INJECTION, SOLUTION SUBCUTANEOUS AT BEDTIME
Qty: 15 ML | Refills: 0 | OUTPATIENT
Start: 2024-10-01

## 2024-10-08 ENCOUNTER — OFFICE VISIT (OUTPATIENT)
Dept: NEUROSURGERY | Facility: CLINIC | Age: 64
End: 2024-10-08
Payer: MEDICARE

## 2024-10-08 VITALS
WEIGHT: 226.4 LBS | BODY MASS INDEX: 38.86 KG/M2 | DIASTOLIC BLOOD PRESSURE: 75 MMHG | SYSTOLIC BLOOD PRESSURE: 127 MMHG | HEART RATE: 82 BPM

## 2024-10-08 DIAGNOSIS — M51.26 LUMBAR DISC HERNIATION: ICD-10-CM

## 2024-10-08 DIAGNOSIS — M54.16 LUMBAR RADICULOPATHY: Primary | ICD-10-CM

## 2024-10-08 DIAGNOSIS — G89.29 CHRONIC BILATERAL LOW BACK PAIN WITHOUT SCIATICA: ICD-10-CM

## 2024-10-08 DIAGNOSIS — M54.50 CHRONIC BILATERAL LOW BACK PAIN WITHOUT SCIATICA: ICD-10-CM

## 2024-10-08 PROCEDURE — 99214 OFFICE O/P EST MOD 30 MIN: CPT | Performed by: PREVENTIVE MEDICINE

## 2024-10-08 NOTE — LETTER
"10/8/2024      Jacob Cai  [address]  [place] [address]      Dear Colleague,    Thank you for referring your patient, Jacob Cai, to the [facility]OS[facility] [place]. Please see a copy of my visit note below.      Subjective:    Jacob Cai is a 63 year old female who presents today for follow-up regarding   Right L3-4 lateral recess disc herniation with right L4 radiculitis, and a positive femoral stretch test   8/6/2024 plan:  We talked at length about options and she prefers to wait this out and see me back in 2 months because she is getting better and symptomatically has only intermittent right radicular symptoms. Neurologically she is intact but she does have a positive femoral stretch test and some right L4 distribution sensory symptoms. I showed her her images and the right L3-4 disc herniation has a water density appearance and that is compatible with her clinical course of an acute disc herniation that is reabsorbing on its own. If she is not better in 2 months I will recommend a right paramedian L3-4 IL CELESTE and follow-up with the surgeon afterwards if symptoms are not improved.    PRIOR HISTORY from 7/9/2024:  She was seen for evaluation of low back pain referred from ED, and 6/13/2024 ED note records:  \"Jacob Cai is a 63 y.o. female who presents to the emergency department for evaluation of back pain. The patient states that she began experiencing pain in her low back and bilateral hips 2 days ago. She also endorses pain radiating into her legs, worse on the left and has had difficulty walking today due to pain. Her daughter notes that she has had increased physical activity over the last few days including walking a lot at her granddaughters graduation and while sweeping her porch yesterday. She denies any recent heavy lifting or falls. The patient also denies any hematuria, urinary or bowel incontinence, saddle anesthesia, or fevers. She " "reports treating with Tylenol and ibuprofen but has not had any medications yet today. No further complaints or concerns are voiced at this time...  ...Patient is not normally very physically active and has participated in activities that have increased her mobility in the past several days, culminating in progressive pain of her back bilaterally. Her pain is not radicular in nature. She did not sustain any trauma and therefore xrays are not indicated due to the low likelihood of fracture or subluxation. There are no red flag symptoms warranting need for CT/MRI. There is no clinical evidence of cauda equina syndrome, discitis, spinal/epidural space hematoma, abscess, or other worrisome etiology. There have been no urinary symptoms concerning for pyelonephritis. Her neurologic exam is normal and her symptoms are consistent with a musculoskeletal source with significant muscle spasm. She will be discharged with the prescriptions as noted below. I discussed symptomatic care including Ibuprofen and the use of ice or heat to the back. She will not perform any heavy lifting or significant bending/twisting motions.\"     She saw JOSE Price on 6/17/2024 who added meloxicam and ordered a spine  referral.     There are no radicular symptoms or red flags for cauda equina or other systemic disease.     8/6/2024 HISTORY:    An  was present /virtually) throughout the visit today. After discussing my assessment and treatment recommendations including risks and benefits with the patient, I asked the patient  to convey what they understood about my assessment and recommendations to ensure understanding. The communication back from the patient, as relayed through the , confirmed understanding. The patient  was also given time to have all questions answered.     She has had longstanding tingling in the fingers and toes of both sides unrelated to this injury and unchanged.  She has had some numbness " "in the right anterior thigh which has gone away but she does have intermittent pain in that area still.  There is no leg weakness but there is a burning sensation in the right thigh.     Her pain is managed pretty well right now with meloxicam and she is feeling much better than she used to.    INTERIM HISTORY:  An  was present (by phone/virtually) throughout the visit today. After discussing my assessment and treatment recommendations including risks and benefits with the patient/patient s , I asked the patient to convey what they understood about my assessment and recommendations to ensure understanding. The communication back from the patient/ family /parent, as relayed through the , confirmed understanding. The patient was also given time to have all questions answered.     She states that her back is \"so-so but all of her leg pain is gone except for the pre-existing tingling she has had in both of her feet especially when she sleeps at night.  No sensory deficit or motor deficits.       PAST MEDICAL/SURGICAL HISTORY:   Pertinent for depression, anxiety, CHF, hypertension, total thyroidectomy, DM2, morbid obesity, GERD     SOCIAL HX: She is from [facility] and is a retired meat and bread cutter.  She posesses virtually no English proficiency.    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      Sacroiliac joints and pubic symphysis are congruent. Focal subchondral  edema at the right sacroiliac joint sacral aspect (image 23 series 4).  Mild degenerative changes pubic symphysis.     Nonspecific soft tissue edema/small fluid surrounding the proximal  right femoral vein/external iliac vein with preservation of normal T2  flow void. Status post hysterectomy. Scattered colonic diverticula.                                                                    "   Impression:  1. Bilateral proximal hamstring and gluteus minimus tendinosis. No  high grade tear.  2. Focal subchondral edema at the right sacroiliac joint sacral  aspect, maybe degenerative.  3. Nonspecific soft tissue edema/small fluid surrounding the proximal  right femoral vein/external iliac vein with preservation of normal T2  flow void. Consider correlation with recent intervention in this area.     MRI LUMBAR SPINE WITHOUT CONTRAST   8/2/2024       L3-4: There is a broad-based disc bulge. Right central disc protrusion  with right lateral recess narrowing and mass effect on the descending  right L4 nerve root. Mild left and moderate right foraminal narrowing.     L4-5: Broad-based disc bulge. Ligamentum flavum hypertrophy and facet  arthrosis with mild canal stenosis and mild bilateral foraminal  narrowing.      IMPRESSION:  Degenerative changes in the lumbar spine as detailed  above. This is most significant at L3-4 where a right central  protrusion contributes to right lateral recess narrowing with mass  effect upon the descending right L4 nerve root.      MR THORACIC SPINE W/O CONTRAST 8/2/2024        Impression:  1. No significant spinal canal or neural foraminal stenosis.  2. No abnormal spinal cord signal.             XR PELVIS 1/2 VIEWS: 7/9/2024                                                     IMPRESSION: Both hips negative for fracture or dislocation. Pelvis negative for fracture. Degenerative change at the SI joints bilaterally.     XR LUMBAR SPINE 2/3 VIEWS: 07/09/2024                                                IMPRESSION: Four nonrib-bearing lumbar vertebral bodies with partial sacralization of L5. Mild convex right curvature across the thoracolumbar junction. No subluxation. Normal vertebral body heights. Mild interspace and endplate degeneration at L4-L5.  Mild facet arthropathy.     XR THORACIC SPINE 2 VIEWS: 7/9/2024                                                               IMPRESSION: 12 rib-bearing thoracic vertebral bodies demonstrate normal height and alignment with minor degenerative changes.     EXAMINATION:    CONSTITUTIONAL:  Vital signs as above.  No acute distress.  The patient is well nourished and well groomed.  She transitions and moves fluidly  PSYCHIATRIC:  The patient is awake, alert, oriented to person, place and time.  The patient is answering questions appropriately with clear speech.  Normal affect.  Able to follow commands  MUSCULOSKELETAL:  Gait is non-antalgic.  The patient is able to heel and toe walk without any difficulty.   Squat and rise normal.   .  Back ROM: She has some back pain but no leg pain with forward flexion and less so with extension..     NEUROLOGICAL:    Motor:  L1-S1 myotomes 5/5     Sensation to light touch is intact in the bilateral lower extremities.    SLR negative.  Femoral stretch test bilaterally negative for leg pain but positive for low back pain      Assessment     Jacob Cai  is a 63 year old y.o. female who presents today for follow-up regarding   Right L3-4 lateral recess disc herniation with resolved right L4 radiculitis, and femoral stretch test       Plan:  Reassurance.  At this point we do not need to treat that herniated disc.  Her back pain is a chronic condition and her pre-existing bilateral leg tingling is a pre-existing condition.  No formal treatment or follow-up needed at this time.      25 minutes of time spent doing chart review, history and exam, documentation, counseling, education, coordination of care, and other activities as described above.        Advised patient to call or return early if symptoms worsen, or having problems controlling bladder and bowel function or worsening leg weakness.     Please note: Voice recognition software was used in this dictation.  It may therefore contain typographical errors.  Calos Pruett MD      Again, thank you for allowing me to participate in the care of your  patient.        Sincerely,        Calos Pruett MD

## 2024-10-08 NOTE — PATIENT INSTRUCTIONS
I am glad that your herniated disc is no longer pushing on the nerves so we do not need to treat that.  The chronic pain you have had in your low back is back to where it used to be and I do not think that needs any injections or surgery.  See the assessment and plan below for further details of our conversation today.  No further treatment or follow-up is needed for now.    Assessment     Jacob Cai  is a 63 year old y.o. female who presents today for follow-up regarding   Right L3-4 lateral recess disc herniation with resolved right L4 radiculitis, and femoral stretch test       Plan:  Reassurance.  At this point we do not need to treat that herniated disc.  Her back pain is a chronic condition and her pre-existing bilateral leg tingling is a pre-existing condition.  No formal treatment or follow-up needed at this time.

## 2024-12-16 ENCOUNTER — TELEPHONE (OUTPATIENT)
Dept: FAMILY MEDICINE | Facility: CLINIC | Age: 64
End: 2024-12-16
Payer: MEDICARE

## 2024-12-16 DIAGNOSIS — E78.5 HYPERLIPIDEMIA LDL GOAL <100: ICD-10-CM

## 2024-12-16 DIAGNOSIS — E11.9 TYPE 2 DIABETES MELLITUS WITHOUT COMPLICATION, WITHOUT LONG-TERM CURRENT USE OF INSULIN (H): ICD-10-CM

## 2024-12-16 RX ORDER — ATORVASTATIN CALCIUM 20 MG/1
20 TABLET, FILM COATED ORAL DAILY
Qty: 90 TABLET | Refills: 0 | OUTPATIENT
Start: 2024-12-16

## 2024-12-16 RX ORDER — GLIPIZIDE 10 MG/1
20 TABLET, FILM COATED, EXTENDED RELEASE ORAL DAILY
Qty: 180 TABLET | Refills: 0 | Status: SHIPPED | OUTPATIENT
Start: 2024-12-16 | End: 2024-12-18

## 2024-12-18 ENCOUNTER — OFFICE VISIT (OUTPATIENT)
Dept: FAMILY MEDICINE | Facility: CLINIC | Age: 64
End: 2024-12-18
Payer: MEDICARE

## 2024-12-18 VITALS
TEMPERATURE: 97.9 F | RESPIRATION RATE: 20 BRPM | WEIGHT: 230 LBS | HEART RATE: 72 BPM | DIASTOLIC BLOOD PRESSURE: 64 MMHG | OXYGEN SATURATION: 92 % | SYSTOLIC BLOOD PRESSURE: 110 MMHG | BODY MASS INDEX: 39.27 KG/M2 | HEIGHT: 64 IN

## 2024-12-18 DIAGNOSIS — Z00.00 ENCOUNTER FOR ROUTINE ADULT HEALTH EXAMINATION WITHOUT ABNORMAL FINDINGS: Primary | ICD-10-CM

## 2024-12-18 DIAGNOSIS — R20.0 NUMBNESS OF LEFT HAND: ICD-10-CM

## 2024-12-18 DIAGNOSIS — F33.1 MAJOR DEPRESSIVE DISORDER, RECURRENT EPISODE, MODERATE (H): ICD-10-CM

## 2024-12-18 DIAGNOSIS — E78.5 HYPERLIPIDEMIA LDL GOAL <100: ICD-10-CM

## 2024-12-18 DIAGNOSIS — M54.50 CHRONIC BILATERAL LOW BACK PAIN WITHOUT SCIATICA: ICD-10-CM

## 2024-12-18 DIAGNOSIS — M54.2 CERVICALGIA: ICD-10-CM

## 2024-12-18 DIAGNOSIS — G89.29 CHRONIC BILATERAL LOW BACK PAIN WITHOUT SCIATICA: ICD-10-CM

## 2024-12-18 DIAGNOSIS — E66.01 MORBID OBESITY (H): ICD-10-CM

## 2024-12-18 DIAGNOSIS — E11.9 TYPE 2 DIABETES MELLITUS WITHOUT COMPLICATION, WITHOUT LONG-TERM CURRENT USE OF INSULIN (H): ICD-10-CM

## 2024-12-18 DIAGNOSIS — I10 BENIGN ESSENTIAL HYPERTENSION WITH TARGET BLOOD PRESSURE BELOW 140/90: ICD-10-CM

## 2024-12-18 PROBLEM — I50.9 CHRONIC CONGESTIVE HEART FAILURE, UNSPECIFIED HEART FAILURE TYPE (H): Status: RESOLVED | Noted: 2023-12-19 | Resolved: 2024-12-18

## 2024-12-18 LAB
ALT SERPL W P-5'-P-CCNC: 27 U/L (ref 0–50)
CHOLEST SERPL-MCNC: 128 MG/DL
CREAT UR-MCNC: 103 MG/DL
EST. AVERAGE GLUCOSE BLD GHB EST-MCNC: 186 MG/DL
FASTING STATUS PATIENT QL REPORTED: YES
HBA1C MFR BLD: 8.1 % (ref 0–5.6)
HDLC SERPL-MCNC: 24 MG/DL
LDLC SERPL CALC-MCNC: 65 MG/DL
MICROALBUMIN UR-MCNC: <12 MG/L
MICROALBUMIN/CREAT UR: NORMAL MG/G{CREAT}
NONHDLC SERPL-MCNC: 104 MG/DL
TRIGL SERPL-MCNC: 193 MG/DL

## 2024-12-18 PROCEDURE — 82570 ASSAY OF URINE CREATININE: CPT | Performed by: PHYSICIAN ASSISTANT

## 2024-12-18 PROCEDURE — 36415 COLL VENOUS BLD VENIPUNCTURE: CPT | Performed by: PHYSICIAN ASSISTANT

## 2024-12-18 PROCEDURE — 82043 UR ALBUMIN QUANTITATIVE: CPT | Performed by: PHYSICIAN ASSISTANT

## 2024-12-18 PROCEDURE — 99214 OFFICE O/P EST MOD 30 MIN: CPT | Mod: 25 | Performed by: PHYSICIAN ASSISTANT

## 2024-12-18 PROCEDURE — 99396 PREV VISIT EST AGE 40-64: CPT | Performed by: PHYSICIAN ASSISTANT

## 2024-12-18 PROCEDURE — 84460 ALANINE AMINO (ALT) (SGPT): CPT | Performed by: PHYSICIAN ASSISTANT

## 2024-12-18 PROCEDURE — 83036 HEMOGLOBIN GLYCOSYLATED A1C: CPT | Performed by: PHYSICIAN ASSISTANT

## 2024-12-18 PROCEDURE — 80061 LIPID PANEL: CPT | Performed by: PHYSICIAN ASSISTANT

## 2024-12-18 PROCEDURE — 99207 PR FOOT EXAM NO CHARGE: CPT | Performed by: PHYSICIAN ASSISTANT

## 2024-12-18 RX ORDER — INSULIN GLARGINE 100 [IU]/ML
27 INJECTION, SOLUTION SUBCUTANEOUS AT BEDTIME
Qty: 30 ML | Refills: 0 | Status: SHIPPED | OUTPATIENT
Start: 2024-12-18

## 2024-12-18 RX ORDER — METOPROLOL SUCCINATE 100 MG/1
100 TABLET, EXTENDED RELEASE ORAL DAILY
Qty: 90 TABLET | Refills: 3 | Status: SHIPPED | OUTPATIENT
Start: 2024-12-18

## 2024-12-18 RX ORDER — ATORVASTATIN CALCIUM 20 MG/1
20 TABLET, FILM COATED ORAL DAILY
Qty: 90 TABLET | Refills: 3 | Status: SHIPPED | OUTPATIENT
Start: 2024-12-18

## 2024-12-18 RX ORDER — GLIPIZIDE 10 MG/1
20 TABLET, FILM COATED, EXTENDED RELEASE ORAL DAILY
Qty: 180 TABLET | Refills: 1 | Status: SHIPPED | OUTPATIENT
Start: 2024-12-18

## 2024-12-18 RX ORDER — MELOXICAM 7.5 MG/1
7.5 TABLET ORAL DAILY
Qty: 90 TABLET | Refills: 3 | Status: SHIPPED | OUTPATIENT
Start: 2024-12-18

## 2024-12-18 RX ORDER — METFORMIN HYDROCHLORIDE 500 MG/1
1000 TABLET, EXTENDED RELEASE ORAL 2 TIMES DAILY WITH MEALS
Qty: 360 TABLET | Refills: 1 | Status: SHIPPED | OUTPATIENT
Start: 2024-12-18

## 2024-12-18 RX ORDER — SERTRALINE HYDROCHLORIDE 100 MG/1
200 TABLET, FILM COATED ORAL DAILY
Qty: 180 TABLET | Refills: 0 | Status: SHIPPED | OUTPATIENT
Start: 2024-12-18

## 2024-12-18 RX ORDER — HYDROCHLOROTHIAZIDE 50 MG/1
50 TABLET ORAL DAILY
Qty: 90 TABLET | Refills: 3 | Status: SHIPPED | OUTPATIENT
Start: 2024-12-18

## 2024-12-18 SDOH — HEALTH STABILITY: PHYSICAL HEALTH: ON AVERAGE, HOW MANY DAYS PER WEEK DO YOU ENGAGE IN MODERATE TO STRENUOUS EXERCISE (LIKE A BRISK WALK)?: 0 DAYS

## 2024-12-18 SDOH — HEALTH STABILITY: PHYSICAL HEALTH: ON AVERAGE, HOW MANY MINUTES DO YOU ENGAGE IN EXERCISE AT THIS LEVEL?: 0 MIN

## 2024-12-18 ASSESSMENT — SOCIAL DETERMINANTS OF HEALTH (SDOH): HOW OFTEN DO YOU GET TOGETHER WITH FRIENDS OR RELATIVES?: MORE THAN THREE TIMES A WEEK

## 2024-12-18 ASSESSMENT — PATIENT HEALTH QUESTIONNAIRE - PHQ9
SUM OF ALL RESPONSES TO PHQ QUESTIONS 1-9: 9
10. IF YOU CHECKED OFF ANY PROBLEMS, HOW DIFFICULT HAVE THESE PROBLEMS MADE IT FOR YOU TO DO YOUR WORK, TAKE CARE OF THINGS AT HOME, OR GET ALONG WITH OTHER PEOPLE: SOMEWHAT DIFFICULT
SUM OF ALL RESPONSES TO PHQ QUESTIONS 1-9: 9

## 2024-12-18 NOTE — PROGRESS NOTES
Preventive Care Visit  [facility] CASA Ambriz PA-C, Family Medicine  Dec 18, 2024      Assessment & Plan       ICD-10-CM    1. Encounter for routine adult health examination without abnormal findings  Z00.00       2. Benign essential hypertension with target blood pressure below 140/90  I10 hydrochlorothiazide (HYDRODIURIL) 50 MG tablet     metoprolol succinate ER (TOPROL XL) 100 MG 24 hr tablet      3. Type 2 diabetes mellitus without complication, without long-term current use of insulin (H)  E11.9 Lipid panel reflex to direct LDL Fasting     Albumin Random Urine Quantitative with Creat Ratio     insulin pen needle (32G X 4 MM) 32G X 4 MM miscellaneous     Dulaglutide 3 MG/0.5ML SOAJ     glipiZIDE (GLUCOTROL XL) 10 MG 24 hr tablet     insulin glargine (LANTUS SOLOSTAR) 100 UNIT/ML pen     Hemoglobin A1c     FOOT EXAM     metFORMIN (GLUCOPHAGE XR) 500 MG 24 hr tablet     Lipid panel reflex to direct LDL Fasting     Albumin Random Urine Quantitative with Creat Ratio     Hemoglobin A1c      4. Hyperlipidemia LDL goal <100  E78.5 ALT     atorvastatin (LIPITOR) 20 MG tablet     ALT      5. Numbness of left hand  R20.0 MR Cervical Spine w/o Contrast      6. Cervicalgia  M54.2 MR Cervical Spine w/o Contrast      7. Chronic bilateral low back pain without sciatica  M54.50 meloxicam (MOBIC) 7.5 MG tablet    G89.29       8. Major depressive disorder, recurrent episode, moderate (H)  F33.1 sertraline (ZOLOFT) 100 MG tablet      9. Morbid obesity (H)  E66.01           1) Screenings/preventative measures discussed    2-4) Blood pressure at goal. Routine labs in process. Will adjust meds based on her A1c result today.     5,6) Cervical MRI ordered. May consider an EMG as well    7,8) Stable. Meds renewed, no changes    9) Comorbidity      Nicotine/Tobacco Cessation  She reports that she has been smoking cigarettes. She started smoking about 30 years ago. She has a 7.7 pack-year smoking history.  "She has never used smokeless tobacco.    BMI  Estimated body mass index is 39.46 kg/m  as calculated from the following:    Height as of this encounter: 1.626 m (5' 4.02\").    Weight as of this encounter: 104.3 kg (230 lb).     Counseling  Appropriate preventive services were addressed with this patient via screening, questionnaire, or discussion as appropriate for fall prevention, nutrition, physical activity, Tobacco-use cessation, social engagement, weight loss and cognition.  Checklist reviewing preventive services available has been given to the patient.  Reviewed patient's diet, addressing concerns and/or questions.   The patient was instructed to see the dentist every 6 months.   She is at risk for psychosocial distress and has been provided with information to reduce risk.   Discussed possible causes of fatigue. The patient's PHQ-9 score is consistent with mild depression. She was provided with information regarding depression.     Return for follow up pending lab and/or imaging results.       Atul James is a 64 year old, presenting for the following:  Physical        12/18/2024     9:14 AM   Additional Questions   Roomed by Marta   Accompanied by radha         12/18/2024     9:14 AM   Patient Reported Additional Medications   Patient reports taking the following new medications none           History of Present Illness       Diabetes:   She presents for follow up of diabetes.  She is checking home blood glucose a few times a week.   She checks blood glucose before and after meals.  Blood glucose is sometimes over 200 and never under 70. She is aware of hypoglycemia symptoms including shakiness, dizziness and weakness.    She has no concerns regarding her diabetes at this time.  She is having numbness in feet and burning in feet.  The patient has had a diabetic eye exam in the last 12 months.          Hyperlipidemia:  She presents for follow up of hyperlipidemia.   She is taking medication to lower " cholesterol. She is not having myalgia or other side effects to statin medications.    Reason for visit:  Left hand pain  Symptom onset:  3-4 weeks ago  Symptoms include:  Hard to nake a fist, numbness and finger pain  Symptom intensity:  Moderate  Symptom progression:  Worsening  Had these symptoms before:  No     Intermittent numbness and burning sensation      Usually checks sugars after eating  167, 170, 200+      Health Care Directive  Patient does not have a Health Care Directive      12/18/2024   General Health   How would you rate your overall physical health? (!) FAIR   Feel stress (tense, anxious, or unable to sleep) To some extent      (!) STRESS CONCERN      12/18/2024   Nutrition   Diet: Regular (no restrictions)            12/18/2024   Exercise   Days per week of moderate/strenous exercise 0 days   Average minutes spent exercising at this level 0 min      (!) EXERCISE CONCERN      12/18/2024   Social Factors   Frequency of gathering with friends or relatives More than three times a week   Worry food won't last until get money to buy more No   Food not last or not have enough money for food? No   Do you have housing? (Housing is defined as stable permanent housing and does not include staying ouside in a car, in a tent, in an abandoned building, in an overnight shelter, or couch-surfing.) Yes   Are you worried about losing your housing? No   Lack of transportation? No   Unable to get utilities (heat,electricity)? No            12/18/2024   Fall Risk   Fallen 2 or more times in the past year? No    Trouble with walking or balance? No        Patient-reported          12/18/2024   Activities of Daily Living- Home Safety   Needs help with the following daily activites None of the above   Safety concerns in the home None of the above            12/18/2024   Dental   Dentist two times every year? (!) NO            12/18/2024   Hearing Screening   Hearing concerns? None of the above            12/18/2024    Driving Risk Screening   Patient/family members have concerns about driving No            12/18/2024   General Alertness/Fatigue Screening   Have you been more tired than usual lately? (!) YES            12/18/2024   Urinary Incontinence Screening   Bothered by leaking urine in past 6 months No            12/18/2024   TB Screening   Were you born outside of the [place]? Yes          Today's PHQ-9 Score:       12/18/2024     9:07 AM   PHQ-9 SCORE   PHQ-9 Total Score MyChart 9 (Mild depression)   PHQ-9 Total Score 9        Patient-reported         12/18/2024   Substance Use   Alcohol more than 3/day or more than 7/wk No   Do you have a current opioid prescription? No   How severe/bad is pain from 1 to 10? 5/10   Do you use any other substances recreationally? No        Social History     Tobacco Use    Smoking status: Former     Types: Cigarettes    Smokeless tobacco: Never   Vaping Use    Vaping status: Never Used   Substance Use Topics    Alcohol use: No    Drug use: No           8/5/2024   LAST FHS-7 RESULTS   1st degree relative breast or ovarian cancer No   Any relative bilateral breast cancer No   Any male have breast cancer No   Any ONE woman have BOTH breast AND ovarian cancer No   Any woman with breast cancer before 50yrs No   2 or more relatives with breast AND/OR ovarian cancer No   2 or more relatives with breast AND/OR bowel cancer No           Mammogram Screening - Mammogram every 1-2 years updated in Health Maintenance based on mutual decision making      History of abnormal Pap smear: Status post hysterectomy with removal of cervix and no history of CIN2 or greater or cervical cancer. Health Maintenance and Surgical History updated.       ASCVD Risk   The ASCVD Risk score (Denise MILTON, et al., 2019) failed to calculate for the following reasons:    The valid total cholesterol range is 130 to 320 mg/dL        Reviewed and updated as needed this visit by Provider                    Current providers  sharing in care for this patient include:  Patient Care Team:  Clara Ambriz PA-C as PCP - General (Family Medicine)  Clara Ambriz PA-C as Assigned PCP  Damaris Chavarria RD as Diabetes Educator (Dietitian, Registered)  Bernarda Awan MD as MD (Cardiovascular Disease)  Jordana Flores, RN as Registered Nurse  Liliana Gruber MD as MD (Surgery)  Blue Leary RPH as Pharmacist  Blue Leary RPH as Assigned MTM Pharmacist  Munir Loyd OD as MD (Optometry)  Munir Loyd OD as Assigned Surgical Provider    The following health maintenance items are reviewed in Epic and correct as of today:  Health Maintenance   Topic Date Due    MEDICARE ANNUAL WELLNESS VISIT  09/21/2010    ZOSTER IMMUNIZATION (1 of 2) Never done    Pneumococcal Vaccine: Pediatrics (0 to 5 Years) and At-Risk Patients (6 to 64 Years) (2 of 2 - PCV) 06/18/2013    RSV VACCINE (1 - Risk 60-74 years 1-dose series) Never done    LUNG CANCER SCREENING  09/23/2023    INFLUENZA VACCINE (1) Never done    COVID-19 Vaccine (2 - 2024-25 season) 09/01/2024    ALT  11/01/2024    LIPID  11/01/2024    MICROALBUMIN  12/19/2024    DIABETIC FOOT EXAM  12/19/2024    ANNUAL REVIEW OF HM ORDERS  12/19/2024    ADVANCE CARE PLANNING  01/28/2025    BMP  02/13/2025    CBC  02/13/2025    EYE EXAM  03/08/2025    A1C  03/18/2025    DTAP/TDAP/TD IMMUNIZATION (2 - Td or Tdap) 03/30/2025    PHQ-9  06/18/2025    MAMMO SCREENING  08/05/2026    HF ACTION PLAN  12/19/2026    COLORECTAL CANCER SCREENING  01/09/2027    TSH W/FREE T4 REFLEX  Completed    HEPATITIS C SCREENING  Completed    HIV SCREENING  Completed    DEPRESSION ACTION PLAN  Completed    HPV IMMUNIZATION  Aged Out    MENINGITIS IMMUNIZATION  Aged Out    RSV MONOCLONAL ANTIBODY  Aged Out    PAP  Discontinued         Review of Systems  Constitutional, neuro, ENT, endocrine, pulmonary, cardiac, gastrointestinal, genitourinary, musculoskeletal, integument and psychiatric systems are  "negative, except as otherwise noted.     Objective    Exam  /64   Pulse 72   Temp 97.9  F (36.6  C) (Temporal)   Resp 20   Ht 1.626 m (5' 4.02\")   Wt 104.3 kg (230 lb)   LMP  (LMP Unknown)   SpO2 92%   BMI 39.46 kg/m     Estimated body mass index is 39.46 kg/m  as calculated from the following:    Height as of this encounter: 1.626 m (5' 4.02\").    Weight as of this encounter: 104.3 kg (230 lb).    Physical Exam  GENERAL: alert, no distress, and obese  EYES: Eyes grossly normal to inspection  HENT: ear canals and TM's normal, nose and mouth without ulcers or lesions  NECK: no adenopathy, no asymmetry, masses, or scars  RESP: lungs clear to auscultation - no rales, rhonchi or wheezes  CV: regular rates and rhythm, normal S1 S2, no S3 or S4, and no murmur, click or rub  MS: no gross musculoskeletal defects noted, no edema  SKIN: no suspicious lesions or rashes  NEURO: Normal strength and tone, mentation intact and speech normal  PSYCH: mentation appears normal, affect normal/bright  Diabetic foot exam: normal DP and PT pulses, no trophic changes or ulcerative lesions, normal sensory exam, and normal monofilament exam, except for findings noted on diabetic foot graphical image.      Missed #9 on right            12/18/2024   Mini Cog   Mini-Cog Not Completed (choose reason) Language barrier and no  present                 Signed Electronically by: Clara Ambriz PA-C    "

## 2024-12-18 NOTE — PROGRESS NOTES
{PROVIDER CHARTING PREFERENCE:619534}    Atul James is a 64 year old, presenting for the following health issues:  No chief complaint on file.  {(!) Visit Details have not yet been documented.  Please enter Visit Details and then use this list to pull in documentation. (Optional):879828}  HPI     {MA/LPN/RN Pre-Provider Visit Orders- hCG/UA/Strep (Optional):297132}  {SUPERLIST (Optional):573592}  {additonal problems for provider to add (Optional):498913}    {ROS Picklists (Optional):637148}      Objective    LMP  (LMP Unknown)   There is no height or weight on file to calculate BMI.  Physical Exam   {Exam List (Optional):872402}    {Diagnostic Test Results (Optional):114491}        Signed Electronically by: Clara Ambriz PA-C  {Email feedback regarding this note to [email or web address]   :910938}

## 2024-12-20 ENCOUNTER — TELEPHONE (OUTPATIENT)
Dept: FAMILY MEDICINE | Facility: CLINIC | Age: 64
End: 2024-12-20
Payer: MEDICARE

## 2024-12-20 DIAGNOSIS — E11.9 TYPE 2 DIABETES MELLITUS WITHOUT COMPLICATION, WITHOUT LONG-TERM CURRENT USE OF INSULIN (H): ICD-10-CM

## 2024-12-20 RX ORDER — DULAGLUTIDE 4.5 MG/.5ML
4.5 INJECTION, SOLUTION SUBCUTANEOUS WEEKLY
Qty: 6 ML | Refills: 0 | Status: SHIPPED | OUTPATIENT
Start: 2024-12-20

## 2024-12-20 NOTE — TELEPHONE ENCOUNTER
Please call patient with the following info:    A1c is still 8.1%, diabetes is not well controlled. Recommend increasing her Trulicity to 4.5mg weekly, new prescription sent in.   Follow up with me in 3 months for another A1c, schedule now

## 2024-12-20 NOTE — TELEPHONE ENCOUNTER
LVM to go over results.      See below     Sherly Mascorro  Lead    [facility] Abbie Aguilera

## 2024-12-23 NOTE — TELEPHONE ENCOUNTER
Spoke with patient's daughter, with okay from patient to speak about results. Relayed provider's message as written. Patient verbalized understanding and has no further questions at this time.    Assisted with booking appointment for 3/24 at 8 am with PCP as requested.    AZIZA Sidhu RN  [facility]

## 2025-03-18 DIAGNOSIS — F33.1 MAJOR DEPRESSIVE DISORDER, RECURRENT EPISODE, MODERATE (H): ICD-10-CM

## 2025-03-19 RX ORDER — SERTRALINE HYDROCHLORIDE 100 MG/1
200 TABLET, FILM COATED ORAL DAILY
Qty: 60 TABLET | Refills: 0 | Status: SHIPPED | OUTPATIENT
Start: 2025-03-19

## 2025-03-24 ENCOUNTER — OFFICE VISIT (OUTPATIENT)
Dept: FAMILY MEDICINE | Facility: CLINIC | Age: 65
End: 2025-03-24
Payer: MEDICARE

## 2025-03-24 VITALS
OXYGEN SATURATION: 94 % | HEIGHT: 64 IN | SYSTOLIC BLOOD PRESSURE: 114 MMHG | BODY MASS INDEX: 37.01 KG/M2 | TEMPERATURE: 97.2 F | RESPIRATION RATE: 16 BRPM | DIASTOLIC BLOOD PRESSURE: 58 MMHG | WEIGHT: 216.8 LBS | HEART RATE: 74 BPM

## 2025-03-24 DIAGNOSIS — F33.1 MAJOR DEPRESSIVE DISORDER, RECURRENT EPISODE, MODERATE (H): ICD-10-CM

## 2025-03-24 DIAGNOSIS — E11.9 TYPE 2 DIABETES MELLITUS WITHOUT COMPLICATION, WITHOUT LONG-TERM CURRENT USE OF INSULIN (H): Primary | ICD-10-CM

## 2025-03-24 DIAGNOSIS — E66.01 MORBID OBESITY (H): ICD-10-CM

## 2025-03-24 LAB
EST. AVERAGE GLUCOSE BLD GHB EST-MCNC: 180 MG/DL
HBA1C MFR BLD: 7.9 % (ref 0–5.6)

## 2025-03-24 PROCEDURE — 83036 HEMOGLOBIN GLYCOSYLATED A1C: CPT | Performed by: PHYSICIAN ASSISTANT

## 2025-03-24 PROCEDURE — 3074F SYST BP LT 130 MM HG: CPT | Performed by: PHYSICIAN ASSISTANT

## 2025-03-24 PROCEDURE — 3078F DIAST BP <80 MM HG: CPT | Performed by: PHYSICIAN ASSISTANT

## 2025-03-24 PROCEDURE — 36415 COLL VENOUS BLD VENIPUNCTURE: CPT | Performed by: PHYSICIAN ASSISTANT

## 2025-03-24 PROCEDURE — 99214 OFFICE O/P EST MOD 30 MIN: CPT | Performed by: PHYSICIAN ASSISTANT

## 2025-03-24 RX ORDER — INSULIN GLARGINE 100 [IU]/ML
32 INJECTION, SOLUTION SUBCUTANEOUS AT BEDTIME
Qty: 30 ML | Refills: 0 | Status: SHIPPED | OUTPATIENT
Start: 2025-03-24

## 2025-03-24 RX ORDER — DULAGLUTIDE 4.5 MG/.5ML
4.5 INJECTION, SOLUTION SUBCUTANEOUS WEEKLY
Qty: 6 ML | Refills: 2 | Status: SHIPPED | OUTPATIENT
Start: 2025-03-24

## 2025-03-24 NOTE — PROGRESS NOTES
"  Assessment & Plan       ICD-10-CM    1. Type 2 diabetes mellitus without complication, without long-term current use of insulin (H)  E11.9 Hemoglobin A1c     Hemoglobin A1c     insulin glargine (LANTUS SOLOSTAR) 100 UNIT/ML pen     Dulaglutide (TRULICITY) 4.5 MG/0.5ML SOAJ      2. Major depressive disorder, recurrent episode, moderate (H)  F33.1       3. Morbid obesity (H)  E66.01            1) A1c only came down 0.2, down to 7.9%. Will increase her insulin to 32 units daily. Continue Metformin, Glipizide, and Trulicity - no changes. Follow up in 3 months.     2) Follows psychiatry    3) Comorbidity to DM, HTN. States she has been eating better for her DM2      BMI  Estimated body mass index is 37.19 kg/m  as calculated from the following:    Height as of this encounter: 1.626 m (5' 4.02\").    Weight as of this encounter: 98.3 kg (216 lb 12.8 oz).     Return in about 14 weeks (around 6/30/2025) for diabetes follow up, with Clara, in person.      Atul James is a 64 year old, presenting for the following health issues:  Diabetes        3/24/2025     8:00 AM   Additional Questions   Roomed by Ana María Macedo [place] [id number] [employer]     HPI      Diabetes Follow-up    How often are you checking your blood sugar? Three times week  Blood sugar testing frequency justification:  Uncontrolled diabetes  What time of day are you checking your blood sugars (select all that apply)?   afternoon  Have you had any blood sugars above 200?  Yes   Have you had any blood sugars below 70?  Yes   What symptoms do you notice when your blood sugar is low?  Shaky, Weak, Lethargy, Blurred vision, and Confusion  What concerns do you have today about your diabetes? None, Blood sugar is often over 200, and Low blood sugar   Do you have any of these symptoms? (Select all that apply)  Numbness in feet, Burning in feet, Excessive thirst, Blurry vision, Weight loss, and Weight gain  Have you had a diabetic eye " "exam in the last 12 months? Yes 2024 [facility]    BP Readings from Last 2 Encounters:   03/24/25 114/58   12/18/24 110/64     Hemoglobin A1C (%)   Date Value   12/18/2024 8.1 (H)   09/18/2024 8.1 (H)   06/14/2021 8.1 (H)   02/16/2021 8.5 (H)     LDL Cholesterol Calculated (mg/dL)   Date Value   12/18/2024 65   11/01/2023 60   02/16/2021 110 (H)   01/28/2020 83         How many servings of fruits and vegetables do you eat daily?  0-1  On average, how many sweetened beverages do you drink each day (Examples: soda, juice, sweet tea, etc.  Do NOT count diet or artificially sweetened beverages)?   0  How many days per week do you exercise enough to make your heart beat faster? 3 or less  How many minutes a day do you exercise enough to make your heart beat faster? 9 or less  How many days per week do you miss taking your medication? 1  What makes it hard for you to take your medications?  remembering to take        Review of Systems  Constitutional, endocrine systems are negative, except as otherwise noted.      Objective    /58   Pulse 74   Temp 97.2  F (36.2  C) (Temporal)   Resp 16   Ht 1.626 m (5' 4.02\")   Wt 98.3 kg (216 lb 12.8 oz)   LMP  (LMP Unknown)   SpO2 94%   BMI 37.19 kg/m    Body mass index is 37.19 kg/m .  Physical Exam   GENERAL: alert and no distress  MS: no gross musculoskeletal defects noted, no edema  SKIN: no suspicious lesions or rashes  NEURO: Normal strength and tone, mentation intact and speech normal  PSYCH: mentation appears normal, affect normal/bright    Results for orders placed or performed in visit on 03/24/25 (from the past 24 hours)   Hemoglobin A1c   Result Value Ref Range    Estimated Average Glucose 180 (H) <117 mg/dL    Hemoglobin A1C 7.9 (H) 0.0 - 5.6 %           Signed Electronically by: Clara Ambriz PA-C    "

## 2025-06-21 DIAGNOSIS — F33.1 MAJOR DEPRESSIVE DISORDER, RECURRENT EPISODE, MODERATE (H): ICD-10-CM

## 2025-06-22 DIAGNOSIS — E11.9 TYPE 2 DIABETES MELLITUS WITHOUT COMPLICATION, WITHOUT LONG-TERM CURRENT USE OF INSULIN (H): ICD-10-CM

## 2025-06-23 RX ORDER — SERTRALINE HYDROCHLORIDE 100 MG/1
200 TABLET, FILM COATED ORAL DAILY
Qty: 120 TABLET | Refills: 0 | Status: SHIPPED | OUTPATIENT
Start: 2025-06-23

## 2025-06-23 RX ORDER — INSULIN GLARGINE 100 [IU]/ML
INJECTION, SOLUTION SUBCUTANEOUS
Qty: 30 ML | Refills: 0 | Status: SHIPPED | OUTPATIENT
Start: 2025-06-23

## 2025-06-24 DIAGNOSIS — I50.9 CHRONIC CONGESTIVE HEART FAILURE, UNSPECIFIED HEART FAILURE TYPE (H): ICD-10-CM

## 2025-06-24 DIAGNOSIS — I10 BENIGN ESSENTIAL HYPERTENSION WITH TARGET BLOOD PRESSURE BELOW 140/90: ICD-10-CM

## 2025-06-24 DIAGNOSIS — R60.0 BILATERAL LEG EDEMA: ICD-10-CM

## 2025-06-24 RX ORDER — AMLODIPINE BESYLATE 10 MG/1
10 TABLET ORAL DAILY
Qty: 30 TABLET | Refills: 0 | Status: SHIPPED | OUTPATIENT
Start: 2025-06-24

## 2025-06-24 RX ORDER — TORSEMIDE 20 MG/1
20 TABLET ORAL DAILY
Qty: 30 TABLET | Refills: 0 | Status: SHIPPED | OUTPATIENT
Start: 2025-06-24

## 2025-06-30 ENCOUNTER — RESULTS FOLLOW-UP (OUTPATIENT)
Dept: FAMILY MEDICINE | Facility: CLINIC | Age: 65
End: 2025-06-30

## 2025-06-30 ENCOUNTER — OFFICE VISIT (OUTPATIENT)
Dept: FAMILY MEDICINE | Facility: CLINIC | Age: 65
End: 2025-06-30
Payer: MEDICARE

## 2025-06-30 VITALS
DIASTOLIC BLOOD PRESSURE: 64 MMHG | WEIGHT: 230 LBS | OXYGEN SATURATION: 96 % | HEIGHT: 64 IN | TEMPERATURE: 97.6 F | BODY MASS INDEX: 39.27 KG/M2 | RESPIRATION RATE: 20 BRPM | SYSTOLIC BLOOD PRESSURE: 124 MMHG | HEART RATE: 78 BPM

## 2025-06-30 DIAGNOSIS — G47.00 INSOMNIA, UNSPECIFIED TYPE: ICD-10-CM

## 2025-06-30 DIAGNOSIS — M54.50 CHRONIC BILATERAL LOW BACK PAIN WITHOUT SCIATICA: ICD-10-CM

## 2025-06-30 DIAGNOSIS — G89.29 CHRONIC BILATERAL LOW BACK PAIN WITHOUT SCIATICA: ICD-10-CM

## 2025-06-30 DIAGNOSIS — I10 BENIGN ESSENTIAL HYPERTENSION WITH TARGET BLOOD PRESSURE BELOW 140/90: ICD-10-CM

## 2025-06-30 DIAGNOSIS — E78.5 HYPERLIPIDEMIA LDL GOAL <100: ICD-10-CM

## 2025-06-30 DIAGNOSIS — E11.9 TYPE 2 DIABETES MELLITUS WITHOUT COMPLICATION, WITHOUT LONG-TERM CURRENT USE OF INSULIN (H): Primary | ICD-10-CM

## 2025-06-30 DIAGNOSIS — F33.1 MAJOR DEPRESSIVE DISORDER, RECURRENT EPISODE, MODERATE (H): ICD-10-CM

## 2025-06-30 LAB
ERYTHROCYTE [DISTWIDTH] IN BLOOD BY AUTOMATED COUNT: 12.8 % (ref 10–15)
EST. AVERAGE GLUCOSE BLD GHB EST-MCNC: 174 MG/DL
HBA1C MFR BLD: 7.7 % (ref 0–5.6)
HCT VFR BLD AUTO: 40.9 % (ref 35–47)
HGB BLD-MCNC: 13.9 G/DL (ref 11.7–15.7)
MCH RBC QN AUTO: 29.2 PG (ref 26.5–33)
MCHC RBC AUTO-ENTMCNC: 34 G/DL (ref 31.5–36.5)
MCV RBC AUTO: 86 FL (ref 78–100)
PLATELET # BLD AUTO: 375 10E3/UL (ref 150–450)
RBC # BLD AUTO: 4.76 10E6/UL (ref 3.8–5.2)
WBC # BLD AUTO: 11.5 10E3/UL (ref 4–11)

## 2025-06-30 PROCEDURE — 3078F DIAST BP <80 MM HG: CPT | Performed by: PHYSICIAN ASSISTANT

## 2025-06-30 PROCEDURE — 85027 COMPLETE CBC AUTOMATED: CPT | Performed by: PHYSICIAN ASSISTANT

## 2025-06-30 PROCEDURE — 36415 COLL VENOUS BLD VENIPUNCTURE: CPT | Performed by: PHYSICIAN ASSISTANT

## 2025-06-30 PROCEDURE — 3051F HG A1C>EQUAL 7.0%<8.0%: CPT | Performed by: PHYSICIAN ASSISTANT

## 2025-06-30 PROCEDURE — 99213 OFFICE O/P EST LOW 20 MIN: CPT | Performed by: PHYSICIAN ASSISTANT

## 2025-06-30 PROCEDURE — 3074F SYST BP LT 130 MM HG: CPT | Performed by: PHYSICIAN ASSISTANT

## 2025-06-30 PROCEDURE — 83036 HEMOGLOBIN GLYCOSYLATED A1C: CPT | Performed by: PHYSICIAN ASSISTANT

## 2025-06-30 RX ORDER — DULAGLUTIDE 4.5 MG/.5ML
4.5 INJECTION, SOLUTION SUBCUTANEOUS WEEKLY
Qty: 6 ML | Refills: 2 | Status: CANCELLED | OUTPATIENT
Start: 2025-06-30

## 2025-06-30 RX ORDER — HYDROCHLOROTHIAZIDE 50 MG/1
50 TABLET ORAL DAILY
Qty: 90 TABLET | Refills: 3 | Status: CANCELLED | OUTPATIENT
Start: 2025-06-30

## 2025-06-30 RX ORDER — ATORVASTATIN CALCIUM 20 MG/1
20 TABLET, FILM COATED ORAL DAILY
Qty: 90 TABLET | Refills: 3 | Status: CANCELLED | OUTPATIENT
Start: 2025-06-30

## 2025-06-30 RX ORDER — HYDROXYZINE HYDROCHLORIDE 50 MG/1
50-100 TABLET, FILM COATED ORAL
Qty: 90 TABLET | Refills: 3 | Status: SHIPPED | OUTPATIENT
Start: 2025-06-30

## 2025-06-30 RX ORDER — METOPROLOL SUCCINATE 100 MG/1
100 TABLET, EXTENDED RELEASE ORAL DAILY
Qty: 90 TABLET | Refills: 3 | Status: CANCELLED | OUTPATIENT
Start: 2025-06-30

## 2025-06-30 RX ORDER — SERTRALINE HYDROCHLORIDE 100 MG/1
200 TABLET, FILM COATED ORAL DAILY
Qty: 180 TABLET | Refills: 3 | Status: SHIPPED | OUTPATIENT
Start: 2025-06-30

## 2025-06-30 RX ORDER — GLIPIZIDE 10 MG/1
20 TABLET, FILM COATED, EXTENDED RELEASE ORAL DAILY
Qty: 180 TABLET | Refills: 3 | Status: SHIPPED | OUTPATIENT
Start: 2025-06-30

## 2025-06-30 RX ORDER — INSULIN GLARGINE 100 [IU]/ML
36 INJECTION, SOLUTION SUBCUTANEOUS AT BEDTIME
Qty: 30 ML | Refills: 0 | Status: SHIPPED | OUTPATIENT
Start: 2025-06-30

## 2025-06-30 RX ORDER — LISINOPRIL 40 MG/1
40 TABLET ORAL DAILY
Qty: 90 TABLET | Refills: 3 | Status: SHIPPED | OUTPATIENT
Start: 2025-06-30

## 2025-06-30 RX ORDER — DULAGLUTIDE 4.5 MG/.5ML
4.5 INJECTION, SOLUTION SUBCUTANEOUS WEEKLY
Qty: 6 ML | Refills: 3 | Status: SHIPPED | OUTPATIENT
Start: 2025-06-30

## 2025-06-30 RX ORDER — MELOXICAM 7.5 MG/1
7.5 TABLET ORAL DAILY
Qty: 90 TABLET | Refills: 3 | Status: CANCELLED | OUTPATIENT
Start: 2025-06-30

## 2025-06-30 RX ORDER — METFORMIN HYDROCHLORIDE 500 MG/1
1000 TABLET, EXTENDED RELEASE ORAL 2 TIMES DAILY WITH MEALS
Qty: 360 TABLET | Refills: 3 | Status: SHIPPED | OUTPATIENT
Start: 2025-06-30

## 2025-06-30 ASSESSMENT — PATIENT HEALTH QUESTIONNAIRE - PHQ9: SUM OF ALL RESPONSES TO PHQ QUESTIONS 1-9: 0

## 2025-06-30 NOTE — PROGRESS NOTES
"  Assessment & Plan       ICD-10-CM    1. Type 2 diabetes mellitus without complication, without long-term current use of insulin (H)  E11.9 **Hemoglobin A1c FUTURE 3mo     CBC with platelets     CBC with platelets     Adult Eye  Referral     glipiZIDE (GLUCOTROL XL) 10 MG 24 hr tablet     insulin pen needle (32G X 4 MM) 32G X 4 MM miscellaneous     metFORMIN (GLUCOPHAGE XR) 500 MG 24 hr tablet     insulin glargine (LANTUS SOLOSTAR) 100 UNIT/ML pen     **Hemoglobin A1c FUTURE 3mo     Dulaglutide (TRULICITY) 4.5 MG/0.5ML SOAJ      2. Hyperlipidemia LDL goal <100  E78.5       3. Benign essential hypertension with target blood pressure below 140/90  I10 lisinopril (ZESTRIL) 40 MG tablet      4. Chronic bilateral low back pain without sciatica  M54.50     G89.29       5. Major depressive disorder, recurrent episode, moderate (H)  F33.1 sertraline (ZOLOFT) 100 MG tablet      6. Insomnia, unspecified type  G47.00 hydrOXYzine HCl (ATARAX) 50 MG tablet          1) Increase insulin to 36 units daily. Follow up in 3 months for another diabetes check and repeat A1c.     2-5) Meds needing refills, not otherwise addressed.     6) Will trial hydroxyzine. I told her she needs to stop napping during the day also.        BMI  Estimated body mass index is 39.46 kg/m  as calculated from the following:    Height as of this encounter: 1.626 m (5' 4.02\").    Weight as of this encounter: 104.3 kg (230 lb).     Return in about 3 months (around 9/30/2025) for diabetes follow up, a repeat A1c, with Clara, in person.      Atul James is a 64 year old, presenting for the following health issues:  Diabetes      6/30/2025     7:51 AM   Additional Questions   Roomed by Marta   Accompanied by radha         6/30/2025     7:51 AM   Patient Reported Additional Medications   Patient reports taking the following new medications none     History of Present Illness       Mental Health Follow-up:  Patient presents to follow-up on Depression " "& Anxiety.Patient's depression since last visit has been:  Good  The patient is not having other symptoms associated with depression.  Patient's anxiety since last visit has been:  Good  The patient is not having other symptoms associated with anxiety.  Any significant life events: No  Patient is not feeling anxious or having panic attacks.  Patient has no concerns about alcohol or drug use.    Diabetes:   She presents for follow up of diabetes.  She is checking home blood glucose a few times a month.   She checks blood glucose before and after meals.  Blood glucose is sometimes over 200 and never under 70.  When her blood glucose is low, the patient is asymptomatic for confusion, blurred vision, lethargy and reports not feeling dizzy, shaky, or weak.   She has no concerns regarding her diabetes at this time.  She is having numbness in feet and burning in feet (swollen feet).  The patient has not had a diabetic eye exam in the last 12 months.          Hyperlipidemia:  She presents for follow up of hyperlipidemia.   She is taking medication to lower cholesterol. She is not having myalgia or other side effects to statin medications.    Hypertension: She presents for follow up of hypertension.  She does check blood pressure  regularly outside of the clinic. Outside blood pressures have been over 140/90. She follows a low salt diet.          Doesn't sleep very well  Sometimes doesn't fall asleep at all          Review of Systems  Constitutional, endocrine systems are negative, except as otherwise noted.      Objective    /64   Pulse 78   Temp 97.6  F (36.4  C) (Temporal)   Resp 20   Ht 1.626 m (5' 4.02\")   Wt 104.3 kg (230 lb)   LMP  (LMP Unknown)   SpO2 96%   BMI 39.46 kg/m    Body mass index is 39.46 kg/m .  Physical Exam   GENERAL: alert and no distress  MS: no gross musculoskeletal defects noted, no edema  SKIN: no suspicious lesions or rashes  NEURO: Normal strength and tone, mentation intact and " speech normal  PSYCH: mentation appears normal, affect normal/bright    Recent Results (from the past 24 hours)   **Hemoglobin A1c FUTURE 3mo   Result Value Ref Range    Estimated Average Glucose 174 (H) <117 mg/dL    Hemoglobin A1C 7.7 (H) 0.0 - 5.6 %   CBC with platelets   Result Value Ref Range    WBC Count 11.5 (H) 4.0 - 11.0 10e3/uL    RBC Count 4.76 3.80 - 5.20 10e6/uL    Hemoglobin 13.9 11.7 - 15.7 g/dL    Hematocrit 40.9 35.0 - 47.0 %    MCV 86 78 - 100 fL    MCH 29.2 26.5 - 33.0 pg    MCHC 34.0 31.5 - 36.5 g/dL    RDW 12.8 10.0 - 15.0 %    Platelet Count 375 150 - 450 10e3/uL           Signed Electronically by: Clara Ambriz PA-C

## 2025-07-01 ENCOUNTER — PATIENT OUTREACH (OUTPATIENT)
Dept: CARE COORDINATION | Facility: CLINIC | Age: 65
End: 2025-07-01
Payer: MEDICARE

## 2025-07-03 ENCOUNTER — PATIENT OUTREACH (OUTPATIENT)
Dept: CARE COORDINATION | Facility: CLINIC | Age: 65
End: 2025-07-03
Payer: MEDICARE

## 2025-08-05 DIAGNOSIS — R60.0 BILATERAL LEG EDEMA: ICD-10-CM

## 2025-08-05 DIAGNOSIS — I10 BENIGN ESSENTIAL HYPERTENSION WITH TARGET BLOOD PRESSURE BELOW 140/90: ICD-10-CM

## 2025-08-05 DIAGNOSIS — I50.9 CHRONIC CONGESTIVE HEART FAILURE, UNSPECIFIED HEART FAILURE TYPE (H): ICD-10-CM

## 2025-08-05 RX ORDER — AMLODIPINE BESYLATE 10 MG/1
10 TABLET ORAL DAILY
Qty: 90 TABLET | Refills: 0 | Status: SHIPPED | OUTPATIENT
Start: 2025-08-05

## 2025-08-05 RX ORDER — TORSEMIDE 20 MG/1
20 TABLET ORAL DAILY
Qty: 60 TABLET | Refills: 0 | Status: SHIPPED | OUTPATIENT
Start: 2025-08-05

## (undated) DEVICE — PAD CHUX UNDERPAD 23X24" 7136

## (undated) DEVICE — KIT ENDO FIRST STEP DISINFECTANT 200ML W/POUCH EP-4

## (undated) DEVICE — PREP CHLORAPREP 26ML TINTED ORANGE  260815

## (undated) RX ORDER — FENTANYL CITRATE 50 UG/ML
INJECTION, SOLUTION INTRAMUSCULAR; INTRAVENOUS
Status: DISPENSED
Start: 2024-01-09

## (undated) RX ORDER — FENTANYL CITRATE 50 UG/ML
INJECTION, SOLUTION INTRAMUSCULAR; INTRAVENOUS
Status: DISPENSED
Start: 2024-01-08